# Patient Record
Sex: MALE | Race: WHITE | Employment: OTHER | URBAN - METROPOLITAN AREA
[De-identification: names, ages, dates, MRNs, and addresses within clinical notes are randomized per-mention and may not be internally consistent; named-entity substitution may affect disease eponyms.]

---

## 2018-08-24 LAB
CHOLESTEROL, TOTAL: 223 MG/DL
CHOLESTEROL/HDL RATIO: ABNORMAL
HDLC SERPL-MCNC: 74 MG/DL (ref 35–70)
LDL CHOLESTEROL CALCULATED: 134 MG/DL (ref 0–160)
TRIGL SERPL-MCNC: 76 MG/DL
VLDLC SERPL CALC-MCNC: 15 MG/DL

## 2019-08-01 DIAGNOSIS — Z98.890 S/P DISCECTOMY FOR HERNIATED NUCLEUS PULPOSUS: ICD-10-CM

## 2019-08-01 DIAGNOSIS — Z87.39 S/P DISCECTOMY FOR HERNIATED NUCLEUS PULPOSUS: ICD-10-CM

## 2019-08-01 DIAGNOSIS — Q45.3 PANCREATIC ABNORMALITY: ICD-10-CM

## 2019-08-14 ENCOUNTER — OFFICE VISIT (OUTPATIENT)
Dept: FAMILY MEDICINE CLINIC | Age: 69
End: 2019-08-14
Payer: COMMERCIAL

## 2019-08-14 VITALS
DIASTOLIC BLOOD PRESSURE: 92 MMHG | BODY MASS INDEX: 35.43 KG/M2 | WEIGHT: 233.8 LBS | HEIGHT: 68 IN | OXYGEN SATURATION: 98 % | SYSTOLIC BLOOD PRESSURE: 146 MMHG | HEART RATE: 80 BPM

## 2019-08-14 DIAGNOSIS — N40.0 BENIGN PROSTATIC HYPERPLASIA, UNSPECIFIED WHETHER LOWER URINARY TRACT SYMPTOMS PRESENT: ICD-10-CM

## 2019-08-14 DIAGNOSIS — Z00.00 WELLNESS EXAMINATION: ICD-10-CM

## 2019-08-14 DIAGNOSIS — M79.671 PAIN IN RIGHT FOOT: ICD-10-CM

## 2019-08-14 DIAGNOSIS — K21.9 GASTROESOPHAGEAL REFLUX DISEASE, ESOPHAGITIS PRESENCE NOT SPECIFIED: ICD-10-CM

## 2019-08-14 DIAGNOSIS — Z23 NEED FOR PROPHYLACTIC VACCINATION AND INOCULATION AGAINST VARICELLA: Primary | ICD-10-CM

## 2019-08-14 DIAGNOSIS — J30.9 ALLERGIC RHINITIS, UNSPECIFIED SEASONALITY, UNSPECIFIED TRIGGER: ICD-10-CM

## 2019-08-14 PROCEDURE — 99397 PER PM REEVAL EST PAT 65+ YR: CPT | Performed by: FAMILY MEDICINE

## 2019-08-14 RX ORDER — OMEPRAZOLE 20 MG/1
20 CAPSULE, DELAYED RELEASE ORAL DAILY
Qty: 90 CAPSULE | Refills: 1 | Status: SHIPPED | OUTPATIENT
Start: 2019-08-14 | End: 2019-09-30

## 2019-08-14 RX ORDER — MONTELUKAST SODIUM 10 MG/1
10 TABLET ORAL DAILY PRN
COMMUNITY
End: 2019-08-14 | Stop reason: SDUPTHER

## 2019-08-14 RX ORDER — MONTELUKAST SODIUM 10 MG/1
10 TABLET ORAL NIGHTLY
Qty: 90 TABLET | Refills: 1 | Status: SHIPPED | OUTPATIENT
Start: 2019-08-14 | End: 2020-04-02 | Stop reason: ALTCHOICE

## 2019-08-14 ASSESSMENT — ENCOUNTER SYMPTOMS
SHORTNESS OF BREATH: 0
VOMITING: 0
ABDOMINAL PAIN: 0
DIARRHEA: 0
COUGH: 0
NAUSEA: 0

## 2019-08-14 ASSESSMENT — PATIENT HEALTH QUESTIONNAIRE - PHQ9
2. FEELING DOWN, DEPRESSED OR HOPELESS: 0
SUM OF ALL RESPONSES TO PHQ9 QUESTIONS 1 & 2: 0
1. LITTLE INTEREST OR PLEASURE IN DOING THINGS: 0
SUM OF ALL RESPONSES TO PHQ QUESTIONS 1-9: 0
SUM OF ALL RESPONSES TO PHQ QUESTIONS 1-9: 0

## 2019-08-14 NOTE — PROGRESS NOTES
medications as prescribed and refills will be provided as necessary.  - omeprazole (PRILOSEC) 20 MG delayed release capsule; Take 1 capsule by mouth Daily  Dispense: 90 capsule; Refill: 1    4. Allergic rhinitis, unspecified seasonality, unspecified trigger  Continue taking medications as prescribed and refills will be provided as necessary.  - montelukast (SINGULAIR) 10 MG tablet; Take 1 tablet by mouth nightly  Dispense: 90 tablet; Refill: 1    5. Pain in right foot  Patient will have lab work completed in the next several days and medication dosages may be changed based on the lab results. Patient will be updated on lab results once they are completed and notified of any medication changes if necessary.  - Uric Acid; Future    6. Wellness examination  Patient will have lab work completed in the next several days and medication dosages may be changed based on the lab results. Patient will be updated on lab results once they are completed and notified of any medication changes if necessary.  - Comprehensive Metabolic Panel; Future  - CBC Auto Differential; Future  - Lipid Panel; Future    Patient will return early next week for his fasting lab work. Is otherwise to follow-up in 6 months. Is to call in approximately 7 to 10 days with his blood pressures as his BP was elevated here in the office, but if we can have a decreased blood pressure recorded at home we may not need to start antihypertensive treatment. Patient verbalized understanding of and agreement with current POC for the assessment and plan dictated above. Electronically signed by JERMAINE Callejas on 8/14/2019      Please note that this chart was generated using dragon dictation software. Although every effort was made to ensure the accuracy of this automated transcription, some errors in transcription may have occurred.

## 2019-08-15 ENCOUNTER — TELEPHONE (OUTPATIENT)
Dept: FAMILY MEDICINE CLINIC | Age: 69
End: 2019-08-15

## 2019-08-15 NOTE — TELEPHONE ENCOUNTER
----- Message from [facility] sent at 8/15/2019  8:25 AM EDT -----  Needs an rx for the shingles vaccine he was supposed to get it when he was here for his joshua but he never got it can we have one ready when he comes in on Monday to do his blood work

## 2019-08-19 ENCOUNTER — TELEPHONE (OUTPATIENT)
Dept: FAMILY MEDICINE CLINIC | Age: 69
End: 2019-08-19

## 2019-08-19 DIAGNOSIS — N40.0 BENIGN PROSTATIC HYPERPLASIA, UNSPECIFIED WHETHER LOWER URINARY TRACT SYMPTOMS PRESENT: ICD-10-CM

## 2019-08-19 DIAGNOSIS — M79.671 PAIN IN RIGHT FOOT: ICD-10-CM

## 2019-08-19 DIAGNOSIS — Z00.00 WELLNESS EXAMINATION: ICD-10-CM

## 2019-08-19 LAB
A/G RATIO: 2 (ref 1.1–2.2)
ALBUMIN SERPL-MCNC: 4.5 G/DL (ref 3.4–5)
ALP BLD-CCNC: 67 U/L (ref 40–129)
ALT SERPL-CCNC: 18 U/L (ref 10–40)
ANION GAP SERPL CALCULATED.3IONS-SCNC: 14 MMOL/L (ref 3–16)
AST SERPL-CCNC: 24 U/L (ref 15–37)
BASOPHILS ABSOLUTE: 0 K/UL (ref 0–0.2)
BASOPHILS RELATIVE PERCENT: 0.7 %
BILIRUB SERPL-MCNC: 0.5 MG/DL (ref 0–1)
BUN BLDV-MCNC: 16 MG/DL (ref 7–20)
CALCIUM SERPL-MCNC: 9.4 MG/DL (ref 8.3–10.6)
CHLORIDE BLD-SCNC: 103 MMOL/L (ref 99–110)
CHOLESTEROL, TOTAL: 195 MG/DL (ref 0–199)
CO2: 26 MMOL/L (ref 21–32)
CREAT SERPL-MCNC: 0.8 MG/DL (ref 0.8–1.3)
EOSINOPHILS ABSOLUTE: 0.1 K/UL (ref 0–0.6)
EOSINOPHILS RELATIVE PERCENT: 1.5 %
GFR AFRICAN AMERICAN: >60
GFR NON-AFRICAN AMERICAN: >60
GLOBULIN: 2.2 G/DL
GLUCOSE BLD-MCNC: 103 MG/DL (ref 70–99)
HCT VFR BLD CALC: 43.7 % (ref 40.5–52.5)
HDLC SERPL-MCNC: 70 MG/DL (ref 40–60)
HEMOGLOBIN: 14.8 G/DL (ref 13.5–17.5)
LDL CHOLESTEROL CALCULATED: 109 MG/DL
LYMPHOCYTES ABSOLUTE: 1.7 K/UL (ref 1–5.1)
LYMPHOCYTES RELATIVE PERCENT: 31.2 %
MCH RBC QN AUTO: 30.6 PG (ref 26–34)
MCHC RBC AUTO-ENTMCNC: 34 G/DL (ref 31–36)
MCV RBC AUTO: 90.1 FL (ref 80–100)
MONOCYTES ABSOLUTE: 0.6 K/UL (ref 0–1.3)
MONOCYTES RELATIVE PERCENT: 10.2 %
NEUTROPHILS ABSOLUTE: 3.1 K/UL (ref 1.7–7.7)
NEUTROPHILS RELATIVE PERCENT: 56.4 %
PDW BLD-RTO: 13.9 % (ref 12.4–15.4)
PLATELET # BLD: 205 K/UL (ref 135–450)
PMV BLD AUTO: 9.4 FL (ref 5–10.5)
POTASSIUM SERPL-SCNC: 4.7 MMOL/L (ref 3.5–5.1)
PROSTATE SPECIFIC ANTIGEN: 1.78 NG/ML (ref 0–4)
RBC # BLD: 4.85 M/UL (ref 4.2–5.9)
SODIUM BLD-SCNC: 143 MMOL/L (ref 136–145)
TOTAL PROTEIN: 6.7 G/DL (ref 6.4–8.2)
TRIGL SERPL-MCNC: 82 MG/DL (ref 0–150)
URIC ACID, SERUM: 5.1 MG/DL (ref 3.5–7.2)
VLDLC SERPL CALC-MCNC: 16 MG/DL
WBC # BLD: 5.5 K/UL (ref 4–11)

## 2019-08-19 RX ORDER — LOSARTAN POTASSIUM 50 MG/1
50 TABLET ORAL DAILY
Qty: 30 TABLET | Refills: 0 | Status: SHIPPED | OUTPATIENT
Start: 2019-08-19 | End: 2019-09-16 | Stop reason: SDUPTHER

## 2019-08-19 NOTE — TELEPHONE ENCOUNTER
SPOKE WITH PT AT 1001AM REGARD. MESSAGE BELOW PER CRUZ DEAN PT VOICED UNDERSTANDING. PT STATES HE USES [facility] FOR SHORT SCRIPTS AND INGENIO RX FOR HIS MAILORDER DID YOU WANT TO CALL IN A 90 DAY SUPPLY AS WELL? PT WANTED TO KNOW HOW LONG HE SHOULD TAKE THIS MEDICATION BEFORE F/U AGAIN. I WILL ROUTE THIS MESSAGE TO VICENTA TO REVIEW AND ADVISE. Electronically signed by Cory De Dios LPN on 9/90/1206 at 50:32 AM    ----- Message from Ismael Diop sent at 8/19/2019  9:03 AM EDT -----  Please let him know that we are starting losartan 50 mg, 1 tablet p.o. daily. Is to check his ambulatory blood pressures and call us in a week with those numbers. Please see what pharmacy he wants the sent to as we only have the mail order pharmacy listed. Also please remind him that should he develop any chest pain, shortness of breath, blurred vision, double vision, pain in either arm neck or jaw, or chest heaviness he is to seek care at the emergency department.

## 2019-08-19 NOTE — TELEPHONE ENCOUNTER
We will send a short prescription into [facility] with 1 refill. We will follow-up in approximately 4 weeks to see if we will keep him on that medication.

## 2019-08-20 ENCOUNTER — TELEPHONE (OUTPATIENT)
Dept: FAMILY MEDICINE CLINIC | Age: 69
End: 2019-08-20

## 2019-08-20 NOTE — TELEPHONE ENCOUNTER
SPOKE WITH PT AT 834AM REGARD. MESSAGE BELOW PER CRUZ DEAN PT VOICED UNDERSTANDING. Electronically signed by Shawn Armenta LPN on 7/95/5849 at 2:85 AM    ----- Message from Jerri Miguelma sent at 8/20/2019  8:27 AM EDT -----  Please let him know that his blood counts, and his PSA level were all normal.  His cholesterol looked okay with his bad cholesterol just slightly elevated. Continue to follow a low-fat, low-cholesterol diet. His fasting blood sugar was slightly elevated at 103, and we like to see that below 100. Continue to monitor diet, decreasing sugar and carbohydrate intake.     Thanks, Steph

## 2019-09-13 ENCOUNTER — OFFICE VISIT (OUTPATIENT)
Dept: FAMILY MEDICINE CLINIC | Age: 69
End: 2019-09-13
Payer: COMMERCIAL

## 2019-09-13 VITALS
HEIGHT: 68 IN | OXYGEN SATURATION: 99 % | TEMPERATURE: 98.4 F | SYSTOLIC BLOOD PRESSURE: 138 MMHG | WEIGHT: 237.8 LBS | DIASTOLIC BLOOD PRESSURE: 88 MMHG | HEART RATE: 88 BPM | BODY MASS INDEX: 36.04 KG/M2

## 2019-09-13 DIAGNOSIS — B96.89 ACUTE BACTERIAL SINUSITIS: Primary | ICD-10-CM

## 2019-09-13 DIAGNOSIS — I10 ESSENTIAL HYPERTENSION: Chronic | ICD-10-CM

## 2019-09-13 DIAGNOSIS — J01.90 ACUTE BACTERIAL SINUSITIS: Primary | ICD-10-CM

## 2019-09-13 PROCEDURE — 99213 OFFICE O/P EST LOW 20 MIN: CPT | Performed by: FAMILY MEDICINE

## 2019-09-13 RX ORDER — AMOXICILLIN 875 MG/1
875 TABLET, COATED ORAL 2 TIMES DAILY
Qty: 20 TABLET | Refills: 0 | Status: SHIPPED | OUTPATIENT
Start: 2019-09-13 | End: 2019-09-23

## 2019-09-13 NOTE — PROGRESS NOTES
Subjective:      Patient ID: Michelle Ferris is a 81 y.o. male.     HPI  She has been having a cough nasal congestion with purulent nasal drainage some sinus pressure no fever  Symptoms and present for 5 days  He has been exposed both any known contagious diseases  He does not have any wheezing  He does not smoke  Is on medication for hypertension and that reason been recently well controlled recently  He is on Singulair regularly for allergic rhinitis and Allegra and Allegra as well  Review of Systems    Objective:   Physical Exam  Eye exam shows no evidence of conjunctivitis  TMs are normal  Nose is clear  Throat shows no exudate  No neck masses or nodes  Lungs show no crackles or wheezes  Cardiac exam is normal  Assessment:      Sinusitis  Hypertension adequately controlled      Plan:      Facility 875 mg twice daily for 10 days  Continue same blood pressure medicine  Follow-up in 6 months        Hannah Gary MD

## 2019-09-16 ENCOUNTER — TELEPHONE (OUTPATIENT)
Dept: FAMILY MEDICINE CLINIC | Age: 69
End: 2019-09-16

## 2019-09-16 RX ORDER — LOSARTAN POTASSIUM 50 MG/1
50 TABLET ORAL DAILY
Qty: 30 TABLET | Refills: 5 | Status: SHIPPED | OUTPATIENT
Start: 2019-09-16 | End: 2020-03-13 | Stop reason: SDUPTHER

## 2019-09-19 ENCOUNTER — TELEPHONE (OUTPATIENT)
Dept: FAMILY MEDICINE CLINIC | Age: 69
End: 2019-09-19

## 2019-09-19 RX ORDER — LEVOFLOXACIN 500 MG/1
500 TABLET, FILM COATED ORAL DAILY
Qty: 10 TABLET | Refills: 0 | Status: SHIPPED | OUTPATIENT
Start: 2019-09-19 | End: 2019-09-29

## 2019-09-20 ENCOUNTER — TELEPHONE (OUTPATIENT)
Dept: FAMILY MEDICINE CLINIC | Age: 69
End: 2019-09-20

## 2019-09-20 NOTE — TELEPHONE ENCOUNTER
[address]/ PT ADVISED NOTE UP FRONT  Electronically signed by Ynes Ang MA on 9/20/2019 at 3:42 PM

## 2019-09-30 ENCOUNTER — HOSPITAL ENCOUNTER (OUTPATIENT)
Age: 69
Discharge: HOME | End: 2019-09-30
Payer: COMMERCIAL

## 2019-09-30 ENCOUNTER — HOSPITAL ENCOUNTER (OUTPATIENT)
Dept: GENERAL RADIOLOGY | Age: 69
Discharge: HOME | End: 2019-09-30
Payer: COMMERCIAL

## 2019-09-30 ENCOUNTER — OFFICE VISIT (OUTPATIENT)
Dept: FAMILY MEDICINE CLINIC | Age: 69
End: 2019-09-30
Payer: COMMERCIAL

## 2019-09-30 VITALS
SYSTOLIC BLOOD PRESSURE: 138 MMHG | WEIGHT: 242.8 LBS | BODY MASS INDEX: 36.8 KG/M2 | HEART RATE: 82 BPM | OXYGEN SATURATION: 98 % | HEIGHT: 68 IN | DIASTOLIC BLOOD PRESSURE: 84 MMHG

## 2019-09-30 DIAGNOSIS — J20.9 ACUTE BRONCHITIS TREATED WITH ANTIBIOTICS IN THE PAST 60 DAYS: ICD-10-CM

## 2019-09-30 DIAGNOSIS — R07.81 RIB PAIN ON RIGHT SIDE: ICD-10-CM

## 2019-09-30 DIAGNOSIS — K21.9 GASTROESOPHAGEAL REFLUX DISEASE, ESOPHAGITIS PRESENCE NOT SPECIFIED: ICD-10-CM

## 2019-09-30 DIAGNOSIS — J01.90 ACUTE SINUSITIS TREATED WITH ANTIBIOTICS IN THE PAST 60 DAYS: ICD-10-CM

## 2019-09-30 DIAGNOSIS — J20.9 ACUTE BRONCHITIS TREATED WITH ANTIBIOTICS IN THE PAST 60 DAYS: Primary | ICD-10-CM

## 2019-09-30 PROCEDURE — 99213 OFFICE O/P EST LOW 20 MIN: CPT | Performed by: PHYSICIAN ASSISTANT

## 2019-09-30 PROCEDURE — 71046 X-RAY EXAM CHEST 2 VIEWS: CPT

## 2019-09-30 RX ORDER — DOXYCYCLINE HYCLATE 100 MG
100 TABLET ORAL 2 TIMES DAILY
Qty: 20 TABLET | Refills: 0 | Status: SHIPPED | OUTPATIENT
Start: 2019-09-30 | End: 2019-10-10

## 2019-09-30 RX ORDER — OMEPRAZOLE 40 MG/1
40 CAPSULE, DELAYED RELEASE ORAL DAILY
Qty: 90 CAPSULE | Refills: 1 | Status: SHIPPED | OUTPATIENT
Start: 2019-09-30 | End: 2020-03-13 | Stop reason: SDUPTHER

## 2019-09-30 RX ORDER — PREDNISONE 20 MG/1
TABLET ORAL
Qty: 20 TABLET | Refills: 0 | Status: SHIPPED | OUTPATIENT
Start: 2019-09-30 | End: 2019-10-12

## 2019-09-30 NOTE — PROGRESS NOTES
sinus congestion and drainage  Cardiovascular:  Denies chest pain, palpitations or swelling.  Admits to right sided rib pain  Respiratory:  Admits to cough, occasional dyspnea  GI:  Denies abdominal pain, nausea, vomiting, or diarrhea  Musculoskeletal:  Denies back pain  Skin:  No rashes  Neurologic:  Denies headache, focal weakness, or sensory changes  Lymphatic:  Denies swollen glands      PAST MEDICAL HISTORY  Past Medical History:   Diagnosis Date    Basal cell carcinoma of skin     RIGHT ANTERIOR SHOULDER    Benign prostatic hyperplasia     Chronic sinusitis     GERD (gastroesophageal reflux disease)     H/O seasonal allergies     Low back pain     Pancreatic abnormality     INCREASED PANCREATIC LIPASE    S/P discectomy for herniated nucleus pulposus     C6-C7       FAMILY HISTORY  Family History   Problem Relation Age of Onset    Cancer Other         FEMALE FAMILY MEMBER - UTERINE       SOCIAL HISTORY  Social History     Socioeconomic History    Marital status:      Spouse name: Not on file    Number of children: Not on file    Years of education: Not on file    Highest education level: Not on file   Occupational History    Not on file   Social Needs    Financial resource strain: Not on file    Food insecurity:     Worry: Not on file     Inability: Not on file    Transportation needs:     Medical: Not on file     Non-medical: Not on file   Tobacco Use    Smoking status: Former Smoker     Packs/day: 1.00     Years: 13.00     Pack years: 13.00     Types: Cigarettes     Start date: 1972     Last attempt to quit: 1985     Years since quittin.1    Smokeless tobacco: Never Used   Substance and Sexual Activity    Alcohol use: No     Comment: two mixed drinks 5 days a week    Drug use: No    Sexual activity: Yes     Partners: Female   Lifestyle    Physical activity:     Days per week: Not on file     Minutes per session: Not on file    Stress: Not on file   Relationships  Social connections:     Talks on phone: Not on file     Gets together: Not on file     Attends Adventist service: Not on file     Active member of club or organization: Not on file     Attends meetings of clubs or organizations: Not on file     Relationship status: Not on file    Intimate partner violence:     Fear of current or ex partner: Not on file     Emotionally abused: Not on file     Physically abused: Not on file     Forced sexual activity: Not on file   Other Topics Concern    Not on file   Social History Narrative    Not on file        SURGICAL HISTORY  Past Surgical History:   Procedure Laterality Date    APPENDECTOMY      COLONOSCOPY  4/10/15    diverticulosis, colon polyps, internal hemorrhoids    KNEE SURGERY Bilateral     Scopes of both knees.  TONGUE SURGERY      Exploratory of lump under tongue. Normal tissue.  VASECTOMY  05/11/1995       CURRENT MEDICATIONS  Current Outpatient Medications   Medication Sig Dispense Refill    predniSONE (DELTASONE) 20 MG tablet Take 3 tablets by mouth daily for 3 days, THEN 2 tablets daily for 3 days, THEN 1 tablet daily for 3 days, THEN 0.5 tablets daily for 3 days. 20 tablet 0    doxycycline hyclate (VIBRA-TABS) 100 MG tablet Take 1 tablet by mouth 2 times daily for 10 days 20 tablet 0    losartan (COZAAR) 50 MG tablet Take 1 tablet by mouth daily 30 tablet 5    Sodium Chloride-Xylitol (XLEAR SINUS CARE SPRAY NA) by Nasal route 2 times daily as needed      omeprazole (PRILOSEC) 20 MG delayed release capsule Take 1 capsule by mouth Daily (Patient taking differently: Take 40 mg by mouth Daily ) 90 capsule 1    montelukast (SINGULAIR) 10 MG tablet Take 1 tablet by mouth nightly 90 tablet 1    fexofenadine (ALLEGRA) 180 MG tablet Take 180 mg by mouth as needed. No current facility-administered medications for this visit.         ALLERGIES  Allergies   Allergen Reactions    Erythromycin     Other      Chloraseptic- sores on tongue   

## 2019-10-01 ENCOUNTER — TELEPHONE (OUTPATIENT)
Dept: FAMILY MEDICINE CLINIC | Age: 69
End: 2019-10-01

## 2020-03-16 RX ORDER — OMEPRAZOLE 40 MG/1
40 CAPSULE, DELAYED RELEASE ORAL DAILY
Qty: 90 CAPSULE | Refills: 0 | Status: SHIPPED | OUTPATIENT
Start: 2020-03-16 | End: 2020-04-02 | Stop reason: SDUPTHER

## 2020-03-16 RX ORDER — LOSARTAN POTASSIUM 50 MG/1
50 TABLET ORAL DAILY
Qty: 90 TABLET | Refills: 0 | Status: SHIPPED | OUTPATIENT
Start: 2020-03-16 | End: 2020-04-02 | Stop reason: SDUPTHER

## 2020-04-02 ENCOUNTER — TELEMEDICINE (OUTPATIENT)
Dept: FAMILY MEDICINE CLINIC | Age: 70
End: 2020-04-02
Payer: MEDICARE

## 2020-04-02 PROCEDURE — 4040F PNEUMOC VAC/ADMIN/RCVD: CPT | Performed by: FAMILY MEDICINE

## 2020-04-02 PROCEDURE — 99213 OFFICE O/P EST LOW 20 MIN: CPT | Performed by: FAMILY MEDICINE

## 2020-04-02 PROCEDURE — G8428 CUR MEDS NOT DOCUMENT: HCPCS | Performed by: FAMILY MEDICINE

## 2020-04-02 PROCEDURE — 1123F ACP DISCUSS/DSCN MKR DOCD: CPT | Performed by: FAMILY MEDICINE

## 2020-04-02 PROCEDURE — 3017F COLORECTAL CA SCREEN DOC REV: CPT | Performed by: FAMILY MEDICINE

## 2020-04-02 RX ORDER — LOSARTAN POTASSIUM 50 MG/1
50 TABLET ORAL DAILY
Qty: 90 TABLET | Refills: 0 | Status: SHIPPED | OUTPATIENT
Start: 2020-04-02 | End: 2020-06-12 | Stop reason: SDUPTHER

## 2020-04-02 RX ORDER — OMEPRAZOLE 40 MG/1
40 CAPSULE, DELAYED RELEASE ORAL DAILY
Qty: 90 CAPSULE | Refills: 0 | Status: SHIPPED | OUTPATIENT
Start: 2020-04-02 | End: 2020-06-12 | Stop reason: SDUPTHER

## 2020-04-02 ASSESSMENT — ENCOUNTER SYMPTOMS: RESPIRATORY NEGATIVE: 1

## 2020-04-02 NOTE — PROGRESS NOTES
[facility], [address]2 waiver authority and the [employer] and [employer] Act, this Virtual Visit was conducted with patient's (and/or legal guardian's) consent, to reduce the patient's risk of exposure to COVID-19 and provide necessary medical care. The patient (and/or legal guardian) has also been advised to contact this office for worsening conditions or problems, and seek emergency medical treatment and/or call 911 if deemed necessary. Services were provided through a video synchronous discussion virtually to substitute for in-person clinic visit. Patient and provider were located at their individual homes. --Tristan Gilman MD on 4/2/2020 at 2:39 PM    An electronic signature was used to authenticate this note.

## 2020-06-12 RX ORDER — OMEPRAZOLE 40 MG/1
40 CAPSULE, DELAYED RELEASE ORAL DAILY
Qty: 90 CAPSULE | Refills: 1 | Status: SHIPPED | OUTPATIENT
Start: 2020-06-12 | End: 2020-12-10 | Stop reason: SDUPTHER

## 2020-06-12 RX ORDER — LOSARTAN POTASSIUM 50 MG/1
50 TABLET ORAL DAILY
Qty: 90 TABLET | Refills: 1 | Status: SHIPPED | OUTPATIENT
Start: 2020-06-12 | End: 2020-12-03 | Stop reason: SDUPTHER

## 2020-08-04 ENCOUNTER — TELEPHONE (OUTPATIENT)
Dept: FAMILY MEDICINE CLINIC | Age: 70
End: 2020-08-04

## 2020-09-11 ENCOUNTER — OFFICE VISIT (OUTPATIENT)
Dept: FAMILY MEDICINE CLINIC | Age: 70
End: 2020-09-11
Payer: MEDICARE

## 2020-09-11 VITALS
SYSTOLIC BLOOD PRESSURE: 120 MMHG | HEIGHT: 68 IN | HEART RATE: 90 BPM | DIASTOLIC BLOOD PRESSURE: 86 MMHG | WEIGHT: 239.8 LBS | TEMPERATURE: 97.1 F | BODY MASS INDEX: 36.34 KG/M2 | OXYGEN SATURATION: 98 %

## 2020-09-11 PROBLEM — E66.01 MORBIDLY OBESE (HCC): Status: ACTIVE | Noted: 2020-09-11

## 2020-09-11 PROCEDURE — 4040F PNEUMOC VAC/ADMIN/RCVD: CPT | Performed by: FAMILY MEDICINE

## 2020-09-11 PROCEDURE — 1036F TOBACCO NON-USER: CPT | Performed by: FAMILY MEDICINE

## 2020-09-11 PROCEDURE — 3017F COLORECTAL CA SCREEN DOC REV: CPT | Performed by: FAMILY MEDICINE

## 2020-09-11 PROCEDURE — 99214 OFFICE O/P EST MOD 30 MIN: CPT | Performed by: FAMILY MEDICINE

## 2020-09-11 PROCEDURE — 1123F ACP DISCUSS/DSCN MKR DOCD: CPT | Performed by: FAMILY MEDICINE

## 2020-09-11 PROCEDURE — G8417 CALC BMI ABV UP PARAM F/U: HCPCS | Performed by: FAMILY MEDICINE

## 2020-09-11 PROCEDURE — G0008 ADMIN INFLUENZA VIRUS VAC: HCPCS | Performed by: FAMILY MEDICINE

## 2020-09-11 PROCEDURE — G8427 DOCREV CUR MEDS BY ELIG CLIN: HCPCS | Performed by: FAMILY MEDICINE

## 2020-09-11 PROCEDURE — 90694 VACC AIIV4 NO PRSRV 0.5ML IM: CPT | Performed by: FAMILY MEDICINE

## 2020-09-11 RX ORDER — NAPROXEN 250 MG/1
500 TABLET ORAL 2 TIMES DAILY PRN
COMMUNITY
End: 2021-03-11 | Stop reason: DRUGHIGH

## 2020-09-11 ASSESSMENT — PATIENT HEALTH QUESTIONNAIRE - PHQ9
SUM OF ALL RESPONSES TO PHQ9 QUESTIONS 1 & 2: 0
SUM OF ALL RESPONSES TO PHQ QUESTIONS 1-9: 0
1. LITTLE INTEREST OR PLEASURE IN DOING THINGS: 0
SUM OF ALL RESPONSES TO PHQ QUESTIONS 1-9: 0
2. FEELING DOWN, DEPRESSED OR HOPELESS: 0

## 2020-09-11 ASSESSMENT — ENCOUNTER SYMPTOMS
DIARRHEA: 0
SORE THROAT: 0
SHORTNESS OF BREATH: 0
COUGH: 0
NAUSEA: 0
CONSTIPATION: 0
BACK PAIN: 1
BLOOD IN STOOL: 0
ABDOMINAL PAIN: 1

## 2020-09-11 NOTE — PROGRESS NOTES
little bit of radiation into his mid back   Allergic/Immunologic: Positive for environmental allergies. Psychiatric/Behavioral: Negative.         PAST MEDICAL HISTORY  Past Medical History:   Diagnosis Date    Basal cell carcinoma of skin     RIGHT ANTERIOR SHOULDER    Benign prostatic hyperplasia     Chronic sinusitis     GERD (gastroesophageal reflux disease)     H/O seasonal allergies     Low back pain     Pancreatic abnormality     INCREASED PANCREATIC LIPASE    S/P discectomy for herniated nucleus pulposus     C6-C7       FAMILY HISTORY  Family History   Problem Relation Age of Onset    Cancer Other         FEMALE FAMILY MEMBER - UTERINE       SOCIAL HISTORY  Social History     Socioeconomic History    Marital status:      Spouse name: None    Number of children: None    Years of education: None    Highest education level: None   Occupational History    None   Social Needs    Financial resource strain: None    Food insecurity     Worry: None     Inability: None    Transportation needs     Medical: None     Non-medical: None   Tobacco Use    Smoking status: Former Smoker     Packs/day: 1.00     Years: 13.00     Pack years: 13.00     Types: Cigarettes     Start date: 1972     Last attempt to quit: 1985     Years since quittin.1    Smokeless tobacco: Never Used   Substance and Sexual Activity    Alcohol use: No     Comment: two mixed drinks 5 days a week    Drug use: No    Sexual activity: Yes     Partners: Female   Lifestyle    Physical activity     Days per week: None     Minutes per session: None    Stress: None   Relationships    Social connections     Talks on phone: None     Gets together: None     Attends Mormonism service: None     Active member of club or organization: None     Attends meetings of clubs or organizations: None     Relationship status: None    Intimate partner violence     Fear of current or ex partner: None     Emotionally abused: None Right Ear: External ear normal.      Left Ear: External ear normal.   Eyes:      Conjunctiva/sclera: Conjunctivae normal.   Cardiovascular:      Rate and Rhythm: Normal rate. Pulmonary:      Effort: Pulmonary effort is normal. No respiratory distress. Abdominal:      General: There is no distension. Palpations: There is no mass. Tenderness: There is no abdominal tenderness. There is no right CVA tenderness, guarding or rebound. Hernia: No hernia is present. Skin:     General: Skin is warm and dry. Neurological:      General: No focal deficit present. Mental Status: He is alert. Mental status is at baseline. Psychiatric:         Mood and Affect: Mood normal.         Thought Content: Thought content normal.       Immunizations reviewed  ASSESSMENT and Eden Mendoza was seen today for rib pain. Diagnoses and all orders for this visit:    Essential hypertension  -     Comprehensive Metabolic Panel; Future  -     Lipid Panel; Future    Morbidly obese (HCC)    Right upper quadrant abdominal pain  -     US ABDOMINAL LIMITED; Future    Gastroesophageal reflux disease, esophagitis presence not specified    Other orders  -     Influenza, Quadv, Adjunanted,, 65 yrs+ , IM, PF, Prefill syr, 0.5mL (FLUAD)    Will work-up the right upper quadrant pain without ultrasound and above reference labs  Continue same antihypertensive  Influenza vaccine administered  Follow-up by phone regarding his test results      Return in about 6 months (around 3/11/2021). Electronically signed by Shelly Price MD on 9/11/2020    Please note that this chart was generated using dragon dictation software. Although every effort was made to ensure the accuracy of this automated transcription, some errors in transcription may have occurred.

## 2020-09-17 ENCOUNTER — HOSPITAL ENCOUNTER (OUTPATIENT)
Age: 70
Discharge: HOME | End: 2020-09-17
Payer: MEDICARE

## 2020-09-17 ENCOUNTER — HOSPITAL ENCOUNTER (OUTPATIENT)
Dept: ULTRASOUND IMAGING | Age: 70
Discharge: HOME | End: 2020-09-17
Payer: MEDICARE

## 2020-09-17 LAB
ALBUMIN SERPL-MCNC: 4.4 GM/DL (ref 3.4–5)
ALP BLD-CCNC: 70 IU/L (ref 40–128)
ALT SERPL-CCNC: 21 U/L (ref 10–40)
ANION GAP SERPL CALCULATED.3IONS-SCNC: 7 MMOL/L (ref 4–16)
AST SERPL-CCNC: 18 IU/L (ref 15–37)
BILIRUB SERPL-MCNC: 0.3 MG/DL (ref 0–1)
BUN BLDV-MCNC: 19 MG/DL (ref 6–23)
CALCIUM SERPL-MCNC: 8.9 MG/DL (ref 8.3–10.6)
CHLORIDE BLD-SCNC: 102 MMOL/L (ref 99–110)
CHOLESTEROL: 200 MG/DL
CO2: 29 MMOL/L (ref 21–32)
CREAT SERPL-MCNC: 1 MG/DL (ref 0.9–1.3)
GFR AFRICAN AMERICAN: >60 ML/MIN/1.73M2
GFR NON-AFRICAN AMERICAN: >60 ML/MIN/1.73M2
GLUCOSE BLD-MCNC: 112 MG/DL (ref 70–99)
HDLC SERPL-MCNC: 73 MG/DL
LDL CHOLESTEROL DIRECT: 115 MG/DL
POTASSIUM SERPL-SCNC: 5 MMOL/L (ref 3.5–5.1)
SODIUM BLD-SCNC: 138 MMOL/L (ref 135–145)
TOTAL PROTEIN: 6.3 GM/DL (ref 6.4–8.2)
TRIGL SERPL-MCNC: 60 MG/DL

## 2020-09-17 PROCEDURE — 76705 ECHO EXAM OF ABDOMEN: CPT

## 2020-09-17 PROCEDURE — 83721 ASSAY OF BLOOD LIPOPROTEIN: CPT

## 2020-09-17 PROCEDURE — 80061 LIPID PANEL: CPT

## 2020-09-17 PROCEDURE — 36415 COLL VENOUS BLD VENIPUNCTURE: CPT

## 2020-09-17 PROCEDURE — 80053 COMPREHEN METABOLIC PANEL: CPT

## 2020-09-18 NOTE — PROGRESS NOTES
Vaccine Information Sheet, \"Influenza - Inactivated\"  given to Chuyita Huitron, or parent/legal guardian of  Chuyita Huitron and verbalized understanding. Patient responses:    Have you ever had a reaction to a flu vaccine? No  Do you have any current illness? No  Have you ever had Guillian [place] Syndrome? No  Do you have a serious allergy to any of the follow: Neomycin, Polymyxin, Thimerosal, eggs or egg products? No    Flu vaccine given per order. Please see immunization tab. Risks and benefits explained. Current VIS given.       Immunizations Administered     Name Date Dose Route    Influenza, Quadv, adjuvanted, 65 yrs +, IM, PF (Fluad) 9/11/2020 0.5 mL Intramuscular    Site: Deltoid- Left    Lot: [id number]    NDC: 03613-809-36

## 2020-09-21 ENCOUNTER — TELEPHONE (OUTPATIENT)
Dept: FAMILY MEDICINE CLINIC | Age: 70
End: 2020-09-21

## 2020-12-03 RX ORDER — LOSARTAN POTASSIUM 50 MG/1
50 TABLET ORAL DAILY
Qty: 90 TABLET | Refills: 1 | Status: SHIPPED | OUTPATIENT
Start: 2020-12-03 | End: 2021-05-24 | Stop reason: SDUPTHER

## 2020-12-10 ENCOUNTER — TELEMEDICINE (OUTPATIENT)
Dept: FAMILY MEDICINE CLINIC | Age: 70
End: 2020-12-10
Payer: MEDICARE

## 2020-12-10 PROCEDURE — 4040F PNEUMOC VAC/ADMIN/RCVD: CPT | Performed by: PHYSICIAN ASSISTANT

## 2020-12-10 PROCEDURE — 1123F ACP DISCUSS/DSCN MKR DOCD: CPT | Performed by: PHYSICIAN ASSISTANT

## 2020-12-10 PROCEDURE — G8484 FLU IMMUNIZE NO ADMIN: HCPCS | Performed by: PHYSICIAN ASSISTANT

## 2020-12-10 PROCEDURE — G8427 DOCREV CUR MEDS BY ELIG CLIN: HCPCS | Performed by: PHYSICIAN ASSISTANT

## 2020-12-10 PROCEDURE — 99214 OFFICE O/P EST MOD 30 MIN: CPT | Performed by: PHYSICIAN ASSISTANT

## 2020-12-10 PROCEDURE — G8417 CALC BMI ABV UP PARAM F/U: HCPCS | Performed by: PHYSICIAN ASSISTANT

## 2020-12-10 PROCEDURE — 1036F TOBACCO NON-USER: CPT | Performed by: PHYSICIAN ASSISTANT

## 2020-12-10 PROCEDURE — 3017F COLORECTAL CA SCREEN DOC REV: CPT | Performed by: PHYSICIAN ASSISTANT

## 2020-12-10 RX ORDER — OMEPRAZOLE 40 MG/1
40 CAPSULE, DELAYED RELEASE ORAL DAILY
Qty: 90 CAPSULE | Refills: 1 | Status: SHIPPED | OUTPATIENT
Start: 2020-12-10 | End: 2021-05-24 | Stop reason: SDUPTHER

## 2020-12-10 RX ORDER — MULTIVIT WITH MINERALS/LUTEIN
250 TABLET ORAL DAILY
COMMUNITY

## 2020-12-10 ASSESSMENT — ENCOUNTER SYMPTOMS
SHORTNESS OF BREATH: 0
BLOOD IN STOOL: 0
CONSTIPATION: 0
DIARRHEA: 0
COUGH: 0

## 2020-12-10 NOTE — PROGRESS NOTES
12/10/2020    TELEHEALTH EVALUATION -- Audio/Visual ([facility])    HPI:    Jorge Granados (:  1950) has requested an audio/video evaluation for the following concern(s):    HTN - Compliant with Losartan 50 mg daily. Home bp averages around 120/65. GERD - Well controlled on Omeprazole 40 mg daily. Allergies - Well controlled on Allegra daily. RUQ Pain - Describes this as an \"ache,\" which is \"always there,\" but worse after heavy lifting. Denies bulges. Health maintenance - Shingles vaccine, P23     The 10-year ASCVD risk score (Niurka Gonzalez, et al., 2013) is: 13.7%    Values used to calculate the score:      Age: 78 years      Sex: Male      Is Non- : No      Diabetic: No      Tobacco smoker: No      Systolic Blood Pressure: 910 mmHg      Is BP treated: Yes      HDL Cholesterol: 73 MG/DL      Total Cholesterol: 200 MG/DL    Review of Systems   Respiratory: Negative for cough and shortness of breath. Cardiovascular: Negative for chest pain and palpitations. Gastrointestinal: Negative for blood in stool, constipation and diarrhea. Prior to Visit Medications    Medication Sig Taking? Authorizing Provider   Ascorbic Acid (VITAMIN C) 250 MG tablet Take 250 mg by mouth daily Yes Historical Provider, MD   omeprazole (PRILOSEC) 40 MG delayed release capsule Take 1 capsule by mouth daily Yes Riki Garcias PA-C   losartan (COZAAR) 50 MG tablet Take 1 tablet by mouth daily Yes Waqas Steiner MD   Sodium Chloride-Xylitol (XLEAR SINUS CARE SPRAY NA) by Nasal route 2 times daily as needed Yes Historical Provider, MD   fexofenadine (ALLEGRA) 180 MG tablet Take 180 mg by mouth as needed.  Yes Historical Provider, MD   Calcium Carb-Cholecalciferol (CALCIUM-VITAMIN D) 500-200 MG-UNIT per tablet Take 1 tablet by mouth 2 times daily (with meals)  Historical Provider, MD   Multiple Vitamins-Minerals (HAIR SKIN AND NAILS FORMULA PO) Take 1 tablet by mouth daily  Historical Provider, MD   naproxen (NAPROSYN) 250 MG tablet Take 500 mg by mouth 2 times daily as needed for Pain  Historical Provider, MD   GLUCOSAMINE-CHONDROITIN DS PO Take 1 tablet by mouth daily  Historical Provider, MD       Social History     Tobacco Use    Smoking status: Former Smoker     Packs/day: 1.00     Years: 13.00     Pack years: 13.00     Types: Cigarettes     Start date: 1972     Last attempt to quit: 1985     Years since quittin.3    Smokeless tobacco: Never Used   Substance Use Topics    Alcohol use: No     Comment: two mixed drinks 5 days a week    Drug use: No        PHYSICAL EXAMINATION:    Constitutional: Appears well-developed and well-nourished. No apparent distress    Mental status: Alert and awake, Oriented to person/place/time, Able to follow commands    Eyes: EOM normal, sclera normal, no visible discharge. HENT: Normocephalic, atraumatic. Mouth/Throat: Mucous membranes are moist. External Ears Normal   Neck: No visualized mass   Pulmonary/Chest: Respiratory effort normal. No visualized signs of difficulty breathing or respiratory distress   Musculoskeletal: Normal gait with no signs of ataxia. Normal range of motion of neck  Neurological:No Facial Asymmetry (Cranial nerve 7 motor function) (limited exam to video visit). No gaze palsy. Skin: No significant exanthematous lesions or discoloration noted on facial skin  Psychiatric: Normal Affect. No Hallucinations. ASSESSMENT/PLAN:  1. Gastroesophageal reflux disease  Well-controlled. Refilled medication. - omeprazole (PRILOSEC) 40 MG delayed release capsule; Take 1 capsule by mouth daily  Dispense: 90 capsule; Refill: 1    2. Essential hypertension  Well-controlled. - CBC Auto Differential; Future  - Comprehensive Metabolic Panel; Future    3. Right upper quadrant abdominal pain  RUQ US normal. Discomfort is likely muscular. Recommended Naproxen 500 mg BID X 10-14 days then prn.  Also recommended trying heating pad. Offered to prescribe muscle relaxer for the patient to try prn, but patient politely declined this. 4. IFG (impaired fasting glucose)  Last fasting glucose was elevated. Will check A1c and advise based on findings. - Hemoglobin A1C; Future    5. 10 year risk of MI or stroke 7.5% or greater  I explained to the patient that his ASCVD risk score is greater than 7.5%, so we will recheck his lipid panel, and I will rerun the calculation. If it is still greater than 7.5%, we will start a statin medication. Patient notes that he has been on a statin medication before, and he had muscle and joint aches on this. He cannot remember the name of this  - Lipid Panel; Future    6. Mixed hyperlipidemia  See above (#5). - Lipid Panel; Future    7. Need for hepatitis C screening test  Health maintenance requirement.  - Hepatitis C Antibody; Future      Return in about 6 months (around 6/10/2021) for Routine 6 month follow up. Kami Hart is a 78 y.o. male being evaluated by a Virtual Visit (video visit) encounter to address concerns as mentioned above. A caregiver was present when appropriate. Due to this being a TeleHealth encounter (During [facility]-54 public health emergency), evaluation of the following organ systems was limited: Vitals/Constitutional/EENT/Resp/CV/GI//MS/Neuro/Skin/Heme-Lymph-Imm. Pursuant to the emergency declaration under the [facility], [address] [place] [address] and the [employer] and [employer] Act, this Virtual Visit was conducted with patient's (and/or legal guardian's) consent, to reduce the patient's risk of exposure to COVID-19 and provide necessary medical care. The patient (and/or legal guardian) has also been advised to contact this office for worsening conditions or problems, and seek emergency medical treatment and/or call 911 if deemed necessary.      Patient identification was verified at the start of the visit: Yes    Total time spent on this encounter: 10 minutes    Services were provided through a video synchronous discussion virtually to substitute for in-person clinic visit. Patient and provider were located at their individual homes. --Janneth Solano PA-C on 12/10/2020 at 2:50 PM    An electronic signature was used to authenticate this note.

## 2020-12-11 DIAGNOSIS — E78.2 MIXED HYPERLIPIDEMIA: ICD-10-CM

## 2020-12-11 DIAGNOSIS — Z11.59 NEED FOR HEPATITIS C SCREENING TEST: ICD-10-CM

## 2020-12-11 DIAGNOSIS — I10 ESSENTIAL HYPERTENSION: ICD-10-CM

## 2020-12-11 DIAGNOSIS — R73.01 IFG (IMPAIRED FASTING GLUCOSE): ICD-10-CM

## 2020-12-11 DIAGNOSIS — Z91.89 10 YEAR RISK OF MI OR STROKE 7.5% OR GREATER: ICD-10-CM

## 2020-12-11 LAB
A/G RATIO: 2 (ref 1.1–2.2)
ALBUMIN SERPL-MCNC: 4.4 G/DL (ref 3.4–5)
ALP BLD-CCNC: 75 U/L (ref 40–129)
ALT SERPL-CCNC: 18 U/L (ref 10–40)
ANION GAP SERPL CALCULATED.3IONS-SCNC: 13 MMOL/L (ref 3–16)
AST SERPL-CCNC: 21 U/L (ref 15–37)
BASOPHILS ABSOLUTE: 0 K/UL (ref 0–0.2)
BASOPHILS RELATIVE PERCENT: 0.6 %
BILIRUB SERPL-MCNC: 0.3 MG/DL (ref 0–1)
BUN BLDV-MCNC: 19 MG/DL (ref 7–20)
CALCIUM SERPL-MCNC: 8.9 MG/DL (ref 8.3–10.6)
CHLORIDE BLD-SCNC: 104 MMOL/L (ref 99–110)
CHOLESTEROL, TOTAL: 216 MG/DL (ref 0–199)
CO2: 25 MMOL/L (ref 21–32)
CREAT SERPL-MCNC: 0.8 MG/DL (ref 0.8–1.3)
EOSINOPHILS ABSOLUTE: 0.1 K/UL (ref 0–0.6)
EOSINOPHILS RELATIVE PERCENT: 1.8 %
GFR AFRICAN AMERICAN: >60
GFR NON-AFRICAN AMERICAN: >60
GLOBULIN: 2.2 G/DL
GLUCOSE BLD-MCNC: 99 MG/DL (ref 70–99)
HCT VFR BLD CALC: 45.3 % (ref 40.5–52.5)
HDLC SERPL-MCNC: 72 MG/DL (ref 40–60)
HEMOGLOBIN: 15 G/DL (ref 13.5–17.5)
HEPATITIS C ANTIBODY INTERPRETATION: NORMAL
LDL CHOLESTEROL CALCULATED: 130 MG/DL
LYMPHOCYTES ABSOLUTE: 2 K/UL (ref 1–5.1)
LYMPHOCYTES RELATIVE PERCENT: 39.1 %
MCH RBC QN AUTO: 29.6 PG (ref 26–34)
MCHC RBC AUTO-ENTMCNC: 33.2 G/DL (ref 31–36)
MCV RBC AUTO: 89.2 FL (ref 80–100)
MONOCYTES ABSOLUTE: 0.6 K/UL (ref 0–1.3)
MONOCYTES RELATIVE PERCENT: 11.2 %
NEUTROPHILS ABSOLUTE: 2.5 K/UL (ref 1.7–7.7)
NEUTROPHILS RELATIVE PERCENT: 47.3 %
PDW BLD-RTO: 13.6 % (ref 12.4–15.4)
PLATELET # BLD: 228 K/UL (ref 135–450)
PMV BLD AUTO: 8.5 FL (ref 5–10.5)
POTASSIUM SERPL-SCNC: 4.7 MMOL/L (ref 3.5–5.1)
RBC # BLD: 5.08 M/UL (ref 4.2–5.9)
SODIUM BLD-SCNC: 142 MMOL/L (ref 136–145)
TOTAL PROTEIN: 6.6 G/DL (ref 6.4–8.2)
TRIGL SERPL-MCNC: 72 MG/DL (ref 0–150)
VLDLC SERPL CALC-MCNC: 14 MG/DL
WBC # BLD: 5.2 K/UL (ref 4–11)

## 2020-12-12 LAB
ESTIMATED AVERAGE GLUCOSE: 114 MG/DL
HBA1C MFR BLD: 5.6 %

## 2020-12-14 RX ORDER — PRAVASTATIN SODIUM 40 MG
40 TABLET ORAL DAILY
Qty: 30 TABLET | Refills: 0 | Status: SHIPPED | OUTPATIENT
Start: 2020-12-14 | End: 2020-12-16 | Stop reason: SDUPTHER

## 2020-12-16 RX ORDER — PRAVASTATIN SODIUM 40 MG
40 TABLET ORAL DAILY
Qty: 30 TABLET | Refills: 0 | Status: SHIPPED | OUTPATIENT
Start: 2020-12-16 | End: 2021-01-12

## 2020-12-16 NOTE — TELEPHONE ENCOUNTER
Please send the Pravastatin to [facility] on [address]. ----he does not use [facility] anymore.

## 2021-01-12 DIAGNOSIS — E78.2 MIXED HYPERLIPIDEMIA: ICD-10-CM

## 2021-01-12 DIAGNOSIS — Z91.89 10 YEAR RISK OF MI OR STROKE 7.5% OR GREATER: ICD-10-CM

## 2021-01-12 RX ORDER — PRAVASTATIN SODIUM 40 MG
40 TABLET ORAL DAILY
Qty: 30 TABLET | Refills: 2 | Status: SHIPPED | OUTPATIENT
Start: 2021-01-12 | End: 2021-09-27 | Stop reason: SINTOL

## 2021-03-11 ENCOUNTER — TELEMEDICINE (OUTPATIENT)
Dept: FAMILY MEDICINE CLINIC | Age: 71
End: 2021-03-11
Payer: MEDICARE

## 2021-03-11 DIAGNOSIS — I10 ESSENTIAL HYPERTENSION: Chronic | ICD-10-CM

## 2021-03-11 DIAGNOSIS — N52.8 OTHER MALE ERECTILE DYSFUNCTION: Chronic | ICD-10-CM

## 2021-03-11 DIAGNOSIS — M17.0 PRIMARY OSTEOARTHRITIS OF BOTH KNEES: Chronic | ICD-10-CM

## 2021-03-11 DIAGNOSIS — E78.49 OTHER HYPERLIPIDEMIA: Chronic | ICD-10-CM

## 2021-03-11 DIAGNOSIS — N40.0 BENIGN PROSTATIC HYPERPLASIA, UNSPECIFIED WHETHER LOWER URINARY TRACT SYMPTOMS PRESENT: Primary | ICD-10-CM

## 2021-03-11 DIAGNOSIS — J30.1 NON-SEASONAL ALLERGIC RHINITIS DUE TO POLLEN: Chronic | ICD-10-CM

## 2021-03-11 PROCEDURE — G8427 DOCREV CUR MEDS BY ELIG CLIN: HCPCS | Performed by: FAMILY MEDICINE

## 2021-03-11 PROCEDURE — 4040F PNEUMOC VAC/ADMIN/RCVD: CPT | Performed by: FAMILY MEDICINE

## 2021-03-11 PROCEDURE — 99213 OFFICE O/P EST LOW 20 MIN: CPT | Performed by: FAMILY MEDICINE

## 2021-03-11 PROCEDURE — 3017F COLORECTAL CA SCREEN DOC REV: CPT | Performed by: FAMILY MEDICINE

## 2021-03-11 PROCEDURE — 1123F ACP DISCUSS/DSCN MKR DOCD: CPT | Performed by: FAMILY MEDICINE

## 2021-03-11 RX ORDER — NAPROXEN 500 MG/1
500 TABLET ORAL 2 TIMES DAILY PRN
Qty: 180 TABLET | Refills: 1 | Status: SHIPPED | OUTPATIENT
Start: 2021-03-11 | End: 2021-09-27 | Stop reason: SDUPTHER

## 2021-03-11 RX ORDER — SILDENAFIL 100 MG/1
100 TABLET, FILM COATED ORAL DAILY PRN
Qty: 30 TABLET | Refills: 1 | Status: SHIPPED | OUTPATIENT
Start: 2021-03-11 | End: 2021-10-28 | Stop reason: SDUPTHER

## 2021-03-11 RX ORDER — NAPROXEN 500 MG/1
500 TABLET ORAL 2 TIMES DAILY PRN
Qty: 180 TABLET | Refills: 1 | Status: SHIPPED | COMMUNITY
Start: 2021-03-11 | End: 2021-03-11 | Stop reason: SDUPTHER

## 2021-03-11 RX ORDER — SILDENAFIL 100 MG/1
100 TABLET, FILM COATED ORAL DAILY PRN
Qty: 30 TABLET | Refills: 1 | Status: SHIPPED | OUTPATIENT
Start: 2021-03-11 | End: 2021-03-11

## 2021-03-11 ASSESSMENT — PATIENT HEALTH QUESTIONNAIRE - PHQ9
2. FEELING DOWN, DEPRESSED OR HOPELESS: 0
SUM OF ALL RESPONSES TO PHQ QUESTIONS 1-9: 0
SUM OF ALL RESPONSES TO PHQ9 QUESTIONS 1 & 2: 0
SUM OF ALL RESPONSES TO PHQ QUESTIONS 1-9: 0

## 2021-03-11 NOTE — PROGRESS NOTES
Sukh Carrillo is a 78 y.o. male evaluated via telephone on 3/11/2021. Consent:  He and/or health care decision maker is aware that that he may receive a bill for this telephone service, depending on his insurance coverage, and has provided verbal consent to proceed: Yes      Documentation:  I communicated with the patient and/or health care decision maker about    Details of this discussion including any medical advice provided:   Routine 6-month follow-up  Virtual visual visit failed was converted to a telephone visit  He is doing okay  He is wintering in [place]  He tolerating his medicine without any significant side effects  He does have any chest pains or shortness of breath  Is getting the Covid immunization this month  Blood pressures at home have been good  Complains about partial erectile dysfunction was asking if anything can be done about that    Most recent labs reviewed    Medications reconciled    A- stable  p-trial of Viagra as needed  I affirm this is a Patient Initiated Episode with a Patient who has not had a related appointment within my department in the past 7 days or scheduled within the next 24 hours. Patient identification was verified at the start of the visit: Yes    Total Time: minutes: 5-10 minutes    The visit was conducted pursuant to the emergency declaration under the [facility], [facility] authority and the [employer] Act. Patient identification was verified, and a caregiver was present when appropriate. The patient was located in a state where the provider was credentialed to provide care.     Note: not billable if this call serves to triage the patient into an appointment for the relevant concern      Luis Antonio Mueller

## 2021-05-24 ENCOUNTER — OFFICE VISIT (OUTPATIENT)
Dept: FAMILY MEDICINE CLINIC | Age: 71
End: 2021-05-24
Payer: MEDICARE

## 2021-05-24 VITALS
WEIGHT: 246.4 LBS | HEIGHT: 68 IN | BODY MASS INDEX: 37.35 KG/M2 | SYSTOLIC BLOOD PRESSURE: 122 MMHG | OXYGEN SATURATION: 97 % | HEART RATE: 84 BPM | DIASTOLIC BLOOD PRESSURE: 82 MMHG

## 2021-05-24 DIAGNOSIS — K21.9 GASTROESOPHAGEAL REFLUX DISEASE, UNSPECIFIED WHETHER ESOPHAGITIS PRESENT: ICD-10-CM

## 2021-05-24 DIAGNOSIS — E78.49 OTHER HYPERLIPIDEMIA: ICD-10-CM

## 2021-05-24 DIAGNOSIS — K13.79 MOUTH SORES: Primary | ICD-10-CM

## 2021-05-24 DIAGNOSIS — I10 ESSENTIAL HYPERTENSION: ICD-10-CM

## 2021-05-24 PROCEDURE — 99213 OFFICE O/P EST LOW 20 MIN: CPT | Performed by: STUDENT IN AN ORGANIZED HEALTH CARE EDUCATION/TRAINING PROGRAM

## 2021-05-24 RX ORDER — LOSARTAN POTASSIUM 50 MG/1
50 TABLET ORAL DAILY
Qty: 90 TABLET | Refills: 1 | Status: SHIPPED | OUTPATIENT
Start: 2021-05-24 | End: 2021-12-13

## 2021-05-24 RX ORDER — OMEPRAZOLE 40 MG/1
40 CAPSULE, DELAYED RELEASE ORAL DAILY
Qty: 90 CAPSULE | Refills: 1 | Status: SHIPPED | OUTPATIENT
Start: 2021-05-24 | End: 2021-09-27 | Stop reason: SDUPTHER

## 2021-05-24 RX ORDER — EZETIMIBE 10 MG/1
10 TABLET ORAL DAILY
Qty: 30 TABLET | Refills: 3 | Status: SHIPPED | OUTPATIENT
Start: 2021-05-24 | End: 2021-09-27 | Stop reason: SDUPTHER

## 2021-05-24 ASSESSMENT — ENCOUNTER SYMPTOMS
SHORTNESS OF BREATH: 0
WHEEZING: 0
SORE THROAT: 0
NAUSEA: 0
ABDOMINAL PAIN: 0

## 2021-05-24 NOTE — PROGRESS NOTES
5/31/2021    Chaka Coley    Chief Complaint   Patient presents with    Mouth Lesions     pt states that he has a sore on his lower lip and has had it for 3 weeks . He states that it did  start closing  1 week ago . pt states that he bites his lip when he eats and whn he sleeps . It is doing better now        HPI  History was obtained from patient. Mehreen Shahid is a 78 y.o. male with a PMHx as listed below who presents today for mouth sores, not painful  Ongoing last week has not happened in the past.        Unable to tolerate statins, musle aches  Labs reviewed 12/11/2020 elevated cholesterol    1. Mouth sores    2. Essential hypertension    3. Other hyperlipidemia    4. Gastroesophageal reflux disease, unspecified whether esophagitis present             REVIEW OF SYMPTOMS    Review of Systems   Constitutional: Negative for chills and fatigue. HENT: Negative for congestion and sore throat. Respiratory: Negative for shortness of breath and wheezing. Cardiovascular: Negative for chest pain and palpitations. Gastrointestinal: Negative for abdominal pain and nausea. Genitourinary: Negative for frequency and urgency. Neurological: Negative for light-headedness.        PAST MEDICAL HISTORY  Past Medical History:   Diagnosis Date    Basal cell carcinoma of skin     RIGHT ANTERIOR SHOULDER    Benign prostatic hyperplasia     Chronic sinusitis     GERD (gastroesophageal reflux disease)     H/O seasonal allergies     Low back pain     Pancreatic abnormality     INCREASED PANCREATIC LIPASE    S/P discectomy for herniated nucleus pulposus     C6-C7       FAMILY HISTORY  Family History   Problem Relation Age of Onset    Cancer Other         FEMALE FAMILY MEMBER - UTERINE       SOCIAL HISTORY  Social History     Socioeconomic History    Marital status:      Spouse name: None    Number of children: None    Years of education: None    Highest education level: None   Occupational History    None capsule 1    ezetimibe (ZETIA) 10 MG tablet Take 1 tablet by mouth daily 30 tablet 3    sildenafil (VIAGRA) 100 MG tablet Take 1 tablet by mouth daily as needed for Erectile Dysfunction 30 tablet 1    naproxen (NAPROSYN) 500 MG tablet Take 1 tablet by mouth 2 times daily as needed (joint pain) 180 tablet 1    pravastatin (PRAVACHOL) 40 MG tablet TAKE 1 TABLET BY MOUTH DAILY 30 tablet 2    Ascorbic Acid (VITAMIN C) 250 MG tablet Take 250 mg by mouth daily      Calcium Carb-Cholecalciferol (CALCIUM-VITAMIN D) 500-200 MG-UNIT per tablet Take 1 tablet by mouth 2 times daily (with meals)      Multiple Vitamins-Minerals (HAIR SKIN AND NAILS FORMULA PO) Take 1 tablet by mouth daily      GLUCOSAMINE-CHONDROITIN DS PO Take 1 tablet by mouth daily      Sodium Chloride-Xylitol (XLEAR SINUS CARE SPRAY NA) by Nasal route 2 times daily as needed      fexofenadine (ALLEGRA) 180 MG tablet Take 180 mg by mouth as needed. No current facility-administered medications for this visit. ALLERGIES  Allergies   Allergen Reactions    Erythromycin     Other      Chloraseptic- sores on tongue    Prevacid [Lansoprazole]     Zocor [Simvastatin]     Codeine Nausea And Vomiting    Pcn [Penicillins] Rash       PHYSICAL EXAM    /82   Pulse 84   Ht 5' 8\" (1.727 m)   Wt 246 lb 6.4 oz (111.8 kg)   SpO2 97%   BMI 37.46 kg/m²     Physical Exam  Constitutional:       Appearance: Normal appearance. HENT:      Head: Normocephalic and atraumatic. Mouth/Throat:      Comments: Viral mouth sore bottom left lip  Eyes:      Extraocular Movements: Extraocular movements intact. Pupils: Pupils are equal, round, and reactive to light. Cardiovascular:      Rate and Rhythm: Normal rate and regular rhythm. Pulses: Normal pulses. Heart sounds: No murmur heard. No friction rub. No gallop. Skin:     General: Skin is warm and dry. Neurological:      General: No focal deficit present.       Mental Status:

## 2021-08-27 ENCOUNTER — VIRTUAL VISIT (OUTPATIENT)
Dept: FAMILY MEDICINE CLINIC | Age: 71
End: 2021-08-27
Payer: MEDICARE

## 2021-08-27 DIAGNOSIS — K13.0 LIP LESION: Primary | ICD-10-CM

## 2021-08-27 PROCEDURE — 99212 OFFICE O/P EST SF 10 MIN: CPT | Performed by: PHYSICIAN ASSISTANT

## 2021-08-27 NOTE — PROGRESS NOTES
8/27/2021    Jaylin De Santiago    Chief Complaint   Patient presents with    Other     growth on lip       HPI  History was obtained from pt. Jessi Small is a 73 y.o. male with a PMHx as listed below who presents today for a growth on the inside of his lower lip. This is somewhat of a Follow-up to an issue he has been having with his mouth. In May he was seen for a sore in the same spot on his lip, but that resolved. Then in it's place there grew up a growth about 1/16th of an inch, occasionally stings, reddish in color but covered with yellow that sometimes comes off when he brushes his teeth and bleed a little bit. Sometimes he will bite it at night and it will bleed a little bit but it is usually not very painful, occasionally alcoholic drinks will make it sting. Patient denies any other sores in his mouth. He is a former smoker but quit over 20 years ago. He does drink 3-4 alcoholic drinks per night. Patient has an appointment September 8 to see Dr. Genesis Brock his plastic surgeon who has taken squamous cell carcinoma off of his body in the past.    At this point the patient is most concerned that it is a major inconvenience and he wants to know whether or not it is cancerous    1.  Lip lesion           REVIEW OF SYMPTOMS    Review of Systems    PAST MEDICAL HISTORY  Past Medical History:   Diagnosis Date    Basal cell carcinoma of skin     RIGHT ANTERIOR SHOULDER    Benign prostatic hyperplasia     Chronic sinusitis     GERD (gastroesophageal reflux disease)     H/O seasonal allergies     Low back pain     Pancreatic abnormality     INCREASED PANCREATIC LIPASE    S/P discectomy for herniated nucleus pulposus     C6-C7       FAMILY HISTORY  Family History   Problem Relation Age of Onset    Cancer Other         FEMALE FAMILY MEMBER - UTERINE       SOCIAL HISTORY  Social History     Socioeconomic History    Marital status:      Spouse name: Not on file    Number of children: Not on file    Years of education: Not on file    Highest education level: Not on file   Occupational History    Not on file   Tobacco Use    Smoking status: Former Smoker     Packs/day: 1.00     Years: 13.00     Pack years: 13.00     Types: Cigarettes     Start date: 1972     Quit date: 1985     Years since quittin.0    Smokeless tobacco: Never Used   Substance and Sexual Activity    Alcohol use: No     Comment: two mixed drinks 5 days a week    Drug use: No    Sexual activity: Yes     Partners: Female   Other Topics Concern    Not on file   Social History Narrative    Not on file     Social Determinants of Health     Financial Resource Strain:     Difficulty of Paying Living Expenses:    Food Insecurity:     Worried About Running Out of Food in the Last Year:     [address] in the Last Year:    Transportation Needs:     Lack of Transportation (Medical):  Lack of Transportation (Non-Medical):    Physical Activity:     Days of Exercise per Week:     Minutes of Exercise per Session:    Stress:     Feeling of Stress :    Social Connections:     Frequency of Communication with Friends and Family:     Frequency of Social Gatherings with Friends and Family:     Attends [employer] Services:     Active Member of Clubs or Organizations:     Attends Club or Organization Meetings:     Marital Status:    Intimate Partner Violence:     Fear of Current or Ex-Partner:     Emotionally Abused:     Physically Abused:     Sexually Abused:         SURGICAL HISTORY  Past Surgical History:   Procedure Laterality Date    APPENDECTOMY      COLONOSCOPY  4/10/15    diverticulosis, colon polyps, internal hemorrhoids    KNEE SURGERY Bilateral     Scopes of both knees.  TONGUE SURGERY      Exploratory of lump under tongue. Normal tissue.     VASECTOMY  1995                 CURRENT MEDICATIONS  Current Outpatient Medications   Medication Sig Dispense Refill    losartan (COZAAR) 50 MG tablet Take 1 tablet by mouth daily 90 tablet 1    omeprazole (PRILOSEC) 40 MG delayed release capsule Take 1 capsule by mouth daily 90 capsule 1    ezetimibe (ZETIA) 10 MG tablet Take 1 tablet by mouth daily 30 tablet 3    sildenafil (VIAGRA) 100 MG tablet Take 1 tablet by mouth daily as needed for Erectile Dysfunction 30 tablet 1    naproxen (NAPROSYN) 500 MG tablet Take 1 tablet by mouth 2 times daily as needed (joint pain) 180 tablet 1    pravastatin (PRAVACHOL) 40 MG tablet TAKE 1 TABLET BY MOUTH DAILY 30 tablet 2    Ascorbic Acid (VITAMIN C) 250 MG tablet Take 250 mg by mouth daily      Calcium Carb-Cholecalciferol (CALCIUM-VITAMIN D) 500-200 MG-UNIT per tablet Take 1 tablet by mouth 2 times daily (with meals)      Multiple Vitamins-Minerals (HAIR SKIN AND NAILS FORMULA PO) Take 1 tablet by mouth daily      GLUCOSAMINE-CHONDROITIN DS PO Take 1 tablet by mouth daily      Sodium Chloride-Xylitol (XLEAR SINUS CARE SPRAY NA) by Nasal route 2 times daily as needed      fexofenadine (ALLEGRA) 180 MG tablet Take 180 mg by mouth as needed. No current facility-administered medications for this visit. ALLERGIES  Allergies   Allergen Reactions    Erythromycin     Other      Chloraseptic- sores on tongue    Prevacid [Lansoprazole]     Zocor [Simvastatin]     Codeine Nausea And Vomiting    Pcn [Penicillins] Rash       PHYSICAL EXAM    There were no vitals taken for this visit. Physical Exam  HENT:      Mouth/Throat:           ASSESSMENT & PLAN    1. Lip lesion  Patient to request biopsy at appointment with plastic surgeon next week    Discussed viscous lidocaine application when it is irritable and to reduce trauma to the lesion      Return if symptoms worsen or fail to improve. Electronically signed by JERMAINE Liriano on 8/27/2021    Neno Briseno, was evaluated through a synchronous (real-time) audio-video encounter.  The patient (or guardian if applicable) is aware that this is a billable service. Verbal consent to proceed has been obtained within the past 12 months. The visit was conducted pursuant to the emergency declaration under the [address] [place] [address] and the [employer] and [employer] General Act. Patient identification was verified, and a caregiver was present when appropriate. The patient was located in a state where the provider was credentialed to provide care. Total time spent for this encounter: 16min    --JERMAINE Santos on 8/27/2021 at 8:58 AM    An electronic signature was used to authenticate this note. Comment: Please note this report has been produced using speech recognition software and may contain errors related to that system including errors in grammar, punctuation, and spelling, as well as words and phrases that may be inappropriate. If there are any questions or concerns please feel free to contact the dictating provider for clarification.

## 2021-09-27 ENCOUNTER — OFFICE VISIT (OUTPATIENT)
Dept: FAMILY MEDICINE CLINIC | Age: 71
End: 2021-09-27
Payer: MEDICARE

## 2021-09-27 VITALS
BODY MASS INDEX: 37.44 KG/M2 | HEART RATE: 76 BPM | HEIGHT: 68 IN | WEIGHT: 247 LBS | OXYGEN SATURATION: 97 % | DIASTOLIC BLOOD PRESSURE: 86 MMHG | SYSTOLIC BLOOD PRESSURE: 138 MMHG

## 2021-09-27 DIAGNOSIS — I10 ESSENTIAL HYPERTENSION: ICD-10-CM

## 2021-09-27 DIAGNOSIS — K21.9 GASTROESOPHAGEAL REFLUX DISEASE, UNSPECIFIED WHETHER ESOPHAGITIS PRESENT: ICD-10-CM

## 2021-09-27 DIAGNOSIS — E78.49 OTHER HYPERLIPIDEMIA: ICD-10-CM

## 2021-09-27 DIAGNOSIS — J30.1 NON-SEASONAL ALLERGIC RHINITIS DUE TO POLLEN: Chronic | ICD-10-CM

## 2021-09-27 DIAGNOSIS — Z23 FLU VACCINE NEED: Primary | ICD-10-CM

## 2021-09-27 PROCEDURE — G8427 DOCREV CUR MEDS BY ELIG CLIN: HCPCS | Performed by: FAMILY MEDICINE

## 2021-09-27 PROCEDURE — G0008 ADMIN INFLUENZA VIRUS VAC: HCPCS | Performed by: FAMILY MEDICINE

## 2021-09-27 PROCEDURE — 1036F TOBACCO NON-USER: CPT | Performed by: FAMILY MEDICINE

## 2021-09-27 PROCEDURE — 99213 OFFICE O/P EST LOW 20 MIN: CPT | Performed by: FAMILY MEDICINE

## 2021-09-27 PROCEDURE — G0009 ADMIN PNEUMOCOCCAL VACCINE: HCPCS | Performed by: FAMILY MEDICINE

## 2021-09-27 PROCEDURE — 4040F PNEUMOC VAC/ADMIN/RCVD: CPT | Performed by: FAMILY MEDICINE

## 2021-09-27 PROCEDURE — G8417 CALC BMI ABV UP PARAM F/U: HCPCS | Performed by: FAMILY MEDICINE

## 2021-09-27 PROCEDURE — 3017F COLORECTAL CA SCREEN DOC REV: CPT | Performed by: FAMILY MEDICINE

## 2021-09-27 PROCEDURE — 1123F ACP DISCUSS/DSCN MKR DOCD: CPT | Performed by: FAMILY MEDICINE

## 2021-09-27 PROCEDURE — 90694 VACC AIIV4 NO PRSRV 0.5ML IM: CPT | Performed by: FAMILY MEDICINE

## 2021-09-27 PROCEDURE — 90732 PPSV23 VACC 2 YRS+ SUBQ/IM: CPT | Performed by: FAMILY MEDICINE

## 2021-09-27 RX ORDER — NAPROXEN 500 MG/1
500 TABLET ORAL 2 TIMES DAILY PRN
Qty: 180 TABLET | Refills: 1 | Status: SHIPPED | OUTPATIENT
Start: 2021-09-27

## 2021-09-27 RX ORDER — EZETIMIBE 10 MG/1
10 TABLET ORAL DAILY
Qty: 30 TABLET | Refills: 3 | Status: SHIPPED | OUTPATIENT
Start: 2021-09-27

## 2021-09-27 RX ORDER — OMEPRAZOLE 40 MG/1
40 CAPSULE, DELAYED RELEASE ORAL DAILY
Qty: 90 CAPSULE | Refills: 1 | Status: SHIPPED | OUTPATIENT
Start: 2021-09-27 | End: 2022-06-14 | Stop reason: CLARIF

## 2021-09-27 NOTE — PROGRESS NOTES
2021     [facility]      Chief Complaint   Patient presents with    Check-Up     4 month follow up    Other     pt reports no concerns       HPI      Adán Stanford is a 73 y.o. male who presents today with the followin. Flu vaccine need    2. Other hyperlipidemia    3. Gastroesophageal reflux disease, unspecified whether esophagitis present    4. Essential hypertension    5.  Non-seasonal allergic rhinitis due to pollen    He is here for recheck  He has a history of hypertension  He has hyperlipidemia but he is statin intolerant    REVIEW OF SYMPTOMS    Review of Systems   Musculoskeletal:        Osteoarthritis predominantly of his knees  He is on naproxen       PAST MEDICAL HISTORY  Past Medical History:   Diagnosis Date    Basal cell carcinoma of skin     RIGHT ANTERIOR SHOULDER    Benign prostatic hyperplasia     Chronic sinusitis     GERD (gastroesophageal reflux disease)     H/O seasonal allergies     Low back pain     Pancreatic abnormality     INCREASED PANCREATIC LIPASE    S/P discectomy for herniated nucleus pulposus     C6-C7       FAMILY HISTORY  Family History   Problem Relation Age of Onset    Cancer Other         FEMALE FAMILY MEMBER - UTERINE       SOCIAL HISTORY  Social History     Socioeconomic History    Marital status:      Spouse name: None    Number of children: None    Years of education: None    Highest education level: None   Occupational History    None   Tobacco Use    Smoking status: Former Smoker     Packs/day: 1.00     Years: 13.00     Pack years: 13.00     Types: Cigarettes     Start date: 1972     Quit date: 1985     Years since quittin.1    Smokeless tobacco: Never Used   Substance and Sexual Activity    Alcohol use: No     Comment: two mixed drinks 5 days a week    Drug use: No    Sexual activity: Yes     Partners: Female   Other Topics Concern    None   Social History Narrative    None     Social Determinants of Health Financial Resource Strain:     Difficulty of Paying Living Expenses:    Food Insecurity:     Worried About Running Out of Food in the Last Year:     [address] in the Last Year:    Transportation Needs:     Lack of Transportation (Medical):  Lack of Transportation (Non-Medical):    Physical Activity:     Days of Exercise per Week:     Minutes of Exercise per Session:    Stress:     Feeling of Stress :    Social Connections:     Frequency of Communication with Friends and Family:     Frequency of Social Gatherings with Friends and Family:     Attends [employer] Services:     Active Member of Clubs or Organizations:     Attends Club or Organization Meetings:     Marital Status:    Intimate Partner Violence:     Fear of Current or Ex-Partner:     Emotionally Abused:     Physically Abused:     Sexually Abused:         SURGICAL HISTORY  Past Surgical History:   Procedure Laterality Date    APPENDECTOMY      COLONOSCOPY  4/10/15    diverticulosis, colon polyps, internal hemorrhoids    KNEE SURGERY Bilateral     Scopes of both knees.  TONGUE SURGERY      Exploratory of lump under tongue. Normal tissue.     VASECTOMY  05/11/1995       CURRENT MEDICATIONS  Current Outpatient Medications   Medication Sig Dispense Refill    ezetimibe (ZETIA) 10 MG tablet Take 1 tablet by mouth daily 30 tablet 3    naproxen (NAPROSYN) 500 MG tablet Take 1 tablet by mouth 2 times daily as needed (joint pain) 180 tablet 1    omeprazole (PRILOSEC) 40 MG delayed release capsule Take 1 capsule by mouth daily 90 capsule 1    losartan (COZAAR) 50 MG tablet Take 1 tablet by mouth daily 90 tablet 1    sildenafil (VIAGRA) 100 MG tablet Take 1 tablet by mouth daily as needed for Erectile Dysfunction 30 tablet 1    Ascorbic Acid (VITAMIN C) 250 MG tablet Take 250 mg by mouth daily      Calcium Carb-Cholecalciferol (CALCIUM-VITAMIN D) 500-200 MG-UNIT per tablet Take 1 tablet by mouth 2 times daily (with meals)      Multiple Vitamins-Minerals (HAIR SKIN AND NAILS FORMULA PO) Take 1 tablet by mouth daily      fexofenadine (ALLEGRA) 180 MG tablet Take 180 mg by mouth as needed.  pravastatin (PRAVACHOL) 40 MG tablet TAKE 1 TABLET BY MOUTH DAILY (Patient not taking: Reported on 9/27/2021) 30 tablet 2    GLUCOSAMINE-CHONDROITIN DS PO Take 1 tablet by mouth daily (Patient not taking: Reported on 9/27/2021)       No current facility-administered medications for this visit. ALLERGIES  Allergies   Allergen Reactions    Erythromycin     Other      Chloraseptic- sores on tongue    Prevacid [Lansoprazole]     Zocor [Simvastatin]     Codeine Nausea And Vomiting    Pcn [Penicillins] Rash       PHYSICAL EXAM    /86   Pulse 76   Ht 5' 8\" (1.727 m)   Wt 247 lb (112 kg)   SpO2 97%   BMI 37.56 kg/m²     Physical Exam  Vitals and nursing note reviewed. Constitutional:       Appearance: Normal appearance. Cardiovascular:      Rate and Rhythm: Normal rate. Pulmonary:      Effort: Pulmonary effort is normal.     Reviewed immunization  Reviewed most recent labs      ASSESSMENT and PLAN    Influenza and Pneumovax 23 administered today    No follow-ups on file. Electronically signed by Radhika Fisher MD on 9/27/2021    Please note that this chart was generated using dragon dictation software. Although every effort was made to ensure the accuracy of this automated transcription, some errors in transcription may have occurred.

## 2021-10-05 ENCOUNTER — OFFICE VISIT (OUTPATIENT)
Dept: FAMILY MEDICINE CLINIC | Age: 71
End: 2021-10-05
Payer: MEDICARE

## 2021-10-05 VITALS
BODY MASS INDEX: 37.56 KG/M2 | OXYGEN SATURATION: 97 % | DIASTOLIC BLOOD PRESSURE: 78 MMHG | HEIGHT: 68 IN | SYSTOLIC BLOOD PRESSURE: 146 MMHG | HEART RATE: 78 BPM

## 2021-10-05 DIAGNOSIS — S40.812A ABRASION OF LEFT UPPER EXTREMITY, INITIAL ENCOUNTER: Primary | ICD-10-CM

## 2021-10-05 PROCEDURE — 1123F ACP DISCUSS/DSCN MKR DOCD: CPT | Performed by: FAMILY MEDICINE

## 2021-10-05 PROCEDURE — 99213 OFFICE O/P EST LOW 20 MIN: CPT | Performed by: FAMILY MEDICINE

## 2021-10-05 PROCEDURE — G8484 FLU IMMUNIZE NO ADMIN: HCPCS | Performed by: FAMILY MEDICINE

## 2021-10-05 PROCEDURE — G8427 DOCREV CUR MEDS BY ELIG CLIN: HCPCS | Performed by: FAMILY MEDICINE

## 2021-10-05 PROCEDURE — 4040F PNEUMOC VAC/ADMIN/RCVD: CPT | Performed by: FAMILY MEDICINE

## 2021-10-05 PROCEDURE — G8417 CALC BMI ABV UP PARAM F/U: HCPCS | Performed by: FAMILY MEDICINE

## 2021-10-05 PROCEDURE — 90471 IMMUNIZATION ADMIN: CPT | Performed by: FAMILY MEDICINE

## 2021-10-05 PROCEDURE — 1036F TOBACCO NON-USER: CPT | Performed by: FAMILY MEDICINE

## 2021-10-05 PROCEDURE — 90715 TDAP VACCINE 7 YRS/> IM: CPT | Performed by: FAMILY MEDICINE

## 2021-10-05 PROCEDURE — 3017F COLORECTAL CA SCREEN DOC REV: CPT | Performed by: FAMILY MEDICINE

## 2021-10-05 ASSESSMENT — ENCOUNTER SYMPTOMS
BLOOD IN STOOL: 0
DIARRHEA: 0
VOMITING: 0
SHORTNESS OF BREATH: 0
TROUBLE SWALLOWING: 0
EYE PAIN: 0
CHEST TIGHTNESS: 0
WHEEZING: 0
ABDOMINAL PAIN: 0
NAUSEA: 0

## 2021-10-05 NOTE — PROGRESS NOTES
10/5/2021    Sandra Matthews    Chief Complaint   Patient presents with    Other     scraped by antonina nail on left forearm - tetnus shot??       HPI  History was obtained from patient with history of left forearm scratch on a antonina nail few days ago seems to be healing well has been 10 years since his last Tdap. Recent lab work looks good denies other acute anterior problems GERD symptoms are fine meds are tolerated. REVIEW OF SYMPTOMS    Review of Systems   Constitutional: Negative for activity change and fatigue. HENT: Negative for congestion, hearing loss, mouth sores and trouble swallowing. Eyes: Negative for pain and visual disturbance. Respiratory: Negative for chest tightness, shortness of breath and wheezing. Cardiovascular: Negative for chest pain and palpitations. Gastrointestinal: Negative for abdominal pain, blood in stool, diarrhea, nausea and vomiting. Genitourinary: Negative for dysuria, frequency and urgency. Musculoskeletal: Negative for arthralgias, gait problem and neck stiffness. Skin: Negative for rash. Patient with irritated rash left forearm several days old- cut on antonina nail   Allergic/Immunologic: Negative for environmental allergies. Neurological: Negative for dizziness, seizures, speech difficulty and weakness. Hematological: Does not bruise/bleed easily. Psychiatric/Behavioral: Negative for agitation, confusion and hallucinations.        PAST MEDICAL HISTORY  Past Medical History:   Diagnosis Date    Basal cell carcinoma of skin     RIGHT ANTERIOR SHOULDER    Benign prostatic hyperplasia     Chronic sinusitis     GERD (gastroesophageal reflux disease)     H/O seasonal allergies     Low back pain     Pancreatic abnormality     INCREASED PANCREATIC LIPASE    S/P discectomy for herniated nucleus pulposus     C6-C7       FAMILY HISTORY  Family History   Problem Relation Age of Onset    Cancer Other         FEMALE FAMILY MEMBER - UTERINE SOCIAL HISTORY  Social History     Socioeconomic History    Marital status:      Spouse name: Not on file    Number of children: Not on file    Years of education: Not on file    Highest education level: Not on file   Occupational History    Not on file   Tobacco Use    Smoking status: Former Smoker     Packs/day: 1.00     Years: 13.00     Pack years: 13.00     Types: Cigarettes     Start date: 1972     Quit date: 1985     Years since quittin.1    Smokeless tobacco: Never Used   Substance and Sexual Activity    Alcohol use: No     Comment: two mixed drinks 5 days a week    Drug use: No    Sexual activity: Yes     Partners: Female   Other Topics Concern    Not on file   Social History Narrative    Not on file     Social Determinants of Health     Financial Resource Strain:     Difficulty of Paying Living Expenses:    Food Insecurity:     Worried About Running Out of Food in the Last Year:     [address] in the Last Year:    Transportation Needs:     Lack of Transportation (Medical):  Lack of Transportation (Non-Medical):    Physical Activity:     Days of Exercise per Week:     Minutes of Exercise per Session:    Stress:     Feeling of Stress :    Social Connections:     Frequency of Communication with Friends and Family:     Frequency of Social Gatherings with Friends and Family:     Attends Jehovah's witness Services:     Active Member of Clubs or Organizations:     Attends Club or Organization Meetings:     Marital Status:    Intimate Partner Violence:     Fear of Current or Ex-Partner:     Emotionally Abused:     Physically Abused:     Sexually Abused:         SURGICAL HISTORY  Past Surgical History:   Procedure Laterality Date    APPENDECTOMY      COLONOSCOPY  4/10/15    diverticulosis, colon polyps, internal hemorrhoids    KNEE SURGERY Bilateral     Scopes of both knees.  TONGUE SURGERY      Exploratory of lump under tongue. Normal tissue.     VASECTOMY  05/11/1995                 CURRENT MEDICATIONS  Current Outpatient Medications   Medication Sig Dispense Refill    ezetimibe (ZETIA) 10 MG tablet Take 1 tablet by mouth daily 30 tablet 3    naproxen (NAPROSYN) 500 MG tablet Take 1 tablet by mouth 2 times daily as needed (joint pain) 180 tablet 1    omeprazole (PRILOSEC) 40 MG delayed release capsule Take 1 capsule by mouth daily 90 capsule 1    losartan (COZAAR) 50 MG tablet Take 1 tablet by mouth daily 90 tablet 1    sildenafil (VIAGRA) 100 MG tablet Take 1 tablet by mouth daily as needed for Erectile Dysfunction 30 tablet 1    Ascorbic Acid (VITAMIN C) 250 MG tablet Take 250 mg by mouth daily      Calcium Carb-Cholecalciferol (CALCIUM-VITAMIN D) 500-200 MG-UNIT per tablet Take 1 tablet by mouth 2 times daily (with meals)      Multiple Vitamins-Minerals (HAIR SKIN AND NAILS FORMULA PO) Take 1 tablet by mouth daily      fexofenadine (ALLEGRA) 180 MG tablet Take 180 mg by mouth as needed.  GLUCOSAMINE-CHONDROITIN DS PO Take 1 tablet by mouth daily (Patient not taking: Reported on 9/27/2021)       No current facility-administered medications for this visit. ALLERGIES  Allergies   Allergen Reactions    Erythromycin     Other      Chloraseptic- sores on tongue    Prevacid [Lansoprazole]     Zocor [Simvastatin]     Codeine Nausea And Vomiting    Pcn [Penicillins] Rash       PHYSICAL EXAM    BP (!) 146/78 (Site: Left Upper Arm, Position: Sitting, Cuff Size: Medium Adult)   Pulse 78   Ht 5' 8\" (1.727 m)   SpO2 97%   BMI 37.56 kg/m²     Physical Exam  Vitals and nursing note reviewed. Constitutional:       Appearance: He is well-developed. HENT:      Head: Normocephalic and atraumatic. Eyes:      Pupils: Pupils are equal, round, and reactive to light. Cardiovascular:      Rate and Rhythm: Normal rate and regular rhythm. Heart sounds: Normal heart sounds.    Pulmonary:      Effort: Pulmonary effort is normal.      Breath sounds: Normal breath sounds. Abdominal:      Palpations: Abdomen is soft. Musculoskeletal:         General: Normal range of motion. Cervical back: Normal range of motion and neck supple. Skin:     General: Skin is warm and dry. Comments: Patient with 1/2 inch superficial scratch left forearm volar aspect red no drainage noted. No other injury noted. Seems to be starting to heal well   Neurological:      General: No focal deficit present. Mental Status: He is alert and oriented to person, place, and time. Mental status is at baseline. Cranial Nerves: No cranial nerve deficit. Motor: No weakness. Gait: Gait normal.   Psychiatric:         Mood and Affect: Mood normal.         Behavior: Behavior normal.         Thought Content: Thought content normal.         ASSESSMENT & PLAN     Diagnosis Orders   1. Abrasion of left upper extremity, initial encounter     At this point will give patient a Tdap. He is to continue on usual regimen ,continue with healthy lifestyle, and call with questions or problems. Return if symptoms worsen or fail to improve.          Electronically signed by Madhuri Melchor MD on 10/5/2021

## 2021-10-07 ENCOUNTER — VIRTUAL VISIT (OUTPATIENT)
Dept: FAMILY MEDICINE CLINIC | Age: 71
End: 2021-10-07
Payer: MEDICARE

## 2021-10-07 DIAGNOSIS — Z00.00 ROUTINE GENERAL MEDICAL EXAMINATION AT A HEALTH CARE FACILITY: Primary | ICD-10-CM

## 2021-10-07 PROCEDURE — G0438 PPPS, INITIAL VISIT: HCPCS | Performed by: STUDENT IN AN ORGANIZED HEALTH CARE EDUCATION/TRAINING PROGRAM

## 2021-10-07 SDOH — ECONOMIC STABILITY: FOOD INSECURITY: WITHIN THE PAST 12 MONTHS, YOU WORRIED THAT YOUR FOOD WOULD RUN OUT BEFORE YOU GOT MONEY TO BUY MORE.: NEVER TRUE

## 2021-10-07 SDOH — ECONOMIC STABILITY: FOOD INSECURITY: WITHIN THE PAST 12 MONTHS, THE FOOD YOU BOUGHT JUST DIDN'T LAST AND YOU DIDN'T HAVE MONEY TO GET MORE.: NEVER TRUE

## 2021-10-07 ASSESSMENT — LIFESTYLE VARIABLES
HOW MANY STANDARD DRINKS CONTAINING ALCOHOL DO YOU HAVE ON A TYPICAL DAY: 0
AUDIT-C TOTAL SCORE: 4
HOW OFTEN DURING THE LAST YEAR HAVE YOU FAILED TO DO WHAT WAS NORMALLY EXPECTED FROM YOU BECAUSE OF DRINKING: 0
HAS A RELATIVE, FRIEND, DOCTOR, OR ANOTHER HEALTH PROFESSIONAL EXPRESSED CONCERN ABOUT YOUR DRINKING OR SUGGESTED YOU CUT DOWN: 0
HOW OFTEN DURING THE LAST YEAR HAVE YOU NEEDED AN ALCOHOLIC DRINK FIRST THING IN THE MORNING TO GET YOURSELF GOING AFTER A NIGHT OF HEAVY DRINKING: 0
HOW OFTEN DURING THE LAST YEAR HAVE YOU BEEN UNABLE TO REMEMBER WHAT HAPPENED THE NIGHT BEFORE BECAUSE YOU HAD BEEN DRINKING: 0
HOW OFTEN DURING THE LAST YEAR HAVE YOU HAD A FEELING OF GUILT OR REMORSE AFTER DRINKING: 0
HOW OFTEN DO YOU HAVE A DRINK CONTAINING ALCOHOL: 4
HAVE YOU OR SOMEONE ELSE BEEN INJURED AS A RESULT OF YOUR DRINKING: 0
HOW OFTEN DO YOU HAVE SIX OR MORE DRINKS ON ONE OCCASION: 0
HOW OFTEN DURING THE LAST YEAR HAVE YOU FOUND THAT YOU WERE NOT ABLE TO STOP DRINKING ONCE YOU HAD STARTED: 0
AUDIT TOTAL SCORE: 4

## 2021-10-07 ASSESSMENT — SOCIAL DETERMINANTS OF HEALTH (SDOH): HOW HARD IS IT FOR YOU TO PAY FOR THE VERY BASICS LIKE FOOD, HOUSING, MEDICAL CARE, AND HEATING?: NOT HARD AT ALL

## 2021-10-07 ASSESSMENT — PATIENT HEALTH QUESTIONNAIRE - PHQ9
2. FEELING DOWN, DEPRESSED OR HOPELESS: 0
SUM OF ALL RESPONSES TO PHQ QUESTIONS 1-9: 0
1. LITTLE INTEREST OR PLEASURE IN DOING THINGS: 0
SUM OF ALL RESPONSES TO PHQ9 QUESTIONS 1 & 2: 0
SUM OF ALL RESPONSES TO PHQ QUESTIONS 1-9: 0
SUM OF ALL RESPONSES TO PHQ QUESTIONS 1-9: 0

## 2021-10-07 NOTE — PROGRESS NOTES
Medicare Annual Wellness Visit  Name: Amy Remy Date: 10/7/2021   MRN: [id number] Sex: Male   Age: 73 y.o. Ethnicity: Non- / Non    : 1950 Race: White (non-)      Lew Ballesteros is here for Medicare AWV    Screenings for behavioral, psychosocial and functional/safety risks, and cognitive dysfunction are all negative except as indicated below. These results, as well as other patient data from the [address] form, are documented in Flowsheets linked to this Encounter. Allergies   Allergen Reactions    Erythromycin     Other      Chloraseptic- sores on tongue    Prevacid [Lansoprazole]     Zocor [Simvastatin]     Codeine Nausea And Vomiting    Pcn [Penicillins] Rash       Prior to Visit Medications    Medication Sig Taking? Authorizing Provider   ezetimibe (ZETIA) 10 MG tablet Take 1 tablet by mouth daily Yes Enrico Tapia MD   naproxen (NAPROSYN) 500 MG tablet Take 1 tablet by mouth 2 times daily as needed (joint pain) Yes Enrico Tapia MD   omeprazole (PRILOSEC) 40 MG delayed release capsule Take 1 capsule by mouth daily Yes Enrico Tapia MD   losartan (COZAAR) 50 MG tablet Take 1 tablet by mouth daily Yes Gilda Javier DO   sildenafil (VIAGRA) 100 MG tablet Take 1 tablet by mouth daily as needed for Erectile Dysfunction Yes Enrico Tapia MD   Ascorbic Acid (VITAMIN C) 250 MG tablet Take 250 mg by mouth daily Yes Historical Provider, MD   Calcium Carb-Cholecalciferol (CALCIUM-VITAMIN D) 500-200 MG-UNIT per tablet Take 1 tablet by mouth 2 times daily (with meals) Yes Historical Provider, MD   Multiple Vitamins-Minerals (HAIR SKIN AND NAILS FORMULA PO) Take 1 tablet by mouth daily Yes Historical Provider, MD   fexofenadine (ALLEGRA) 180 MG tablet Take 180 mg by mouth as needed.  Yes Historical Provider, MD   GLUCOSAMINE-CHONDROITIN DS PO Take 1 tablet by mouth daily  Patient not taking: Reported on 2021  Historical Fluzone, Flulaval, Fluarix, and 3 yrs and older Afluria) 11/05/2013, 12/03/2014    Influenza, Quadv, adjuvanted, 65 yrs +, IM, PF (Fluad) 09/11/2020, 09/27/2021    Pneumococcal Polysaccharide ([id number]) 09/27/2021    Tdap (Boostrix, Adacel) 10/27/2011, 10/05/2021        Health Maintenance   Topic Date Due    Shingles Vaccine (1 of 2) Never done   [employer] Visit (AWV)  Never done    Potassium monitoring  10/01/2022    Creatinine monitoring  10/01/2022    Colon cancer screen colonoscopy  10/28/2025    Lipid screen  10/01/2026    DTaP/Tdap/Td vaccine (3 - Td or Tdap) 10/05/2031    Flu vaccine  Completed    Pneumococcal 65+ years Vaccine  Completed    COVID-19 Vaccine  Completed    AAA screen  Completed    Hepatitis C screen  Completed    Hepatitis A vaccine  Aged Out    Hepatitis B vaccine  Aged Out    Hib vaccine  Aged Out    Meningococcal (ACWY) vaccine  Aged Out     Recommendations for [employer] Due: see orders and patient instructions/AVS. Patient states he will get the COVID booster first when [address] comes out with theirs, then get shingles vaccinations. Unable to obtain 3 vital signs due to patient not having equipment to take blood pressure/temperature. Recommended screening schedule for the next 5-10 years is provided to the patient in written form: see Patient Instructions/AVS.    Giovani OLEARY LPN, 30/0/6373, performed the documented evaluation under the direct supervision of the attending physician. Vanessa Bryant, was evaluated through a synchronous (real-time) audio encounter. The patient (or guardian if applicable) is aware that this is a billable service. Verbal consent to proceed has been obtained within the past 12 months. The visit was conducted pursuant to the emergency declaration under the [facility], [address] [place] [address] authority and the Language [employer] and [employer] General Act. Patient identification was verified, and a caregiver was present when appropriate. The patient was located in the state of [place], where the provider was credentialed to provide care. Total time spent for this encounter: Not billed by time    --Leticia Green LPN on 92/6/9159 at 1:38 AM    An electronic signature was used to authenticate this note. This encounter was performed under Nereyda silverman DOs, direct supervision, 10/7/2021.

## 2021-10-07 NOTE — PATIENT INSTRUCTIONS
Personalized Preventive Plan for Lala Graves - 10/7/2021  Medicare offers a range of preventive health benefits. Some of the tests and screenings are paid in full while other may be subject to a deductible, co-insurance, and/or copay. Some of these benefits include a comprehensive review of your medical history including lifestyle, illnesses that may run in your family, and various assessments and screenings as appropriate. After reviewing your medical record and screening and assessments performed today your provider may have ordered immunizations, labs, imaging, and/or referrals for you. A list of these orders (if applicable) as well as your Preventive Care list are included within your After Visit Summary for your review. Other Preventive Recommendations:    · A preventive eye exam performed by an eye specialist is recommended every 1-2 years to screen for glaucoma; cataracts, macular degeneration, and other eye disorders. · A preventive dental visit is recommended every 6 months. · Try to get at least 150 minutes of exercise per week or 10,000 steps per day on a pedometer . · Order or download the FREE \"Exercise & Physical Activity: Your Everyday Guide\" from [employer]. Call 7-257.306.2181 or search [employer] online. · You need 6612-3562 mg of calcium and 9687-9241 IU of vitamin D per day. It is possible to meet your calcium requirement with diet alone, but a vitamin D supplement is usually necessary to meet this goal.  · When exposed to the sun, use a sunscreen that protects against both UVA and UVB radiation with an SPF of 30 or greater. Reapply every 2 to 3 hours or after sweating, drying off with a towel, or swimming. · Always wear a seat belt when traveling in a car. Always wear a helmet when riding a bicycle or motorcycle.

## 2021-10-28 RX ORDER — SILDENAFIL 100 MG/1
100 TABLET, FILM COATED ORAL DAILY PRN
Qty: 30 TABLET | Refills: 1 | Status: SHIPPED | OUTPATIENT
Start: 2021-10-28

## 2021-11-26 ENCOUNTER — APPOINTMENT (OUTPATIENT)
Dept: GENERAL RADIOLOGY | Age: 71
End: 2021-11-26
Payer: MEDICARE

## 2021-11-26 ENCOUNTER — HOSPITAL ENCOUNTER (EMERGENCY)
Age: 71
Discharge: HOME | End: 2021-11-26
Attending: EMERGENCY MEDICINE
Payer: MEDICARE

## 2021-11-26 VITALS
SYSTOLIC BLOOD PRESSURE: 135 MMHG | HEIGHT: 69 IN | WEIGHT: 245 LBS | TEMPERATURE: 98.1 F | OXYGEN SATURATION: 95 % | BODY MASS INDEX: 36.29 KG/M2 | HEART RATE: 79 BPM | RESPIRATION RATE: 14 BRPM | DIASTOLIC BLOOD PRESSURE: 84 MMHG

## 2021-11-26 DIAGNOSIS — R07.9 CHEST PAIN, UNSPECIFIED TYPE: ICD-10-CM

## 2021-11-26 DIAGNOSIS — E78.5 HYPERLIPIDEMIA, UNSPECIFIED HYPERLIPIDEMIA TYPE: ICD-10-CM

## 2021-11-26 DIAGNOSIS — I48.92 ATRIAL FLUTTER WITH RAPID VENTRICULAR RESPONSE (HCC): ICD-10-CM

## 2021-11-26 DIAGNOSIS — I10 ESSENTIAL HYPERTENSION: ICD-10-CM

## 2021-11-26 DIAGNOSIS — I48.92 NEW ONSET ATRIAL FLUTTER (HCC): Primary | ICD-10-CM

## 2021-11-26 LAB
ALBUMIN SERPL-MCNC: 4.5 GM/DL (ref 3.4–5)
ALP BLD-CCNC: 79 IU/L (ref 40–129)
ALT SERPL-CCNC: 22 U/L (ref 10–40)
ANION GAP SERPL CALCULATED.3IONS-SCNC: 15 MMOL/L (ref 4–16)
APTT: 29.9 SECONDS (ref 25.1–37.1)
AST SERPL-CCNC: 19 IU/L (ref 15–37)
BASOPHILS ABSOLUTE: 0.1 K/CU MM
BASOPHILS RELATIVE PERCENT: 0.8 % (ref 0–1)
BILIRUB SERPL-MCNC: 0.2 MG/DL (ref 0–1)
BUN BLDV-MCNC: 18 MG/DL (ref 6–23)
CALCIUM SERPL-MCNC: 8.8 MG/DL (ref 8.3–10.6)
CHLORIDE BLD-SCNC: 107 MMOL/L (ref 99–110)
CO2: 21 MMOL/L (ref 21–32)
CREAT SERPL-MCNC: 0.8 MG/DL (ref 0.9–1.3)
D DIMER: <200 NG/ML(DDU)
DIFFERENTIAL TYPE: ABNORMAL
EKG ATRIAL RATE: 100 BPM
EKG ATRIAL RATE: 290 BPM
EKG DIAGNOSIS: NORMAL
EKG DIAGNOSIS: NORMAL
EKG P AXIS: 25 DEGREES
EKG P AXIS: 250 DEGREES
EKG P-R INTERVAL: 164 MS
EKG Q-T INTERVAL: 296 MS
EKG Q-T INTERVAL: 350 MS
EKG QRS DURATION: 82 MS
EKG QRS DURATION: 82 MS
EKG QTC CALCULATION (BAZETT): 451 MS
EKG QTC CALCULATION (BAZETT): 459 MS
EKG R AXIS: -30 DEGREES
EKG R AXIS: -42 DEGREES
EKG T AXIS: -29 DEGREES
EKG T AXIS: 0 DEGREES
EKG VENTRICULAR RATE: 100 BPM
EKG VENTRICULAR RATE: 145 BPM
EOSINOPHILS ABSOLUTE: 0.2 K/CU MM
EOSINOPHILS RELATIVE PERCENT: 3 % (ref 0–3)
GFR AFRICAN AMERICAN: >60 ML/MIN/1.73M2
GFR NON-AFRICAN AMERICAN: >60 ML/MIN/1.73M2
GLUCOSE BLD-MCNC: 148 MG/DL (ref 70–99)
HCT VFR BLD CALC: 49.4 % (ref 42–52)
HEMOGLOBIN: 16.5 GM/DL (ref 13.5–18)
IMMATURE NEUTROPHIL %: 0.1 % (ref 0–0.43)
INR BLD: 0.86 INDEX
LYMPHOCYTES ABSOLUTE: 3.2 K/CU MM
LYMPHOCYTES RELATIVE PERCENT: 44.7 % (ref 24–44)
MAGNESIUM: 2.3 MG/DL (ref 1.8–2.4)
MCH RBC QN AUTO: 30.3 PG (ref 27–31)
MCHC RBC AUTO-ENTMCNC: 33.4 % (ref 32–36)
MCV RBC AUTO: 90.6 FL (ref 78–100)
MONOCYTES ABSOLUTE: 0.8 K/CU MM
MONOCYTES RELATIVE PERCENT: 11 % (ref 0–4)
PDW BLD-RTO: 12.8 % (ref 11.7–14.9)
PLATELET # BLD: 217 K/CU MM (ref 140–440)
PMV BLD AUTO: 9.7 FL (ref 7.5–11.1)
POTASSIUM SERPL-SCNC: 3.7 MMOL/L (ref 3.5–5.1)
PRO-BNP: 19.86 PG/ML
PROTHROMBIN TIME: 10.7 SECONDS (ref 11.7–14.5)
RBC # BLD: 5.45 M/CU MM (ref 4.6–6.2)
SARS-COV-2, NAAT: NOT DETECTED
SEGMENTED NEUTROPHILS ABSOLUTE COUNT: 2.9 K/CU MM
SEGMENTED NEUTROPHILS RELATIVE PERCENT: 40.4 % (ref 36–66)
SODIUM BLD-SCNC: 143 MMOL/L (ref 135–145)
SOURCE: NORMAL
T4 FREE: 1.3 NG/DL (ref 0.9–1.8)
TOTAL IMMATURE NEUTOROPHIL: 0.01 K/CU MM
TOTAL PROTEIN: 7 GM/DL (ref 6.4–8.2)
TROPONIN T: <0.01 NG/ML
TROPONIN T: <0.01 NG/ML
TSH HIGH SENSITIVITY: 2.18 UIU/ML (ref 0.27–4.2)
WBC # BLD: 7.1 K/CU MM (ref 4–10.5)

## 2021-11-26 PROCEDURE — 2500000003 HC RX 250 WO HCPCS: Performed by: EMERGENCY MEDICINE

## 2021-11-26 PROCEDURE — 96375 TX/PRO/DX INJ NEW DRUG ADDON: CPT

## 2021-11-26 PROCEDURE — 6370000000 HC RX 637 (ALT 250 FOR IP): Performed by: EMERGENCY MEDICINE

## 2021-11-26 PROCEDURE — 93005 ELECTROCARDIOGRAM TRACING: CPT | Performed by: EMERGENCY MEDICINE

## 2021-11-26 PROCEDURE — 83880 ASSAY OF NATRIURETIC PEPTIDE: CPT

## 2021-11-26 PROCEDURE — 6360000002 HC RX W HCPCS

## 2021-11-26 PROCEDURE — 96365 THER/PROPH/DIAG IV INF INIT: CPT

## 2021-11-26 PROCEDURE — 85025 COMPLETE CBC W/AUTO DIFF WBC: CPT

## 2021-11-26 PROCEDURE — 84439 ASSAY OF FREE THYROXINE: CPT

## 2021-11-26 PROCEDURE — 85730 THROMBOPLASTIN TIME PARTIAL: CPT

## 2021-11-26 PROCEDURE — 99285 EMERGENCY DEPT VISIT HI MDM: CPT

## 2021-11-26 PROCEDURE — 93010 ELECTROCARDIOGRAM REPORT: CPT | Performed by: INTERNAL MEDICINE

## 2021-11-26 PROCEDURE — 85379 FIBRIN DEGRADATION QUANT: CPT

## 2021-11-26 PROCEDURE — 71045 X-RAY EXAM CHEST 1 VIEW: CPT

## 2021-11-26 PROCEDURE — 85610 PROTHROMBIN TIME: CPT

## 2021-11-26 PROCEDURE — 84484 ASSAY OF TROPONIN QUANT: CPT

## 2021-11-26 PROCEDURE — 87635 SARS-COV-2 COVID-19 AMP PRB: CPT

## 2021-11-26 PROCEDURE — 84443 ASSAY THYROID STIM HORMONE: CPT

## 2021-11-26 PROCEDURE — 96366 THER/PROPH/DIAG IV INF ADDON: CPT

## 2021-11-26 PROCEDURE — 80053 COMPREHEN METABOLIC PANEL: CPT

## 2021-11-26 PROCEDURE — 83735 ASSAY OF MAGNESIUM: CPT

## 2021-11-26 RX ORDER — ADENOSINE 3 MG/ML
INJECTION INTRAVENOUS
Status: DISCONTINUED
Start: 2021-11-26 | End: 2021-11-26 | Stop reason: HOSPADM

## 2021-11-26 RX ORDER — DILTIAZEM HYDROCHLORIDE 120 MG/1
120 CAPSULE, COATED, EXTENDED RELEASE ORAL DAILY
Qty: 30 CAPSULE | Refills: 0 | Status: SHIPPED | OUTPATIENT
Start: 2021-11-26 | End: 2021-11-26 | Stop reason: SDUPTHER

## 2021-11-26 RX ORDER — DILTIAZEM HYDROCHLORIDE 120 MG/1
120 CAPSULE, COATED, EXTENDED RELEASE ORAL DAILY
Qty: 30 CAPSULE | Refills: 0 | Status: SHIPPED | OUTPATIENT
Start: 2021-11-26 | End: 2022-05-03 | Stop reason: CLARIF

## 2021-11-26 RX ORDER — DILTIAZEM HYDROCHLORIDE 5 MG/ML
15 INJECTION INTRAVENOUS ONCE
Status: COMPLETED | OUTPATIENT
Start: 2021-11-26 | End: 2021-11-26

## 2021-11-26 RX ORDER — ADENOSINE 3 MG/ML
6 INJECTION, SOLUTION INTRAVENOUS ONCE
Status: COMPLETED | OUTPATIENT
Start: 2021-11-26 | End: 2021-11-26

## 2021-11-26 RX ORDER — ADENOSINE 3 MG/ML
INJECTION, SOLUTION INTRAVENOUS
Status: COMPLETED
Start: 2021-11-26 | End: 2021-11-26

## 2021-11-26 RX ORDER — ASPIRIN 81 MG/1
324 TABLET, CHEWABLE ORAL ONCE
Status: COMPLETED | OUTPATIENT
Start: 2021-11-26 | End: 2021-11-26

## 2021-11-26 RX ORDER — DILTIAZEM HYDROCHLORIDE 5 MG/ML
10 INJECTION INTRAVENOUS ONCE
Status: DISCONTINUED | OUTPATIENT
Start: 2021-11-26 | End: 2021-11-26 | Stop reason: HOSPADM

## 2021-11-26 RX ORDER — DILTIAZEM HYDROCHLORIDE 120 MG/1
120 CAPSULE, COATED, EXTENDED RELEASE ORAL DAILY
Status: DISCONTINUED | OUTPATIENT
Start: 2021-11-26 | End: 2021-11-26 | Stop reason: HOSPADM

## 2021-11-26 RX ADMIN — DILTIAZEM HYDROCHLORIDE 15 MG: 5 INJECTION, SOLUTION INTRAVENOUS at 01:00

## 2021-11-26 RX ADMIN — DILTIAZEM HYDROCHLORIDE 120 MG: 120 CAPSULE, COATED, EXTENDED RELEASE ORAL at 10:02

## 2021-11-26 RX ADMIN — ASPIRIN 81 MG CHEWABLE TABLET 324 MG: 81 TABLET CHEWABLE at 00:50

## 2021-11-26 RX ADMIN — APIXABAN 5 MG: 5 TABLET, FILM COATED ORAL at 12:15

## 2021-11-26 RX ADMIN — ADENOSINE 6 MG: 3 INJECTION INTRAVENOUS at 00:46

## 2021-11-26 RX ADMIN — DEXTROSE MONOHYDRATE 5 MG/HR: 50 INJECTION, SOLUTION INTRAVENOUS at 01:03

## 2021-11-26 RX ADMIN — ADENOSINE 6 MG: 3 INJECTION, SOLUTION INTRAVENOUS at 00:46

## 2021-11-26 ASSESSMENT — HEART SCORE: ECG: 0

## 2021-11-26 ASSESSMENT — PAIN SCALES - GENERAL: PAINLEVEL_OUTOF10: 5

## 2021-11-26 ASSESSMENT — PAIN DESCRIPTION - DESCRIPTORS: DESCRIPTORS: DISCOMFORT

## 2021-11-26 ASSESSMENT — PAIN DESCRIPTION - FREQUENCY: FREQUENCY: CONTINUOUS

## 2021-11-26 ASSESSMENT — PAIN DESCRIPTION - LOCATION: LOCATION: CHEST;THROAT

## 2021-11-26 ASSESSMENT — PAIN DESCRIPTION - ORIENTATION: ORIENTATION: MID

## 2021-11-26 ASSESSMENT — PAIN DESCRIPTION - PAIN TYPE: TYPE: ACUTE PAIN

## 2021-11-26 ASSESSMENT — PAIN DESCRIPTION - PROGRESSION: CLINICAL_PROGRESSION: GRADUALLY IMPROVING

## 2021-11-26 ASSESSMENT — PAIN DESCRIPTION - ONSET: ONSET: SUDDEN

## 2021-11-26 NOTE — ED PROVIDER NOTES
Emergency Department Encounter    Patient: Vanessa Bryant  MRN: [id number]  : 1950  Date of Evaluation: 2021  ED Provider:  Watson Simons MD    Critical Care: There is a high probability of clinically significant and life or limb altering change in the patient's condition that requires my immediate attention and intervention. Total critical care time not including separately reportable procedures is at least 30 minutes. Triage Chief Complaint:   Chest Pain      [place]:  Vanessa Bryatn is a 73 y.o. male that presents to the emergency department with chest pain and irregular heart rate. Patient reports that he woke at about 11 PM to go to the bathroom. He noticed midsternal chest discomfort and \"flutter\" in his chest at about the same time. Discomfort does not radiate or migrate. He denies any shortness of breath, sweats, or nausea. Symptoms have been essentially constant in timing since onset. He denies any specific provocative or alleviating factors. Patient denies known history of dysrhythmia, coronary disease, congestive heart failure, or venous thromboembolic disease. He is treated for mild hypertension and hypercholesterolemia. He denies diabetes or smoking. Patient denies frequent consumption of caffeine or pseudoephedrine. He does not acknowledge drinking \"a couple shots\" earlier this evening for the Thanksgiving holiday. He denies alcoholism. Patient has felt well otherwise. Patient reports no other particular provocative or alleviating factors. ROS - see HPI, below listed is current ROS at time of my eval:  CONSTITUTIONAL: No fevers, chills, or sweats. EYES: No vision change, redness, drainage, or discharge. HENT: No sore throat, runny nose, or earache. No dental pain. No painful swallowing. RESPIRATORY: No shortness of breath, cough, or sputum production. CARDIOVASCULAR: As above. GASTROINTESTINAL: No nausea, vomiting, or abdominal pain.   GENITOURINARY: No frequency, urgency, or dysuria. No hematuria. MUSCULOSKELETAL: No recent injury. No neck, back, or extremity pain. NEUROLOGICAL: No focal weakness, numbness, or tingling. SKIN: No rashes or other lesions reported. No yellowing of the skin. Medical history:  Past Medical History:   Diagnosis Date    Basal cell carcinoma of skin     RIGHT ANTERIOR SHOULDER    Benign prostatic hyperplasia     Chronic sinusitis     GERD (gastroesophageal reflux disease)     H/O seasonal allergies     Low back pain     Pancreatic abnormality     INCREASED PANCREATIC LIPASE    S/P discectomy for herniated nucleus pulposus     C6-C7     Past Surgical History:   Procedure Laterality Date    APPENDECTOMY      COLONOSCOPY  4/10/15    diverticulosis, colon polyps, internal hemorrhoids    KNEE SURGERY Bilateral     Scopes of both knees.  TONGUE SURGERY      Exploratory of lump under tongue. Normal tissue.     VASECTOMY  1995     Family History   Problem Relation Age of Onset    Emphysema Mother     Breast Cancer Mother     Other Father         agent orange issues    Pacemaker Father     Cancer Other         FEMALE FAMILY MEMBER - UTERINE    No Known Problems Sister     No Known Problems Brother     No Known Problems Brother      Social History     Socioeconomic History    Marital status:      Spouse name: Not on file    Number of children: Not on file    Years of education: Not on file    Highest education level: Not on file   Occupational History    Not on file   Tobacco Use    Smoking status: Former Smoker     Packs/day: 1.00     Years: 13.00     Pack years: 13.00     Types: Cigarettes     Start date: 1972     Quit date: 1985     Years since quittin.3    Smokeless tobacco: Never Used   Substance and Sexual Activity    Alcohol use: Yes     Comment: two mixed drinks 5 days a week    Drug use: No    Sexual activity: Yes     Partners: Female   Other Topics Concern    Not on file   Social History Narrative    Not on file     Social Determinants of Health     Financial Resource Strain: Low Risk     Difficulty of Paying Living Expenses: Not hard at all   Food Insecurity: No Food Insecurity    Worried About Running Out of Food in the Last Year: Never true    [address] in the Last Year: Never true   Transportation Needs:     Lack of Transportation (Medical): Not on file    Lack of Transportation (Non-Medical):  Not on file   Physical Activity:     Days of Exercise per Week: Not on file    Minutes of Exercise per Session: Not on file   Stress:     Feeling of Stress : Not on file   Social Connections:     Frequency of Communication with Friends and Family: Not on file    Frequency of Social Gatherings with Friends and Family: Not on file    Attends Jewish Services: Not on file    Active Member of [address] [place] [address] or Organizations: Not on file    Attends Club or Organization Meetings: Not on file    Marital Status: Not on file   Intimate Partner Violence:     Fear of Current or Ex-Partner: Not on file    Emotionally Abused: Not on file    Physically Abused: Not on file    Sexually Abused: Not on file   Housing Stability:     Unable to Pay for Housing in the Last Year: Not on file    Number of [place] in the Last Year: Not on file    Unstable Housing in the Last Year: Not on file     Current Facility-Administered Medications   Medication Dose Route Frequency Provider Last Rate Last Admin    adenosine (ADENOCARD) 12 MG/4ML injection             dilTIAZem 100 mg in dextrose 5 % 100 mL infusion (ADD-Mcallen)  5-15 mg/hr IntraVENous Continuous Lucia Wilson MD 15 mL/hr at 11/26/21 0131 15 mg/hr at 11/26/21 0131    dilTIAZem injection 10 mg  10 mg IntraVENous Once Lucia Wilson MD         Current Outpatient Medications   Medication Sig Dispense Refill    sildenafil (VIAGRA) 100 MG tablet Take 1 tablet by mouth daily as needed for Erectile Dysfunction 30 tablet 1    ezetimibe (ZETIA) 10 MG tablet Take 1 tablet by mouth daily 30 tablet 3    naproxen (NAPROSYN) 500 MG tablet Take 1 tablet by mouth 2 times daily as needed (joint pain) 180 tablet 1    omeprazole (PRILOSEC) 40 MG delayed release capsule Take 1 capsule by mouth daily 90 capsule 1    losartan (COZAAR) 50 MG tablet Take 1 tablet by mouth daily 90 tablet 1    Ascorbic Acid (VITAMIN C) 250 MG tablet Take 250 mg by mouth daily      Multiple Vitamins-Minerals (HAIR SKIN AND NAILS FORMULA PO) Take 1 tablet by mouth daily      fexofenadine (ALLEGRA) 180 MG tablet Take 180 mg by mouth as needed. Allergies   Allergen Reactions    Other      Chloraseptic- sores on tongue    Codeine Nausea And Vomiting    Pcn [Penicillins] Rash       Nursing Notes Reviewed    Physical Exam:  Triage VS:    ED Triage Vitals   Enc Vitals Group      BP       Pulse       Resp       Temp       Temp src       SpO2       Weight       Height       Head Circumference       Peak Flow       Pain Score       Pain Loc       Pain Edu? Excl. in [address]? My pulse ox interpretation is 95% on room air    GENERAL: Patient is awake, alert, and oriented appropriately. Patient is resting comfortably in a still position on the exam table. Patient speaking in full and complete sentences. Well-nourished and well-developed. HEENT: Normocephalic and atraumatic. Pupils equal, round, and reactive to light. No redness or matting. Bilateral external ears are unremarkable. Nasal mucosa is pink without purulence. Oral mucosa is moist and pink. NECK: Supple with normal range of motion. No Kernig's or Brudzinski sign. No JVD. RESPIRATORY: Symmetric aeration. No respiratory distress. No wheeze, stridor, rales, rhonchi. Chest wall stable and nontender. CARDIOVASCULAR: Tachycardia with slight irregularity by telemetry. No murmurs, rubs, or gallops. No central or peripheral cyanosis. GASTROINTESTINAL: Soft, nontender, and nondistended.   No changes. With the chest pain and heart score of 5, placing him in the medium risk cohort, we have discussed transfer to [facility] for further cardiac monitoring and evaluation. Patient and family are agreeable. Hospitalist Dr. Betsy Blanco is paged at [address] to discuss admission. Initial verbal orders are discussed at 0230. Decision time to admit/transfer: 0210. Patient seen during [facility], I did don appropriate PPE during my encounters with the patient, including n95 (when appropriate) mask and eye protection as appropriate.     I have reviewed and interpreted all of the currently available lab results from this visit (if applicable):  Results for orders placed or performed during the hospital encounter of 11/26/21   CBC Auto Differential   Result Value Ref Range    WBC 7.1 4.0 - 10.5 K/CU MM    RBC 5.45 4.6 - 6.2 M/CU MM    Hemoglobin 16.5 13.5 - 18.0 GM/DL    Hematocrit 49.4 42 - 52 %    MCV 90.6 78 - 100 FL    MCH 30.3 27 - 31 PG    MCHC 33.4 32.0 - 36.0 %    RDW 12.8 11.7 - 14.9 %    Platelets 068 843 - 412 K/CU MM    MPV 9.7 7.5 - 11.1 FL    Differential Type AUTOMATED DIFFERENTIAL     Segs Relative 40.4 36 - 66 %    Lymphocytes % 44.7 (H) 24 - 44 %    Monocytes % 11.0 (H) 0 - 4 %    Eosinophils % 3.0 0 - 3 %    Basophils % 0.8 0 - 1 %    Segs Absolute 2.9 K/CU MM    Lymphocytes Absolute 3.2 K/CU MM    Monocytes Absolute 0.8 K/CU MM    Eosinophils Absolute 0.2 K/CU MM    Basophils Absolute 0.1 K/CU MM    Immature Neutrophil % 0.1 0 - 0.43 %    Total Immature Neutrophil 0.01 K/CU MM   Comprehensive Metabolic Panel w/ Reflex to MG   Result Value Ref Range    Sodium 143 135 - 145 MMOL/L    Potassium 3.7 3.5 - 5.1 MMOL/L    Chloride 107 99 - 110 mMol/L    CO2 21 21 - 32 MMOL/L    BUN 18 6 - 23 MG/DL    CREATININE 0.8 (L) 0.9 - 1.3 MG/DL    Glucose 148 (H) 70 - 99 MG/DL    Calcium 8.8 8.3 - 10.6 MG/DL    Albumin 4.5 3.4 - 5.0 GM/DL    Total Protein 7.0 6.4 - 8.2 GM/DL    Total Bilirubin 0.2 0.0 - 1.0 MG/DL    ALT 22 10 - 40 U/L    AST 19 15 - 37 IU/L    Alkaline Phosphatase 79 40 - 129 IU/L    GFR Non-African American >60 >60 mL/min/1.73m2    GFR African American >60 >60 mL/min/1.73m2    Anion Gap 15 4 - 16   Troponin   Result Value Ref Range    Troponin T <0.010 <0.01 NG/ML   Brain Natriuretic Peptide   Result Value Ref Range    Pro-BNP 19.86 <300 PG/ML   Protime-INR   Result Value Ref Range    Protime 10.7 (L) 11.7 - 14.5 SECONDS    INR 0.86 INDEX   APTT   Result Value Ref Range    aPTT 29.9 25.1 - 37.1 SECONDS   Magnesium   Result Value Ref Range    Magnesium 2.3 1.8 - 2.4 mg/dl   TSH without Reflex   Result Value Ref Range    TSH, High Sensitivity 2.180 0.270 - 4.20 uIu/ml   D-Dimer, Quantitative   Result Value Ref Range    D-Dimer, Quant <200 <230 NG/mL(DDU)   EKG 12 Lead   Result Value Ref Range    Ventricular Rate 145 BPM    Atrial Rate 290 BPM    QRS Duration 82 ms    Q-T Interval 296 ms    QTc Calculation (Bazett) 459 ms    P Axis 250 degrees    R Axis -30 degrees    T Axis -29 degrees    Diagnosis       Atrial flutter with 2:1 AV conduction  Left axis deviation  Inferior infarct , age undetermined  Abnormal ECG  No previous ECGs available     EKG 12 Lead   Result Value Ref Range    Ventricular Rate 100 BPM    Atrial Rate 100 BPM    P-R Interval 164 ms    QRS Duration 82 ms    Q-T Interval 350 ms    QTc Calculation (Bazett) 451 ms    P Axis 25 degrees    R Axis -42 degrees    T Axis 0 degrees    Diagnosis       Normal sinus rhythm  Left axis deviation  Abnormal ECG  When compared with ECG of 26-NOV-2021 00:25,  Sinus rhythm has replaced Atrial flutter  Criteria for Inferior infarct are no longer present  ST no longer depressed in Anterolateral leads          Radiographs (if obtained):  Radiologist's Report Reviewed:  XR CHEST PORTABLE    Result Date: 11/26/2021  EXAMINATION: ONE XRAY VIEW OF THE CHEST 11/26/2021 12:59 am COMPARISON: Chest radiograph September 30, 2019 HISTORY: ORDERING SYSTEM PROVIDED HISTORY: Chest pain, tachycardia TECHNOLOGIST PROVIDED HISTORY: Reason for exam:->Chest pain, tachycardia Acuity: Acute Type of Exam: Initial Additional signs and symptoms: Chest pain, tachycardia, hx GERD, no sx FINDINGS: The cardiomediastinal silhouette is stable. Pulmonary vascular congestion is noted. There is no focal consolidation, effusion, or pneumothorax. Pulmonary vascular congestion. No focal consolidation. EKG:  Twelve-lead EKG #1 obtained at [facility] on 26 November 2021 and interpreted by me in the absence of a cardiologist.  There is no criteria ST elevation or reciprocal change. There are no hyperacute T wave changes. There is no sign of acute ischemia or infarction. This tracing shows atrial flutter with 2: 1 AV conduction. Rate and intervals are 143 beats per minute, MT interval indeterminant milliseconds, QRS duration 84 milliseconds, QTc interval 456 milliseconds, and R axis left shifted at -30 degrees. There are no old EKGs available for comparison. Twelve-lead EKG #2 obtained at 021 on 26 November 2021 and interpreted by me in the absence of a cardiologist.  There is no criteria ST elevation or reciprocal change. There are no hyperacute T wave changes. There is no sign of acute ischemia or infarction. This tracing shows a normal sinus rhythm. Rate and intervals are 100 beats per minute, MT interval 164 milliseconds, QRS duration 82 milliseconds, QTc interval 451 milliseconds, and R axis left shifted at -42 degrees. Telemetry:  10-second telemetry strip after adenosine shows irregularly irregular rhythm with flutter waves. No inappropriate ST elevation. Additional data:  Holter monitor obtained within this hospital network December 2011 indicates:  \"1. Predominant sinus rhythm. 2.  No significant bradyarrhythmia or tachyarrhythmia noted. \"     Medical decision making:  Patient presents to the emergency department with midsternal chest pain associated with new onset atrial flutter with rapid ventricular response. Heart rate gradually improved with diltiazem and ultimately converted to a sinus rhythm, and he remains in sinus as of this dictation at [phone]. First troponin and serial EKG show no detectable ischemia or infarction. Chest x-ray is suggestive of some vascular congestion, but lung sounds and BNP do not strongly suggest fulminant pulmonary edema and symptomatic congestive heart failure. Patient appears sufficiently low risk for venous thromboembolic disease currently. Abdomen is nonsurgical.  With this new onset of atrial flutter and heart score of 5, patient will be transferred for additional cardiology evaluation and treatment. Procedures: None. Consultations: Hospitalist service. Clinical Impression:  1. New onset atrial flutter ([address]    2. Atrial flutter with rapid ventricular response (HCC)    3. Chest pain, unspecified type    4. Essential hypertension    5. Hyperlipidemia, unspecified hyperlipidemia type      Disposition referral (if applicable):  No follow-up provider specified. Disposition medications (if applicable):  New Prescriptions    No medications on file     ED Provider Disposition Time  DISPOSITION Decision To Transfer 11/26/2021 02:09:39 AM      Comment: Please note this report has been produced using speech recognition software and may contain errors related to that system including errors in grammar, punctuation, and spelling, as well as words and phrases that may be inappropriate. Efforts were made to edit the dictations.         Calixto Alfaro MD  11/26/21 9143

## 2021-11-26 NOTE — ED PROVIDER NOTES
Gabi Hernandez was checked out to me by Dr. Rubina Barker. Please see his/her initial documentation for details of the patient's ED presentation, physical exam and completed studies.     In brief, Gabi Hernandez is a 73 y.o. male that presents with chest pain and flutter sensation since approximately 11 PM.    Labs  Results for orders placed or performed during the hospital encounter of 11/26/21   COVID-19, Rapid    Specimen: Nasopharyngeal   Result Value Ref Range    Source THROAT     SARS-CoV-2, NAAT NOT DETECTED NOT DETECTED   CBC Auto Differential   Result Value Ref Range    WBC 7.1 4.0 - 10.5 K/CU MM    RBC 5.45 4.6 - 6.2 M/CU MM    Hemoglobin 16.5 13.5 - 18.0 GM/DL    Hematocrit 49.4 42 - 52 %    MCV 90.6 78 - 100 FL    MCH 30.3 27 - 31 PG    MCHC 33.4 32.0 - 36.0 %    RDW 12.8 11.7 - 14.9 %    Platelets 372 838 - 374 K/CU MM    MPV 9.7 7.5 - 11.1 FL    Differential Type AUTOMATED DIFFERENTIAL     Segs Relative 40.4 36 - 66 %    Lymphocytes % 44.7 (H) 24 - 44 %    Monocytes % 11.0 (H) 0 - 4 %    Eosinophils % 3.0 0 - 3 %    Basophils % 0.8 0 - 1 %    Segs Absolute 2.9 K/CU MM    Lymphocytes Absolute 3.2 K/CU MM    Monocytes Absolute 0.8 K/CU MM    Eosinophils Absolute 0.2 K/CU MM    Basophils Absolute 0.1 K/CU MM    Immature Neutrophil % 0.1 0 - 0.43 %    Total Immature Neutrophil 0.01 K/CU MM   Comprehensive Metabolic Panel w/ Reflex to MG   Result Value Ref Range    Sodium 143 135 - 145 MMOL/L    Potassium 3.7 3.5 - 5.1 MMOL/L    Chloride 107 99 - 110 mMol/L    CO2 21 21 - 32 MMOL/L    BUN 18 6 - 23 MG/DL    CREATININE 0.8 (L) 0.9 - 1.3 MG/DL    Glucose 148 (H) 70 - 99 MG/DL    Calcium 8.8 8.3 - 10.6 MG/DL    Albumin 4.5 3.4 - 5.0 GM/DL    Total Protein 7.0 6.4 - 8.2 GM/DL    Total Bilirubin 0.2 0.0 - 1.0 MG/DL    ALT 22 10 - 40 U/L    AST 19 15 - 37 IU/L    Alkaline Phosphatase 79 40 - 129 IU/L    GFR Non-African American >60 >60 mL/min/1.73m2    GFR African American >60 >60 mL/min/1.73m2    Anion Gap 15 4 - 16   Troponin   Result Value Ref Range    Troponin T <0.010 <0.01 NG/ML   Brain Natriuretic Peptide   Result Value Ref Range    Pro-BNP 19.86 <300 PG/ML   Protime-INR   Result Value Ref Range    Protime 10.7 (L) 11.7 - 14.5 SECONDS    INR 0.86 INDEX   APTT   Result Value Ref Range    aPTT 29.9 25.1 - 37.1 SECONDS   Magnesium   Result Value Ref Range    Magnesium 2.3 1.8 - 2.4 mg/dl   TSH without Reflex   Result Value Ref Range    TSH, High Sensitivity 2.180 0.270 - 4.20 uIu/ml   T4, free   Result Value Ref Range    T4 Free 1.30 0.9 - 1.8 NG/DL   D-Dimer, Quantitative   Result Value Ref Range    D-Dimer, Quant <200 <230 NG/mL(DDU)   EKG 12 Lead   Result Value Ref Range    Ventricular Rate 145 BPM    Atrial Rate 290 BPM    QRS Duration 82 ms    Q-T Interval 296 ms    QTc Calculation (Bazett) 459 ms    P Axis 250 degrees    R Axis -30 degrees    T Axis -29 degrees    Diagnosis       Atrial flutter with 2:1 AV conduction  Left axis deviation  Inferior infarct , age undetermined  Abnormal ECG  No previous ECGs available     EKG 12 Lead   Result Value Ref Range    Ventricular Rate 100 BPM    Atrial Rate 100 BPM    P-R Interval 164 ms    QRS Duration 82 ms    Q-T Interval 350 ms    QTc Calculation (Bazett) 451 ms    P Axis 25 degrees    R Axis -42 degrees    T Axis 0 degrees    Diagnosis       Normal sinus rhythm  Left axis deviation  Abnormal ECG  When compared with ECG of 26-NOV-2021 00:25,  Sinus rhythm has replaced Atrial flutter  Criteria for Inferior infarct are no longer present  ST no longer depressed in Anterolateral leads         MDM:  Patient endorsed to me pending likely transfer to [facility]. There is no bed available at this time. Patient has been converted from his atrial flutter with RVR to a normal sinus rhythm confirmed on repeat EKG.   Patient's Cardizem drip has now titrated to 2-1/2 mg an hour and I am loading him with Cardizem ER orally with plan to eventually transition to oral tablets. Patient and wife who is a former ER nurse are requesting potential outpatient evaluation for the above which is not unreasonable. Consult to cardiology is placed to discuss the above. Repeat troponin is also ordered at this time. Repeat troponin is negative. I did discuss the case with our on-call cardiology team.  I spoke with Dr. Yeh More and given the fact that patient has converted and he is asymptomatic with reassuring repeat testing he is amenable to patient having following up as an outpatient in the short-term. He is able to see the patient on Monday. He does recommend starting patient on Eliquis and agrees with starting patient on Cardizem extended release capsule 120 mg daily. I did encourage patient to monitor symptoms closely today and over the weekend and return to the hospital at any time should things return or worsen and they are amenable to this plan. They are planning on calling cardiology office today to schedule the appointment for upcoming Monday. Patient discharged. Final Impression:  1. New onset atrial flutter ([address]    2. Atrial flutter with rapid ventricular response (HCC)    3. Chest pain, unspecified type    4. Essential hypertension    5. Hyperlipidemia, unspecified hyperlipidemia type          Please note that portions of this note may have been complete with a voice recognition program.  Efforts were made to edit the dictations, but occasional words are mis-transcribed.          Anay Tsai MD  11/26/21 4819

## 2021-11-26 NOTE — ED NOTES
Awoke about 11pm and went to the BR started feeling fluttering to upper chest and throat with discomfort to chest. Checked B/P and HR and they were both high.      Eleuterio Man RN  11/26/21 3422

## 2021-11-29 ENCOUNTER — OFFICE VISIT (OUTPATIENT)
Dept: CARDIOLOGY CLINIC | Age: 71
End: 2021-11-29
Payer: MEDICARE

## 2021-11-29 ENCOUNTER — PROCEDURE VISIT (OUTPATIENT)
Dept: CARDIOLOGY CLINIC | Age: 71
End: 2021-11-29
Payer: MEDICARE

## 2021-11-29 VITALS
HEIGHT: 69 IN | BODY MASS INDEX: 36.35 KG/M2 | SYSTOLIC BLOOD PRESSURE: 148 MMHG | OXYGEN SATURATION: 98 % | DIASTOLIC BLOOD PRESSURE: 90 MMHG | HEART RATE: 81 BPM | WEIGHT: 245.4 LBS

## 2021-11-29 DIAGNOSIS — R94.31 ABNORMAL EKG: ICD-10-CM

## 2021-11-29 DIAGNOSIS — R07.2 PRECORDIAL CHEST PAIN: ICD-10-CM

## 2021-11-29 DIAGNOSIS — I48.92 ATRIAL FLUTTER BY ELECTROCARDIOGRAM (HCC): ICD-10-CM

## 2021-11-29 DIAGNOSIS — I10 ESSENTIAL HYPERTENSION: Primary | ICD-10-CM

## 2021-11-29 DIAGNOSIS — I10 ESSENTIAL HYPERTENSION: ICD-10-CM

## 2021-11-29 LAB
LV EF: 58 %
LVEF MODALITY: NORMAL

## 2021-11-29 PROCEDURE — 93306 TTE W/DOPPLER COMPLETE: CPT | Performed by: INTERNAL MEDICINE

## 2021-11-29 PROCEDURE — G8417 CALC BMI ABV UP PARAM F/U: HCPCS | Performed by: INTERNAL MEDICINE

## 2021-11-29 PROCEDURE — 1111F DSCHRG MED/CURRENT MED MERGE: CPT | Performed by: INTERNAL MEDICINE

## 2021-11-29 PROCEDURE — 1123F ACP DISCUSS/DSCN MKR DOCD: CPT | Performed by: INTERNAL MEDICINE

## 2021-11-29 PROCEDURE — 4040F PNEUMOC VAC/ADMIN/RCVD: CPT | Performed by: INTERNAL MEDICINE

## 2021-11-29 PROCEDURE — G8427 DOCREV CUR MEDS BY ELIG CLIN: HCPCS | Performed by: INTERNAL MEDICINE

## 2021-11-29 PROCEDURE — G8484 FLU IMMUNIZE NO ADMIN: HCPCS | Performed by: INTERNAL MEDICINE

## 2021-11-29 PROCEDURE — 1036F TOBACCO NON-USER: CPT | Performed by: INTERNAL MEDICINE

## 2021-11-29 PROCEDURE — 3017F COLORECTAL CA SCREEN DOC REV: CPT | Performed by: INTERNAL MEDICINE

## 2021-11-29 PROCEDURE — 99204 OFFICE O/P NEW MOD 45 MIN: CPT | Performed by: INTERNAL MEDICINE

## 2021-11-29 NOTE — PROGRESS NOTES
CARDIOLOGY NOTE      11/29/2021    RE: Mariela Thao  (1950)                               TO:  Rae Gupta is a 73 y.o. male who was seen today for management of  A flutter                                    HPI:                   Pt has h/o a flutter, HTN, hyperlipidimea seen today for  Fu from ED was in with a flutter.  Pt also had  Precordial cp as well , in ED , trops were normal at present painfree    Mariela Thao has the following history recorded in care path:  Patient Active Problem List    Diagnosis Date Noted    Atrial flutter by electrocardiogram ([facility] [address] 11/26/2021    Other hyperlipidemia 03/11/2021    Non-seasonal allergic rhinitis due to pollen 03/11/2021    Primary osteoarthritis of both knees 03/11/2021    Other male erectile dysfunction 03/11/2021    Morbidly obese ([facility] [address] 09/11/2020    Essential hypertension 09/13/2019    GERD (gastroesophageal reflux disease)     Low back pain     Basal cell carcinoma of skin     Chronic sinusitis     S/P discectomy for herniated nucleus pulposus     Benign prostatic hyperplasia      Current Outpatient Medications   Medication Sig Dispense Refill    apixaban (ELIQUIS) 5 MG TABS tablet Take 1 tablet by mouth 2 times daily 60 tablet 0    dilTIAZem (CARDIZEM CD) 120 MG extended release capsule Take 1 capsule by mouth daily 30 capsule 0    sildenafil (VIAGRA) 100 MG tablet Take 1 tablet by mouth daily as needed for Erectile Dysfunction 30 tablet 1    ezetimibe (ZETIA) 10 MG tablet Take 1 tablet by mouth daily 30 tablet 3    naproxen (NAPROSYN) 500 MG tablet Take 1 tablet by mouth 2 times daily as needed (joint pain) 180 tablet 1    omeprazole (PRILOSEC) 40 MG delayed release capsule Take 1 capsule by mouth daily 90 capsule 1    losartan (COZAAR) 50 MG tablet Take 1 tablet by mouth daily 90 tablet 1    Ascorbic Acid (VITAMIN C) 250 MG tablet Take 250 mg by mouth daily  Multiple Vitamins-Minerals (HAIR SKIN AND NAILS FORMULA PO) Take 1 tablet by mouth daily      fexofenadine (ALLEGRA) 180 MG tablet Take 180 mg by mouth as needed. No current facility-administered medications for this visit. Allergies: Other, Codeine, and Pcn [penicillins]  Past Medical History:   Diagnosis Date    Basal cell carcinoma of skin     RIGHT ANTERIOR SHOULDER    Benign prostatic hyperplasia     Chronic sinusitis     GERD (gastroesophageal reflux disease)     H/O seasonal allergies     Low back pain     Pancreatic abnormality     INCREASED PANCREATIC LIPASE    S/P discectomy for herniated nucleus pulposus     C6-C7     Past Surgical History:   Procedure Laterality Date    APPENDECTOMY      COLONOSCOPY  4/10/15    diverticulosis, colon polyps, internal hemorrhoids    KNEE SURGERY Bilateral     Scopes of both knees.  TONGUE SURGERY      Exploratory of lump under tongue. Normal tissue.  VASECTOMY  1995      As reviewed   Family History   Problem Relation Age of Onset    Emphysema Mother     Breast Cancer Mother     Other Father         agent orange issues    Pacemaker Father     Cancer Other         FEMALE FAMILY MEMBER - UTERINE    No Known Problems Sister     No Known Problems Brother     No Known Problems Brother      Social History     Tobacco Use    Smoking status: Former Smoker     Packs/day: 1.00     Years: 13.00     Pack years: 13.00     Types: Cigarettes     Start date: 1972     Quit date: 1985     Years since quittin.3    Smokeless tobacco: Never Used   Substance Use Topics    Alcohol use: Yes     Comment: two mixed drinks 5 days a week      Review of Systems:    Constitutional: Negative for diaphoresis and fatigue  Psychological:Negative for anxiety or depression  HENT: Negative for headaches, nasal congestion, sinus pain or vertigo  Eyes: Negative for visual disturbance.    Endocrine: Negative for polydipsia/polyuria  Respiratory: Negative for shortness of breath  Cardiovascular: Negative for chest pain, dyspnea on exertion, claudication, edema, irregular heartbeat, murmur, palpitations or shortness of breath  Gastrointestinal: Negative for abdominal pain or heartburn  Genito-Urinary: Negative for urinary frequency/urgency  Musculoskeletal: Negative for muscle pain, muscular weakness, negative for pain in arm and leg or swelling in foot and leg  Neurological: Negative for dizziness, headaches, memory loss, numbness/tingling, visual changes, syncope  Dermatological: Negative for rash    Objective:    Vitals:    11/29/21 0938   BP: (!) 148/90   Pulse: 81   SpO2: 98%   Weight: 245 lb 6.4 oz (111.3 kg)   Height: 5' 9\" (1.753 m)     BP (!) 148/90   Pulse 81   Ht 5' 9\" (1.753 m)   Wt 245 lb 6.4 oz (111.3 kg)   SpO2 98%   BMI 36.24 kg/m²     Patient-Reported Vitals 3/11/2021   Patient-Reported Weight 239lb   Patient-Reported Height 70\"   Patient-Reported Systolic 635   Patient-Reported Diastolic 82        Wt Readings from Last 3 Encounters:   11/29/21 245 lb 6.4 oz (111.3 kg)   11/26/21 245 lb (111.1 kg)   09/27/21 247 lb (112 kg)     Body mass index is 36.24 kg/m². GENERAL - Alert, oriented, pleasant, in no apparent distress. EYES: No jaundice, no conjunctival pallor. SKIN: It is warm & dry. No rashes. No Echhymosis    HEENT  No clinically significant abnormalities seen. Neck - Supple. No jugular venous distention noted. No carotid bruits. Cardiovascular  Normal S1 and S2 without obvious murmur or gallop. Extremities - No cyanosis, clubbing, or significant edema. Pulmonary  No respiratory distress. No wheezes or rales. Abdomen  No masses, tenderness, or organomegaly. Musculoskeletal  No significant edema. No joint deformities. No muscle wasting. Neurologic  Cranial nerves II through XII are grossly intact. There were no gross focal neurologic abnormalities.     Lab Review   Lab Results   Component Value Date TROPONINT <0.010 11/26/2021     BNP:  No results found for: BNP  PT/INR:    Lab Results   Component Value Date    INR 0.86 11/26/2021     Lab Results   Component Value Date    LABA1C 5.6 12/11/2020     Lab Results   Component Value Date    WBC 7.1 11/26/2021    HCT 49.4 11/26/2021    MCV 90.6 11/26/2021     11/26/2021     Lab Results   Component Value Date    CHOL 164 10/01/2021    TRIG 53 10/01/2021    HDL 73 (H) 10/01/2021    LDLCALC 80 10/01/2021    LDLDIRECT 115 (H) 09/17/2020     Lab Results   Component Value Date    ALT 22 11/26/2021    AST 19 11/26/2021     BMP:    Lab Results   Component Value Date     11/26/2021    K 3.7 11/26/2021     11/26/2021    CO2 21 11/26/2021    BUN 18 11/26/2021    CREATININE 0.8 11/26/2021     CMP:   Lab Results   Component Value Date     11/26/2021    K 3.7 11/26/2021     11/26/2021    CO2 21 11/26/2021    BUN 18 11/26/2021    PROT 7.0 11/26/2021     TSH:    Lab Results   Component Value Date    TSHHS 2.180 11/26/2021           Assessment & Plan:    - pt had CP as well was in  [facility] ED    - a futter vs SVT:  In SR now   ? Typical  Type 1 a flutter  Was in ED with a flutter  Stress cardiolite and echo  ? ablation    - risk factor modification    -  Hypertension: Patients blood pressure is normal. Patient is advised about low sodium diet. Present medical regimen will not be changed. On losratan      -  LIPID MANAGEMENT:  Importance of lipid levels discussed with patient   and patient was given dietary advice. NCEP- ATP III guidelines reviewed with patient. -   Changes  in medicines made:  No                       On abraham Roberson MD    [facility]

## 2021-11-29 NOTE — PATIENT INSTRUCTIONS
**It is YOUR responsibilty to bring medication bottles and/or updated medication list to [address] [place] [address]. This will allow us to better serve you and all your healthcare needs**    Please be informed that if you contact our office outside of normal business hours the physician on call cannot help with any scheduling or rescheduling issues, procedure instruction questions or any type of medication issue. We advise you for any urgent/emergency that you go to the nearest emergency room!     PLEASE CALL OUR OFFICE DURING NORMAL BUSINESS HOURS    Monday - Friday   8 am to 5 pm    [place]: [address]: [phone]    [place]:  [phone]

## 2021-12-03 ENCOUNTER — TELEPHONE (OUTPATIENT)
Dept: CARDIOLOGY CLINIC | Age: 71
End: 2021-12-03

## 2021-12-03 ENCOUNTER — PROCEDURE VISIT (OUTPATIENT)
Dept: CARDIOLOGY CLINIC | Age: 71
End: 2021-12-03
Payer: MEDICARE

## 2021-12-03 ENCOUNTER — INITIAL CONSULT (OUTPATIENT)
Dept: CARDIOLOGY CLINIC | Age: 71
End: 2021-12-03
Payer: MEDICARE

## 2021-12-03 VITALS
HEART RATE: 84 BPM | SYSTOLIC BLOOD PRESSURE: 134 MMHG | BODY MASS INDEX: 36.05 KG/M2 | DIASTOLIC BLOOD PRESSURE: 80 MMHG | HEIGHT: 69 IN | WEIGHT: 243.4 LBS

## 2021-12-03 DIAGNOSIS — I48.92 ATRIAL FLUTTER BY ELECTROCARDIOGRAM (HCC): ICD-10-CM

## 2021-12-03 DIAGNOSIS — I48.92 ATRIAL FLUTTER, UNSPECIFIED TYPE (HCC): Primary | ICD-10-CM

## 2021-12-03 DIAGNOSIS — R07.2 PRECORDIAL CHEST PAIN: ICD-10-CM

## 2021-12-03 DIAGNOSIS — I10 ESSENTIAL HYPERTENSION: ICD-10-CM

## 2021-12-03 DIAGNOSIS — R94.31 ABNORMAL EKG: ICD-10-CM

## 2021-12-03 LAB
LV EF: 60 %
LVEF MODALITY: NORMAL

## 2021-12-03 PROCEDURE — 1036F TOBACCO NON-USER: CPT | Performed by: INTERNAL MEDICINE

## 2021-12-03 PROCEDURE — 93017 CV STRESS TEST TRACING ONLY: CPT | Performed by: INTERNAL MEDICINE

## 2021-12-03 PROCEDURE — 4040F PNEUMOC VAC/ADMIN/RCVD: CPT | Performed by: INTERNAL MEDICINE

## 2021-12-03 PROCEDURE — 99204 OFFICE O/P NEW MOD 45 MIN: CPT | Performed by: INTERNAL MEDICINE

## 2021-12-03 PROCEDURE — G8417 CALC BMI ABV UP PARAM F/U: HCPCS | Performed by: INTERNAL MEDICINE

## 2021-12-03 PROCEDURE — A9500 TC99M SESTAMIBI: HCPCS | Performed by: INTERNAL MEDICINE

## 2021-12-03 PROCEDURE — 93016 CV STRESS TEST SUPVJ ONLY: CPT | Performed by: INTERNAL MEDICINE

## 2021-12-03 PROCEDURE — G8427 DOCREV CUR MEDS BY ELIG CLIN: HCPCS | Performed by: INTERNAL MEDICINE

## 2021-12-03 PROCEDURE — 3017F COLORECTAL CA SCREEN DOC REV: CPT | Performed by: INTERNAL MEDICINE

## 2021-12-03 PROCEDURE — 93018 CV STRESS TEST I&R ONLY: CPT | Performed by: INTERNAL MEDICINE

## 2021-12-03 PROCEDURE — 1123F ACP DISCUSS/DSCN MKR DOCD: CPT | Performed by: INTERNAL MEDICINE

## 2021-12-03 PROCEDURE — G8484 FLU IMMUNIZE NO ADMIN: HCPCS | Performed by: INTERNAL MEDICINE

## 2021-12-03 PROCEDURE — 78452 HT MUSCLE IMAGE SPECT MULT: CPT | Performed by: INTERNAL MEDICINE

## 2021-12-03 PROCEDURE — 93000 ELECTROCARDIOGRAM COMPLETE: CPT | Performed by: INTERNAL MEDICINE

## 2021-12-03 NOTE — PATIENT INSTRUCTIONS
COVID test, pre-testing, and sign consent form on 12/15/21 at the [facility]. Procedure scheduled with Aleah Cline on 12/21/21 at [facility].      Please be informed that if you contact our office outside of normal business hours the physician on call cannot help with any scheduling or rescheduling issues, procedure instruction questions or any type of medication issue. We advise you for any urgent/emergency that you go to the nearest emergency room! PLEASE CALL OUR OFFICE DURING NORMAL BUSINESS HOURS    Monday - Friday   8 am to 5 pm    [facility] [address]: [phone]    [place]:  [phone]    **It is YOUR responsibilty to bring medication bottles and/or updated medication list to [address] [place] [address].  This will allow us to better serve you and all your healthcare needs**

## 2021-12-03 NOTE — PROGRESS NOTES
Electrophysiology Consult Note      Reason for consultation:  Atrial flutter    Chief complaint :  Palpitations    Referring physician: Dr. Lia Coombs      Primary care physician: VIN Lemon CNP      History of Present Illness:     Patient is 70-year-old male with a history of hypertension, hyperlipidemia referred by Dr. Lia Coombs for atrial flutter. Patient reports that recently he went to the emergency room with chest pain and palpitations and was noted to be in atrial flutter. Patient was referred to me for further management. Patient reports he does not feel any symptoms now but when he had the arrhythmia he was very symptomatic. Patient denies drinking alcohol or smoking. Patient reports drinking coffee every morning. Patient does exercise      Pastmedical history:   Past Medical History:   Diagnosis Date    Basal cell carcinoma of skin     RIGHT ANTERIOR SHOULDER    Benign prostatic hyperplasia     Chronic sinusitis     GERD (gastroesophageal reflux disease)     H/O seasonal allergies     Low back pain     Pancreatic abnormality     INCREASED PANCREATIC LIPASE    S/P discectomy for herniated nucleus pulposus     C6-C7       Surgical history :   Past Surgical History:   Procedure Laterality Date    APPENDECTOMY      COLONOSCOPY  4/10/15    diverticulosis, colon polyps, internal hemorrhoids    KNEE SURGERY Bilateral     Scopes of both knees.  TONGUE SURGERY      Exploratory of lump under tongue. Normal tissue.  VASECTOMY  05/11/1995       Family history:   Family History   Problem Relation Age of Onset    Emphysema Mother     Breast Cancer Mother     Other Father         agent orange issues    Pacemaker Father     Cancer Other         FEMALE FAMILY MEMBER - UTERINE    No Known Problems Sister     No Known Problems Brother     No Known Problems Brother        Social history :  reports that he quit smoking about 36 years ago.  His smoking use included cigarettes. He started smoking about 49 years ago. He has a 13.00 pack-year smoking history. He has never used smokeless tobacco. He reports current alcohol use. He reports that he does not use drugs. Allergies   Allergen Reactions    Other      Chloraseptic- sores on tongue    Codeine Nausea And Vomiting    Pcn [Penicillins] Rash       Current Outpatient Medications on File Prior to Visit   Medication Sig Dispense Refill    apixaban (ELIQUIS) 5 MG TABS tablet Take 1 tablet by mouth 2 times daily 28 tablet 0    dilTIAZem (CARDIZEM CD) 120 MG extended release capsule Take 1 capsule by mouth daily 30 capsule 0    sildenafil (VIAGRA) 100 MG tablet Take 1 tablet by mouth daily as needed for Erectile Dysfunction 30 tablet 1    ezetimibe (ZETIA) 10 MG tablet Take 1 tablet by mouth daily 30 tablet 3    naproxen (NAPROSYN) 500 MG tablet Take 1 tablet by mouth 2 times daily as needed (joint pain) 180 tablet 1    omeprazole (PRILOSEC) 40 MG delayed release capsule Take 1 capsule by mouth daily 90 capsule 1    losartan (COZAAR) 50 MG tablet Take 1 tablet by mouth daily 90 tablet 1    Ascorbic Acid (VITAMIN C) 250 MG tablet Take 250 mg by mouth daily      Multiple Vitamins-Minerals (HAIR SKIN AND NAILS FORMULA PO) Take 1 tablet by mouth daily      fexofenadine (ALLEGRA) 180 MG tablet Take 180 mg by mouth as needed. No current facility-administered medications on file prior to visit. Review of Systems:   Review of Systems   Constitutional: Positive for fatigue. Negative for activity change, chills and fever. HENT: Negative for congestion, ear pain and tinnitus. Eyes: Negative for photophobia, pain and visual disturbance. Respiratory: Negative for cough, chest tightness, shortness of breath and wheezing. Cardiovascular: Positive for chest pain and palpitations. Negative for leg swelling.    Gastrointestinal: Negative for abdominal pain, blood in stool, constipation, diarrhea, nausea and vomiting. Endocrine: Negative for cold intolerance and heat intolerance. Genitourinary: Negative for dysuria, flank pain and hematuria. Musculoskeletal: Positive for arthralgias. Negative for back pain, myalgias and neck stiffness. Skin: Negative for color change and rash. Allergic/Immunologic: Negative for food allergies. Neurological: Negative for dizziness, light-headedness, numbness and headaches. Hematological: Does not bruise/bleed easily. Psychiatric/Behavioral: Negative for agitation, behavioral problems and confusion. Examination:      Vitals:    12/03/21 1344   BP: 134/80   Pulse: 84   Weight: 243 lb 6.4 oz (110.4 kg)   Height: 5' 9\" (1.753 m)        Body mass index is 35.94 kg/m². Physical Exam  Constitutional:       Appearance: Normal appearance. He is not ill-appearing. HENT:      Head: Normocephalic and atraumatic. Mouth/Throat:      Mouth: Mucous membranes are moist.   Eyes:      Conjunctiva/sclera: Conjunctivae normal.   Cardiovascular:      Rate and Rhythm: Normal rate and regular rhythm. Heart sounds: No murmur heard. Pulmonary:      Effort: Pulmonary effort is normal.      Breath sounds: No rales. Abdominal:      General: Abdomen is flat. Palpations: Abdomen is soft. Musculoskeletal:         General: No tenderness. Normal range of motion. Cervical back: Normal range of motion. Right lower leg: No edema. Left lower leg: No edema. Skin:     General: Skin is warm and dry. Neurological:      General: No focal deficit present. Mental Status: He is alert and oriented to person, place, and time.            CBC:   Lab Results   Component Value Date    WBC 7.1 11/26/2021    HGB 16.5 11/26/2021    HCT 49.4 11/26/2021     11/26/2021     Lipids:  Lab Results   Component Value Date    CHOL 164 10/01/2021    TRIG 53 10/01/2021    HDL 73 (H) 10/01/2021    LDLCALC 80 10/01/2021    LDLDIRECT 115 (H) 09/17/2020 PT/INR:   Lab Results   Component Value Date    INR 0.86 11/26/2021        BMP:    Lab Results   Component Value Date     11/26/2021    K 3.7 11/26/2021     11/26/2021    CO2 21 11/26/2021    BUN 18 11/26/2021     CMP:   Lab Results   Component Value Date    AST 19 11/26/2021    PROT 7.0 11/26/2021    BILITOT 0.2 11/26/2021    ALKPHOS 79 11/26/2021     TSH:  No results found for: TSH    EKGINTERPRETATION - EKG Interpretation:  Sinus rhythm       IMPRESSION / RECOMMENDATIONS:     Atrial flutter paroxysmal  HTN  HLD  Obesity BMI 36    Patient had appears to have atrial flutter with RVR. Patient had an episode. Patient is getting stress test.      Discussed with the patient about atrial flutter pathophysiology. If it is truly atrial flutter and atrial flutter ablation has high success rate and may be can come off anticoagulation if he has no arrhythmia like A. fib in future. Discussed with the patient that patients with atrial flutter can have atrial fibrillation higher risk    The risks including, but not limited to, vascular injury, bleeding, infection, radiation exposure, injury to cardiac and surrounding structures (including esophageal and pulmonary vein injury), injury to the normal conduction system of the heart (possibly requiring a pacemaker), stroke, myocardial infarction and death were all discussed. The patient considered the risks, benefits and alternatives; decided to proceed with the procedure. Patient wants to proceed with ablation. Patient does have a stress test scheduled and if the stress test is normal then we will schedule for ablation    Scheduled for atrial flutter ablation so that may be can come off anticoagulation if it is truly atypical atrial flutter and has no arrhythmia in future    On EP study if he can induce atrial flutter and then will study it and can ablate at the same time. If it had any other arrhythmias induced then will ablate the other arrhythmia.   If not inducible which can happen at times under anesthesia we will perform the CTI ablation for typical flutter    Discussed with the patient about weight loss. Discussed with the patient avoid caffeine and alcohol (which patient does not drink)      Thanks again for allowing me to participate in care of this patient. Please call me if you have any questions. With best regards. Heather Guy MD, 12/3/2021 1:54 PM     Please note this report has been partially produced using speech recognition software and may contain errors related to that system including errors in grammar, punctuation, and spelling, as well as words and phrases that may be inappropriate. If there are any questions or concerns please feel free to contact the dictating provider for clarification.

## 2021-12-03 NOTE — TELEPHONE ENCOUNTER
No PA needed. Spoke with patient and spouse. Patient will have new insurance after 12/31/21. Wife will call after the first of the year. Patient will get samples to get him through until the insurance rolls over.

## 2021-12-06 ENCOUNTER — TELEPHONE (OUTPATIENT)
Dept: CARDIOLOGY CLINIC | Age: 71
End: 2021-12-06

## 2021-12-06 NOTE — TELEPHONE ENCOUNTER
Results given to the patient. Left ventricular function and size is normal, EF is estimated at 55-60%. Mild left ventricular hypertrophy. Grade I diastolic dysfunction. No regional wall motion abnormalities were detected. Mildly dilated left atrium. No significant valvular disease noted. RVSP is 14 mmHg. No evidence of pericardial effusion.

## 2021-12-09 ENCOUNTER — TELEPHONE (OUTPATIENT)
Dept: CARDIOLOGY CLINIC | Age: 71
End: 2021-12-09

## 2021-12-09 NOTE — TELEPHONE ENCOUNTER
Patient's wife called requesting to schedule Mindikeenan Dockeryas follow up, please call her back at ph# [phone].

## 2021-12-10 DIAGNOSIS — I48.92 ATRIAL FLUTTER BY ELECTROCARDIOGRAM (HCC): Primary | ICD-10-CM

## 2021-12-13 ENCOUNTER — OFFICE VISIT (OUTPATIENT)
Dept: CARDIOLOGY CLINIC | Age: 71
End: 2021-12-13
Payer: MEDICARE

## 2021-12-13 VITALS
BODY MASS INDEX: 36.29 KG/M2 | WEIGHT: 245 LBS | HEIGHT: 69 IN | HEART RATE: 74 BPM | DIASTOLIC BLOOD PRESSURE: 80 MMHG | SYSTOLIC BLOOD PRESSURE: 150 MMHG

## 2021-12-13 DIAGNOSIS — E78.49 OTHER HYPERLIPIDEMIA: Chronic | ICD-10-CM

## 2021-12-13 DIAGNOSIS — I10 ESSENTIAL HYPERTENSION: Chronic | ICD-10-CM

## 2021-12-13 DIAGNOSIS — I48.92 ATRIAL FLUTTER BY ELECTROCARDIOGRAM (HCC): Primary | ICD-10-CM

## 2021-12-13 DIAGNOSIS — E66.01 SEVERE OBESITY (BMI 35.0-35.9 WITH COMORBIDITY) (HCC): ICD-10-CM

## 2021-12-13 PROBLEM — E66.09 CLASS 2 OBESITY DUE TO EXCESS CALORIES WITH BODY MASS INDEX (BMI) OF 36.0 TO 36.9 IN ADULT: Status: ACTIVE | Noted: 2020-09-11

## 2021-12-13 PROBLEM — E66.812 CLASS 2 OBESITY DUE TO EXCESS CALORIES WITH BODY MASS INDEX (BMI) OF 36.0 TO 36.9 IN ADULT: Status: ACTIVE | Noted: 2020-09-11

## 2021-12-13 PROCEDURE — G8417 CALC BMI ABV UP PARAM F/U: HCPCS | Performed by: NURSE PRACTITIONER

## 2021-12-13 PROCEDURE — G8484 FLU IMMUNIZE NO ADMIN: HCPCS | Performed by: NURSE PRACTITIONER

## 2021-12-13 PROCEDURE — G8427 DOCREV CUR MEDS BY ELIG CLIN: HCPCS | Performed by: NURSE PRACTITIONER

## 2021-12-13 PROCEDURE — 1036F TOBACCO NON-USER: CPT | Performed by: NURSE PRACTITIONER

## 2021-12-13 PROCEDURE — 99214 OFFICE O/P EST MOD 30 MIN: CPT | Performed by: NURSE PRACTITIONER

## 2021-12-13 PROCEDURE — 3017F COLORECTAL CA SCREEN DOC REV: CPT | Performed by: NURSE PRACTITIONER

## 2021-12-13 PROCEDURE — 1123F ACP DISCUSS/DSCN MKR DOCD: CPT | Performed by: NURSE PRACTITIONER

## 2021-12-13 PROCEDURE — 4040F PNEUMOC VAC/ADMIN/RCVD: CPT | Performed by: NURSE PRACTITIONER

## 2021-12-13 RX ORDER — LOSARTAN POTASSIUM 100 MG/1
100 TABLET ORAL DAILY
Qty: 90 TABLET | Refills: 3 | Status: SHIPPED | OUTPATIENT
Start: 2021-12-13

## 2021-12-13 ASSESSMENT — ENCOUNTER SYMPTOMS
SHORTNESS OF BREATH: 0
ORTHOPNEA: 0

## 2021-12-13 NOTE — PROGRESS NOTES
12/13/2021  Primary cardiologist: Dr. Car Cleaning  is an established 73 y.o.  male here for follow-up on testing- stress and echo  H/o HTN- atrial flutter       SUBJECTIVE/OBJECTIVE:    HPI : Marcial Giron is a pleasant 82-year-old gentleman with a history of hypertension and GERD. He was seen as a consult November 2021 for atrial flutter and chest pain. In the emergency department he was noted to be in atrial flutter with RVR. He was given 6 mg of adenosine which briefly slowed rate down to show atrial flutter waves. He was started on Cardizem drip and transitioned over to p.o. He converted to sinus rhythm. He underwent noninvasive testing with echocardiogram and stress Cardiolite. He has been evaluated by EP and is scheduled for atrial flutter ablation . He is decreased caffeine intake and has stopped ETOH use. Marcial Giron reports he is feeling better. There has been no further chest pain or palpitations. Review of Systems   Constitutional: Negative for diaphoresis and malaise/fatigue. Cardiovascular: Negative for chest pain, claudication, dyspnea on exertion, irregular heartbeat, leg swelling, near-syncope, orthopnea, palpitations and paroxysmal nocturnal dyspnea. Respiratory: Negative for shortness of breath. Neurological: Negative for dizziness and light-headedness. Vitals:    12/13/21 0838   BP: (!) 150/80   Site: Left Upper Arm   Position: Sitting   Cuff Size: Large Adult   Pulse: 74   Weight: 245 lb (111.1 kg)   Height: 5' 9\" (1.753 m)     Patient-Reported Vitals 3/11/2021   Patient-Reported Weight 239lb   Patient-Reported Height 70\"   Patient-Reported Systolic 402   Patient-Reported Diastolic 82     Wt Readings from Last 3 Encounters:   12/13/21 245 lb (111.1 kg)   12/03/21 243 lb 6.4 oz (110.4 kg)   11/29/21 245 lb 6.4 oz (111.3 kg)     Body mass index is 36.18 kg/m². Physical Exam  Constitutional:       Appearance: Normal appearance. He is obese.    Cardiovascular:      Rate and Rhythm: Normal rate and regular rhythm. Heart sounds: Normal heart sounds. Pulmonary:      Effort: Pulmonary effort is normal.      Breath sounds: Normal breath sounds. Musculoskeletal:      Right lower leg: No edema. Left lower leg: No edema. Skin:     General: Skin is warm and dry. Neurological:      General: No focal deficit present. Mental Status: He is alert and oriented to person, place, and time. Current Outpatient Medications   Medication Sig Dispense Refill    apixaban (ELIQUIS) 5 MG TABS tablet Take 1 tablet by mouth 2 times daily 28 tablet 0    dilTIAZem (CARDIZEM CD) 120 MG extended release capsule Take 1 capsule by mouth daily 30 capsule 0    sildenafil (VIAGRA) 100 MG tablet Take 1 tablet by mouth daily as needed for Erectile Dysfunction 30 tablet 1    ezetimibe (ZETIA) 10 MG tablet Take 1 tablet by mouth daily 30 tablet 3    naproxen (NAPROSYN) 500 MG tablet Take 1 tablet by mouth 2 times daily as needed (joint pain) 180 tablet 1    omeprazole (PRILOSEC) 40 MG delayed release capsule Take 1 capsule by mouth daily 90 capsule 1    losartan (COZAAR) 50 MG tablet Take 1 tablet by mouth daily 90 tablet 1    Ascorbic Acid (VITAMIN C) 250 MG tablet Take 250 mg by mouth daily      Multiple Vitamins-Minerals (HAIR SKIN AND NAILS FORMULA PO) Take 1 tablet by mouth daily      fexofenadine (ALLEGRA) 180 MG tablet Take 180 mg by mouth as needed. No current facility-administered medications for this visit.           All pertinent data reviewed and discussed with patient       ASSESSMENT/PLAN:    Hypertension   Blood pressure is Uncontrolled  Recommend to increase  losartan to 100 mg daily   Low-sodium diet    Atrial flutter  In RRR  Continue with Cardizem and anticoagulation with eliquis   [id number] Score for Atrial Fibrillation Stroke Risk   Risk   Factors  Component Value   C CHF No 0   H HTN Yes 1   A2 Age >= 75 No,  (77 y.o.) 0   D DM No 0   S2 Prior Stroke/TIA No 0   V Vascular Disease No 0   A Age 74-69 Yes,  (77 y.o.) 1   Sc Sex male 0    [id number]  Score  2     Continue to avoid EtOH. Also encouraged to discontinue caffeine use even decaffeinated and half-and-half coffee can have anywhere from 7 to 11% caffeine use. He voices concern in regards to use of anticoagulation secondary to cost.  3-month supply of anticoagulation is greater than $500 which may be a barrier to care. He is using good Rx to find most affordable means. Has been given 30-day sample card as well. We will try to supply with samples of Eliquis as available to assist with cost.    To follow with EP for ablation     hyperlipidemia    Results for Becky Braun (MRN [id number])   Ref. Range 10/1/2021 08:38   Cholesterol, Total Latest Ref Range: 0 - 199 mg/dL 164   HDL Cholesterol Latest Ref Range: 40 - 60 mg/dL 73 (H)   LDL Calculated Latest Ref Range: <100 mg/dL 80   Triglycerides Latest Ref Range: 0 - 150 mg/dL 53   VLDL Cholesterol Calculated Latest Ref Range: Not Established mg/dL 11     Goals for lipids reviewed  encouraged healthy eating habits including low fat -low cholesterol diet  Continue with zetia         Medications reviewed and confirmed with patient     Tests ordered:  none      Follow-up  6 months      Signed:  VIN Malin CNP, 12/13/2021, 8:56 AM    An electronic signature was used to authenticate this note. Please note this report has been partially produced using speech recognition software and may contain errors related to that system including errors in grammar, punctuation, and spelling, as well as words and phrases that may be inappropriate. If there are any questions or concerns please feel free to contact the dictating provider for clarification.

## 2021-12-13 NOTE — PATIENT INSTRUCTIONS
Please be informed that if you contact our office outside of normal business hours the physician on call cannot help with any scheduling or rescheduling issues, procedure instruction questions or any type of medication issue. We advise you for any urgent/emergency that you go to the nearest emergency room! PLEASE CALL OUR OFFICE DURING NORMAL BUSINESS HOURS    Monday - Friday   8 am to 5 pm    [place]: [place] [address]: [phone]    [place]:  [phone]    **It is YOUR responsibilty to bring medication bottles and/or updated medication list to [address] [place] [address].  This will allow us to better serve you and all your healthcare needs**

## 2021-12-13 NOTE — LETTER
Zack Schlatter Dr. Talbot Buerger  1950  [id number]    Have you had any Chest Pain that is not new? - No  If Yes DO EKG - How does it feel -    How long does the pain last -      How long have you been having the pain -    Did you take a    And did it relieve the pain -      DO EKG IF: Patient has a Heart Rate above 100 or below 40     CAD (Coronary Artery Disease) patient should have one on file every 6 months        Have you had any Shortness of Breath - No  If Yes - When     Have you had any dizziness - No  If Yes DO ORTHOSTATIC BP - when do you feel dizzy    How long does it last .        Sitting wait 5 minutes do supine (laying down) wait 5 minutes then do standing - log each in \"vitals\" area in Epic   Be sure to ask what symptoms they are having if they get dizzy while completing ortho stats such as: room spinning, nausea, etc.    Have you had any palpitations that are not new? - No  If Yes DO EKG - Do you feel your heart   How long does it last - . Is the patient on any of the following medications -   If Yes DO EKG - Needs done every 6 months    Do you have any edema - swelling in No    If Yes - CHECK TO SEE IF THE EDEMA IS PITTING  How long have they been having edema -   If Yes - Have they worn compression stockings   If they have worn compression stockings      Vein \"LEG PROBLEM Questionnaire\"  1. Do you have prominent leg veins? 2. Do you have any skin discoloration? 3. Do you have any healed or active sores? 4. Do you have swelling of the legs? 5. Do you have a family history of varicose veins? 6. Does your profession involve pro-longed        standing or heavy lifting? 7. Have you been fighting overweight problems? 8. Do you have restless legs? 9. Do you have any night time cramps?     10. Do you have any of the following in your legs:             When did you have your last labs drawn 11/26/21  Where did you have them done What doctor ordered     If we do not have these labs you are retrieve these labs for these providers!     Do you have a surgery or procedure scheduled in the near future - Yes  If Yes- DO EKG  If Yes - Who is the surgery with? 12/21/21  Phone number of physician   Fax number of physician   Type of surgery   GIVE THIS INFORMATION TO SINDI CROCKER     Ask patient if they want to sign up for MyChart if they are not already signed up     Check to see if we have an E-MAIL on file for the patient     Check medication list thoroughly!!! AND RECONCILE OUTSIDE MEDICATIONS  If dose has changed change the entire order not just the [place] At check out add to every patient's \"wrap up\" the following dot phrase AFTERHOURSEDUCATION and ensure we explain this to our patients

## 2021-12-15 ENCOUNTER — NURSE ONLY (OUTPATIENT)
Dept: CARDIOLOGY CLINIC | Age: 71
End: 2021-12-15

## 2021-12-15 ENCOUNTER — HOSPITAL ENCOUNTER (OUTPATIENT)
Age: 71
Setting detail: SPECIMEN
Discharge: HOME | End: 2021-12-15
Payer: MEDICARE

## 2021-12-15 VITALS — DIASTOLIC BLOOD PRESSURE: 76 MMHG | HEART RATE: 80 BPM | SYSTOLIC BLOOD PRESSURE: 124 MMHG | TEMPERATURE: 96.8 F

## 2021-12-15 DIAGNOSIS — I48.91 ATRIAL FIBRILLATION, UNSPECIFIED TYPE (HCC): Primary | ICD-10-CM

## 2021-12-15 PROCEDURE — U0005 INFEC AGEN DETEC AMPLI PROBE: HCPCS

## 2021-12-15 PROCEDURE — U0003 INFECTIOUS AGENT DETECTION BY NUCLEIC ACID (DNA OR RNA); SEVERE ACUTE RESPIRATORY SYNDROME CORONAVIRUS 2 (SARS-COV-2) (CORONAVIRUS DISEASE [COVID-19]), AMPLIFIED PROBE TECHNIQUE, MAKING USE OF HIGH THROUGHPUT TECHNOLOGIES AS DESCRIBED BY CMS-2020-01-R: HCPCS

## 2021-12-15 NOTE — PROGRESS NOTES
Patient here in office and educated on A flutter ablation, schedule for 12/21/21 @ 1130, with arrival @ 0930, @ [facility]; risk explained; and consents signed. Also copy of orders given for labs and CXR due 12/15/21 at [facility]. Instruction given to patient to :  NPO after midnight the night before procedure; call hospital at [phone] to pre-register. May take rest of morning meds of procedure. Hold ALL blood pressure medications morning of procedure. Hold Eliquis the evening dose night before procedure and Morning dose of the procedure. Patient voiced understanding. Copies of consent & info scanned in chart. june 2011

## 2021-12-16 ENCOUNTER — HOSPITAL ENCOUNTER (OUTPATIENT)
Dept: GENERAL RADIOLOGY | Age: 71
Discharge: HOME | End: 2021-12-16
Payer: MEDICARE

## 2021-12-16 ENCOUNTER — HOSPITAL ENCOUNTER (OUTPATIENT)
Age: 71
Discharge: HOME | End: 2021-12-16
Payer: MEDICARE

## 2021-12-16 DIAGNOSIS — Z79.01 LONG TERM (CURRENT) USE OF ANTICOAGULANTS: ICD-10-CM

## 2021-12-16 DIAGNOSIS — Z01.810 PRE-OPERATIVE CARDIOVASCULAR EXAMINATION: ICD-10-CM

## 2021-12-16 DIAGNOSIS — I48.91 ATRIAL FIBRILLATION, UNSPECIFIED TYPE (HCC): ICD-10-CM

## 2021-12-16 LAB
ANION GAP SERPL CALCULATED.3IONS-SCNC: 8 MMOL/L (ref 4–16)
APTT: 35.9 SECONDS (ref 25.1–37.1)
BASOPHILS ABSOLUTE: 0 K/CU MM
BASOPHILS RELATIVE PERCENT: 0.3 % (ref 0–1)
BUN BLDV-MCNC: 17 MG/DL (ref 6–23)
CALCIUM SERPL-MCNC: 9.1 MG/DL (ref 8.3–10.6)
CHLORIDE BLD-SCNC: 104 MMOL/L (ref 99–110)
CO2: 27 MMOL/L (ref 21–32)
CREAT SERPL-MCNC: 0.8 MG/DL (ref 0.9–1.3)
DIFFERENTIAL TYPE: ABNORMAL
EOSINOPHILS ABSOLUTE: 0 K/CU MM
EOSINOPHILS RELATIVE PERCENT: 0.4 % (ref 0–3)
GFR AFRICAN AMERICAN: >60 ML/MIN/1.73M2
GFR NON-AFRICAN AMERICAN: >60 ML/MIN/1.73M2
GLUCOSE BLD-MCNC: 125 MG/DL (ref 70–99)
HCT VFR BLD CALC: 45 % (ref 42–52)
HEMOGLOBIN: 14.8 GM/DL (ref 13.5–18)
IMMATURE NEUTROPHIL %: 0.3 % (ref 0–0.43)
INR BLD: 1.18 INDEX
LYMPHOCYTES ABSOLUTE: 1.9 K/CU MM
LYMPHOCYTES RELATIVE PERCENT: 18.3 % (ref 24–44)
MAGNESIUM: 2.2 MG/DL (ref 1.8–2.4)
MCH RBC QN AUTO: 30.2 PG (ref 27–31)
MCHC RBC AUTO-ENTMCNC: 32.9 % (ref 32–36)
MCV RBC AUTO: 91.8 FL (ref 78–100)
MONOCYTES ABSOLUTE: 0.8 K/CU MM
MONOCYTES RELATIVE PERCENT: 7.7 % (ref 0–4)
NUCLEATED RBC %: 0 %
PDW BLD-RTO: 12.2 % (ref 11.7–14.9)
PHOSPHORUS: 3.3 MG/DL (ref 2.5–4.9)
PLATELET # BLD: 226 K/CU MM (ref 140–440)
PMV BLD AUTO: 10.2 FL (ref 7.5–11.1)
POTASSIUM SERPL-SCNC: 4.6 MMOL/L (ref 3.5–5.1)
PROTHROMBIN TIME: 15.2 SECONDS (ref 11.7–14.5)
RBC # BLD: 4.9 M/CU MM (ref 4.6–6.2)
SARS-COV-2: NOT DETECTED
SEGMENTED NEUTROPHILS ABSOLUTE COUNT: 7.7 K/CU MM
SEGMENTED NEUTROPHILS RELATIVE PERCENT: 73 % (ref 36–66)
SODIUM BLD-SCNC: 139 MMOL/L (ref 135–145)
SOURCE: NORMAL
TOTAL IMMATURE NEUTOROPHIL: 0.03 K/CU MM
TOTAL NUCLEATED RBC: 0 K/CU MM
WBC # BLD: 10.5 K/CU MM (ref 4–10.5)

## 2021-12-16 PROCEDURE — 85025 COMPLETE CBC W/AUTO DIFF WBC: CPT

## 2021-12-16 PROCEDURE — 85730 THROMBOPLASTIN TIME PARTIAL: CPT

## 2021-12-16 PROCEDURE — 36415 COLL VENOUS BLD VENIPUNCTURE: CPT

## 2021-12-16 PROCEDURE — 83735 ASSAY OF MAGNESIUM: CPT

## 2021-12-16 PROCEDURE — 80048 BASIC METABOLIC PNL TOTAL CA: CPT

## 2021-12-16 PROCEDURE — 71046 X-RAY EXAM CHEST 2 VIEWS: CPT

## 2021-12-16 PROCEDURE — 84100 ASSAY OF PHOSPHORUS: CPT

## 2021-12-16 PROCEDURE — 85610 PROTHROMBIN TIME: CPT

## 2021-12-19 ENCOUNTER — ANESTHESIA EVENT (OUTPATIENT)
Dept: CARDIAC CATH/INVASIVE PROCEDURES | Age: 71
End: 2021-12-19
Payer: MEDICARE

## 2021-12-19 NOTE — ANESTHESIA PRE PROCEDURE
Department of Anesthesiology  Preprocedure Note       Name:  Jazlyn Howard   Age:  73 y.o.  :  1950                                          MRN:  [id number]         Date:  2021      Surgeon: * Surgery not found *    Procedure:     Medications prior to admission:   Prior to Admission medications    Medication Sig Start Date End Date Taking? Authorizing Provider   losartan (COZAAR) 100 MG tablet Take 1 tablet by mouth daily 21   VIN Mao - CNP   apixaban (ELIQUIS) 5 MG TABS tablet Take 1 tablet by mouth 2 times daily 21  Kosta Mendoza APRN - CNP   dilTIAZem (CARDIZEM CD) 120 MG extended release capsule Take 1 capsule by mouth daily 21   Dwana Nissen, MD   sildenafil (VIAGRA) 100 MG tablet Take 1 tablet by mouth daily as needed for Erectile Dysfunction 10/28/21   JERMAINE Cardenas   ezetimibe (ZETIA) 10 MG tablet Take 1 tablet by mouth daily 21   Betsy Vega MD   naproxen (NAPROSYN) 500 MG tablet Take 1 tablet by mouth 2 times daily as needed (joint pain) 21   Betsy Vega MD   omeprazole (PRILOSEC) 40 MG delayed release capsule Take 1 capsule by mouth daily 21   Betsy Vega MD   Ascorbic Acid (VITAMIN C) 250 MG tablet Take 250 mg by mouth daily    Historical Provider, MD   Multiple Vitamins-Minerals (HAIR SKIN AND NAILS FORMULA PO) Take 1 tablet by mouth daily    Historical Provider, MD   fexofenadine (ALLEGRA) 180 MG tablet Take 180 mg by mouth as needed.     Historical Provider, MD       Current medications:    Current Outpatient Medications   Medication Sig Dispense Refill    losartan (COZAAR) 100 MG tablet Take 1 tablet by mouth daily 90 tablet 3    apixaban (ELIQUIS) 5 MG TABS tablet Take 1 tablet by mouth 2 times daily 28 tablet 0    dilTIAZem (CARDIZEM CD) 120 MG extended release capsule Take 1 capsule by mouth daily 30 capsule 0    sildenafil (VIAGRA) 100 MG tablet Take 1 tablet by mouth daily as needed for Erectile diverticulosis, colon polyps, internal hemorrhoids    KNEE SURGERY Bilateral     Scopes of both knees.  TONGUE SURGERY      Exploratory of lump under tongue. Normal tissue.  VASECTOMY  1995       Social History:    Social History     Tobacco Use    Smoking status: Former Smoker     Packs/day: 1.00     Years: 13.00     Pack years: 13.00     Types: Cigarettes     Start date: 1972     Quit date: 1985     Years since quittin.3    Smokeless tobacco: Never Used   Substance Use Topics    Alcohol use: Yes     Comment: two mixed drinks 5 days a week                                Counseling given: Not Answered      Vital Signs (Current): There were no vitals filed for this visit. BP Readings from Last 3 Encounters:   12/15/21 124/76   21 (!) 150/80   21 134/80       NPO Status:                                                                                 BMI:   Wt Readings from Last 3 Encounters:   21 245 lb (111.1 kg)   21 243 lb 6.4 oz (110.4 kg)   21 245 lb 6.4 oz (111.3 kg)     There is no height or weight on file to calculate BMI.    CBC:   Lab Results   Component Value Date    WBC 10.5 2021    RBC 4.90 2021    HGB 14.8 2021    HCT 45.0 2021    MCV 91.8 2021    RDW 12.2 2021     2021       CMP:   Lab Results   Component Value Date     2021    K 4.6 2021     2021    CO2 27 2021    BUN 17 2021    CREATININE 0.8 2021    GFRAA >60 2021    AGRATIO 1.7 10/01/2021    LABGLOM >60 2021    GLUCOSE 125 2021    PROT 7.0 2021    CALCIUM 9.1 2021    BILITOT 0.2 2021    ALKPHOS 79 2021    AST 19 2021    ALT 22 2021       POC Tests: No results for input(s): POCGLU, POCNA, POCK, POCCL, POCBUN, POCHEMO, POCHCT in the last 72 hours.     Coags:   Lab Results   Component Value Date PROTIME 15.2 12/16/2021    INR 1.18 12/16/2021    APTT 35.9 12/16/2021       HCG (If Applicable): No results found for: PREGTESTUR, PREGSERUM, HCG, HCGQUANT     ABGs: No results found for: PHART, PO2ART, GNO9DWX, QQP2XWC, BEART, F7YLULEP     Type & Screen (If Applicable):  No results found for: LABABO, LABRH    Drug/Infectious Status (If Applicable):  No results found for: HIV, HEPCAB    COVID-19 Screening (If Applicable):   Lab Results   Component Value Date    COVID19 NOT DETECTED 12/15/2021           Anesthesia Evaluation   history of anesthetic complications (S/P discectomy for herniated nucleus pulposus C6-C7 ): Airway:         Dental:          Pulmonary:                             ROS comment: Former smoker   Cardiovascular:    (+) hypertension:, dysrhythmias: atrial flutter,          Beta Blocker:  Not on Beta Blocker         Neuro/Psych:   Negative Neuro/Psych ROS              GI/Hepatic/Renal:   (+) GERD:, morbid obesity          Endo/Other:    (+) blood dyscrasia: anticoagulation therapy, arthritis: OA., malignancy/cancer (skin). Abdominal:   (+) obese,           Vascular: negative vascular ROS. Other Findings:           Anesthesia Plan      general     ASA 3       Induction: intravenous. arterial line  MIPS: Prophylactic antiemetics administered. Anesthetic plan and risks discussed with patient. Pre Anesthesia Assessment complete. Chart reviewed on 12/19/2021. This is a chart review only.       Joyce Davis DO   12/19/2021

## 2021-12-20 ENCOUNTER — TELEPHONE (OUTPATIENT)
Dept: CARDIOLOGY CLINIC | Age: 71
End: 2021-12-20

## 2021-12-20 NOTE — TELEPHONE ENCOUNTER
Spoke with patients wife. She had question if patient was to hold Cardizem pre-procedure. Patient can continue Cardizem as prescribed.

## 2021-12-21 ENCOUNTER — ANESTHESIA (OUTPATIENT)
Dept: CARDIAC CATH/INVASIVE PROCEDURES | Age: 71
End: 2021-12-21
Payer: MEDICARE

## 2021-12-21 ENCOUNTER — HOSPITAL ENCOUNTER (OUTPATIENT)
Dept: CARDIAC CATH/INVASIVE PROCEDURES | Age: 71
Discharge: HOME | End: 2021-12-22
Attending: INTERNAL MEDICINE | Admitting: INTERNAL MEDICINE
Payer: MEDICARE

## 2021-12-21 VITALS
TEMPERATURE: 98 F | OXYGEN SATURATION: 91 % | RESPIRATION RATE: 7 BRPM | DIASTOLIC BLOOD PRESSURE: 76 MMHG | SYSTOLIC BLOOD PRESSURE: 105 MMHG

## 2021-12-21 PROBLEM — Z86.79 S/P ABLATION OF ATRIAL FLUTTER: Status: ACTIVE | Noted: 2021-12-21

## 2021-12-21 PROBLEM — Z98.890 S/P ABLATION OF ATRIAL FLUTTER: Status: ACTIVE | Noted: 2021-12-21

## 2021-12-21 LAB
ABO/RH: NORMAL
ACTIVATED CLOTTING TIME, LOW RANGE: 137 SEC
ANTIBODY SCREEN: NEGATIVE
BASE EXCESS MIXED: 1.9 (ref 0–1.2)
BASE EXCESS: ABNORMAL (ref 0–3.3)
CO2: 28 MMOL/L (ref 21–32)
GLUCOSE BLD-MCNC: 91 MG/DL (ref 70–99)
HCO3 ARTERIAL: 26.5 MMOL/L (ref 18–23)
HCT VFR BLD CALC: 46 % (ref 42–52)
HEMOGLOBIN: 15.6 GM/DL (ref 13.5–18)
O2 SATURATION: 99.9 % (ref 96–97)
PCO2 ARTERIAL: 40.6 MMHG (ref 32–45)
PH BLOOD: 7.42 (ref 7.34–7.45)
PO2 ARTERIAL: 263.7 MMHG (ref 75–100)
POC CALCIUM: 1.2 MMOL/L (ref 1.12–1.32)
POC CHLORIDE: 105 MMOL/L (ref 98–109)
POC CREATININE: 0.8 MG/DL (ref 0.9–1.3)
POTASSIUM SERPL-SCNC: 4 MMOL/L (ref 3.5–4.5)
SODIUM BLD-SCNC: 143 MMOL/L (ref 138–146)
SOURCE, BLOOD GAS: ABNORMAL

## 2021-12-21 PROCEDURE — 86901 BLOOD TYPING SEROLOGIC RH(D): CPT

## 2021-12-21 PROCEDURE — 85347 COAGULATION TIME ACTIVATED: CPT

## 2021-12-21 PROCEDURE — 7100000000 HC PACU RECOVERY - FIRST 15 MIN

## 2021-12-21 PROCEDURE — 82803 BLOOD GASES ANY COMBINATION: CPT

## 2021-12-21 PROCEDURE — 36620 INSERTION CATHETER ARTERY: CPT | Performed by: ANESTHESIOLOGY

## 2021-12-21 PROCEDURE — 80051 ELECTROLYTE PANEL: CPT

## 2021-12-21 PROCEDURE — 6370000000 HC RX 637 (ALT 250 FOR IP): Performed by: NURSE PRACTITIONER

## 2021-12-21 PROCEDURE — 93653 COMPRE EP EVAL TX SVT: CPT

## 2021-12-21 PROCEDURE — 82565 ASSAY OF CREATININE: CPT

## 2021-12-21 PROCEDURE — 93613 INTRACARDIAC EPHYS 3D MAPG: CPT | Performed by: INTERNAL MEDICINE

## 2021-12-21 PROCEDURE — 82962 GLUCOSE BLOOD TEST: CPT

## 2021-12-21 PROCEDURE — C1732 CATH, EP, DIAG/ABL, 3D/VECT: HCPCS

## 2021-12-21 PROCEDURE — 6360000002 HC RX W HCPCS

## 2021-12-21 PROCEDURE — 2580000003 HC RX 258: Performed by: NURSE ANESTHETIST, CERTIFIED REGISTERED

## 2021-12-21 PROCEDURE — 93621 COMP EP EVL L PAC&REC C SINS: CPT | Performed by: INTERNAL MEDICINE

## 2021-12-21 PROCEDURE — 3700000000 HC ANESTHESIA ATTENDED CARE

## 2021-12-21 PROCEDURE — 2500000003 HC RX 250 WO HCPCS: Performed by: NURSE ANESTHETIST, CERTIFIED REGISTERED

## 2021-12-21 PROCEDURE — 7100000001 HC PACU RECOVERY - ADDTL 15 MIN

## 2021-12-21 PROCEDURE — 93621 COMP EP EVL L PAC&REC C SINS: CPT

## 2021-12-21 PROCEDURE — 2500000003 HC RX 250 WO HCPCS

## 2021-12-21 PROCEDURE — C1733 CATH, EP, OTHR THAN COOL-TIP: HCPCS

## 2021-12-21 PROCEDURE — 93005 ELECTROCARDIOGRAM TRACING: CPT | Performed by: INTERNAL MEDICINE

## 2021-12-21 PROCEDURE — 93613 INTRACARDIAC EPHYS 3D MAPG: CPT

## 2021-12-21 PROCEDURE — 93653 COMPRE EP EVAL TX SVT: CPT | Performed by: INTERNAL MEDICINE

## 2021-12-21 PROCEDURE — 93312 ECHO TRANSESOPHAGEAL: CPT

## 2021-12-21 PROCEDURE — 93312 ECHO TRANSESOPHAGEAL: CPT | Performed by: INTERNAL MEDICINE

## 2021-12-21 PROCEDURE — C1894 INTRO/SHEATH, NON-LASER: HCPCS

## 2021-12-21 PROCEDURE — 86850 RBC ANTIBODY SCREEN: CPT

## 2021-12-21 PROCEDURE — 85018 HEMOGLOBIN: CPT

## 2021-12-21 PROCEDURE — 93325 DOPPLER ECHO COLOR FLOW MAPG: CPT | Performed by: INTERNAL MEDICINE

## 2021-12-21 PROCEDURE — 2580000003 HC RX 258: Performed by: NURSE PRACTITIONER

## 2021-12-21 PROCEDURE — 6360000002 HC RX W HCPCS: Performed by: NURSE ANESTHETIST, CERTIFIED REGISTERED

## 2021-12-21 PROCEDURE — 2709999900 HC NON-CHARGEABLE SUPPLY

## 2021-12-21 PROCEDURE — 86900 BLOOD TYPING SEROLOGIC ABO: CPT

## 2021-12-21 PROCEDURE — 3700000001 HC ADD 15 MINUTES (ANESTHESIA)

## 2021-12-21 PROCEDURE — C1730 CATH, EP, 19 OR FEW ELECT: HCPCS

## 2021-12-21 PROCEDURE — 85014 HEMATOCRIT: CPT

## 2021-12-21 RX ORDER — PROPOFOL 10 MG/ML
INJECTION, EMULSION INTRAVENOUS PRN
Status: DISCONTINUED | OUTPATIENT
Start: 2021-12-21 | End: 2021-12-21 | Stop reason: SDUPTHER

## 2021-12-21 RX ORDER — ONDANSETRON 2 MG/ML
INJECTION INTRAMUSCULAR; INTRAVENOUS PRN
Status: DISCONTINUED | OUTPATIENT
Start: 2021-12-21 | End: 2021-12-21 | Stop reason: SDUPTHER

## 2021-12-21 RX ORDER — CETIRIZINE HYDROCHLORIDE 10 MG/1
10 TABLET ORAL DAILY
Status: DISCONTINUED | OUTPATIENT
Start: 2021-12-21 | End: 2021-12-22 | Stop reason: HOSPADM

## 2021-12-21 RX ORDER — ACETAMINOPHEN 325 MG/1
650 TABLET ORAL EVERY 4 HOURS PRN
Status: DISCONTINUED | OUTPATIENT
Start: 2021-12-21 | End: 2021-12-22 | Stop reason: HOSPADM

## 2021-12-21 RX ORDER — SODIUM CHLORIDE, SODIUM LACTATE, POTASSIUM CHLORIDE, CALCIUM CHLORIDE 600; 310; 30; 20 MG/100ML; MG/100ML; MG/100ML; MG/100ML
INJECTION, SOLUTION INTRAVENOUS CONTINUOUS PRN
Status: DISCONTINUED | OUTPATIENT
Start: 2021-12-21 | End: 2021-12-21 | Stop reason: SDUPTHER

## 2021-12-21 RX ORDER — LOSARTAN POTASSIUM 50 MG/1
100 TABLET ORAL DAILY
Status: DISCONTINUED | OUTPATIENT
Start: 2021-12-21 | End: 2021-12-22 | Stop reason: HOSPADM

## 2021-12-21 RX ORDER — EZETIMIBE 10 MG/1
10 TABLET ORAL DAILY
Status: DISCONTINUED | OUTPATIENT
Start: 2021-12-21 | End: 2021-12-22 | Stop reason: HOSPADM

## 2021-12-21 RX ORDER — DEXAMETHASONE SODIUM PHOSPHATE 4 MG/ML
INJECTION, SOLUTION INTRA-ARTICULAR; INTRALESIONAL; INTRAMUSCULAR; INTRAVENOUS; SOFT TISSUE PRN
Status: DISCONTINUED | OUTPATIENT
Start: 2021-12-21 | End: 2021-12-21 | Stop reason: SDUPTHER

## 2021-12-21 RX ORDER — SODIUM CHLORIDE 9 MG/ML
25 INJECTION, SOLUTION INTRAVENOUS PRN
Status: DISCONTINUED | OUTPATIENT
Start: 2021-12-21 | End: 2021-12-22 | Stop reason: HOSPADM

## 2021-12-21 RX ORDER — DILTIAZEM HYDROCHLORIDE 120 MG/1
120 CAPSULE, COATED, EXTENDED RELEASE ORAL DAILY
Status: DISCONTINUED | OUTPATIENT
Start: 2021-12-22 | End: 2021-12-22 | Stop reason: HOSPADM

## 2021-12-21 RX ORDER — SODIUM CHLORIDE 0.9 % (FLUSH) 0.9 %
5-40 SYRINGE (ML) INJECTION EVERY 12 HOURS SCHEDULED
Status: DISCONTINUED | OUTPATIENT
Start: 2021-12-21 | End: 2021-12-22 | Stop reason: HOSPADM

## 2021-12-21 RX ORDER — FENTANYL CITRATE 50 UG/ML
INJECTION, SOLUTION INTRAMUSCULAR; INTRAVENOUS PRN
Status: DISCONTINUED | OUTPATIENT
Start: 2021-12-21 | End: 2021-12-21 | Stop reason: SDUPTHER

## 2021-12-21 RX ORDER — ROCURONIUM BROMIDE 10 MG/ML
INJECTION, SOLUTION INTRAVENOUS PRN
Status: DISCONTINUED | OUTPATIENT
Start: 2021-12-21 | End: 2021-12-21 | Stop reason: SDUPTHER

## 2021-12-21 RX ORDER — PANTOPRAZOLE SODIUM 40 MG/1
40 TABLET, DELAYED RELEASE ORAL
Status: DISCONTINUED | OUTPATIENT
Start: 2021-12-22 | End: 2021-12-22 | Stop reason: HOSPADM

## 2021-12-21 RX ORDER — SODIUM CHLORIDE 0.9 % (FLUSH) 0.9 %
5-40 SYRINGE (ML) INJECTION PRN
Status: DISCONTINUED | OUTPATIENT
Start: 2021-12-21 | End: 2021-12-22 | Stop reason: HOSPADM

## 2021-12-21 RX ORDER — LIDOCAINE HYDROCHLORIDE 20 MG/ML
INJECTION, SOLUTION EPIDURAL; INFILTRATION; INTRACAUDAL; PERINEURAL PRN
Status: DISCONTINUED | OUTPATIENT
Start: 2021-12-21 | End: 2021-12-21 | Stop reason: SDUPTHER

## 2021-12-21 RX ADMIN — SODIUM CHLORIDE, PRESERVATIVE FREE 10 ML: 5 INJECTION INTRAVENOUS at 21:28

## 2021-12-21 RX ADMIN — ONDANSETRON 4 MG: 2 INJECTION INTRAMUSCULAR; INTRAVENOUS at 16:18

## 2021-12-21 RX ADMIN — ROCURONIUM BROMIDE 50 MG: 10 INJECTION INTRAVENOUS at 14:07

## 2021-12-21 RX ADMIN — LOSARTAN POTASSIUM 100 MG: 50 TABLET, FILM COATED ORAL at 21:27

## 2021-12-21 RX ADMIN — ROCURONIUM BROMIDE 10 MG: 10 INJECTION INTRAVENOUS at 15:34

## 2021-12-21 RX ADMIN — FENTANYL CITRATE 100 MCG: 50 INJECTION, SOLUTION INTRAMUSCULAR; INTRAVENOUS at 14:06

## 2021-12-21 RX ADMIN — PHENYLEPHRINE HYDROCHLORIDE 500 MCG/MIN: 10 INJECTION INTRAVENOUS at 14:17

## 2021-12-21 RX ADMIN — SUGAMMADEX 200 MG: 100 INJECTION, SOLUTION INTRAVENOUS at 16:05

## 2021-12-21 RX ADMIN — ROCURONIUM BROMIDE 20 MG: 10 INJECTION INTRAVENOUS at 14:59

## 2021-12-21 RX ADMIN — ONDANSETRON 4 MG: 2 INJECTION INTRAMUSCULAR; INTRAVENOUS at 15:59

## 2021-12-21 RX ADMIN — PROPOFOL 150 MG: 10 INJECTION, EMULSION INTRAVENOUS at 14:06

## 2021-12-21 RX ADMIN — SODIUM CHLORIDE, POTASSIUM CHLORIDE, SODIUM LACTATE AND CALCIUM CHLORIDE: 600; 310; 30; 20 INJECTION, SOLUTION INTRAVENOUS at 13:48

## 2021-12-21 RX ADMIN — ACETAMINOPHEN 650 MG: 325 TABLET ORAL at 18:53

## 2021-12-21 RX ADMIN — DEXAMETHASONE SODIUM PHOSPHATE 8 MG: 4 INJECTION, SOLUTION INTRAMUSCULAR; INTRAVENOUS at 14:15

## 2021-12-21 RX ADMIN — LIDOCAINE HYDROCHLORIDE 50 MG: 20 INJECTION, SOLUTION EPIDURAL; INFILTRATION; INTRACAUDAL; PERINEURAL at 14:06

## 2021-12-21 RX ADMIN — RIVAROXABAN 20 MG: 20 TABLET, FILM COATED ORAL at 21:28

## 2021-12-21 ASSESSMENT — PULMONARY FUNCTION TESTS
PIF_VALUE: 0
PIF_VALUE: 12
PIF_VALUE: 0
PIF_VALUE: 15
PIF_VALUE: 0
PIF_VALUE: 19
PIF_VALUE: 19
PIF_VALUE: 0
PIF_VALUE: 1
PIF_VALUE: 18
PIF_VALUE: 19
PIF_VALUE: 19
PIF_VALUE: 20
PIF_VALUE: 18
PIF_VALUE: 2
PIF_VALUE: 19
PIF_VALUE: 18
PIF_VALUE: 18
PIF_VALUE: 19
PIF_VALUE: 19
PIF_VALUE: 1
PIF_VALUE: 0
PIF_VALUE: 18
PIF_VALUE: 0
PIF_VALUE: 18
PIF_VALUE: 1
PIF_VALUE: 20
PIF_VALUE: 18
PIF_VALUE: 20
PIF_VALUE: 18
PIF_VALUE: 18
PIF_VALUE: 29
PIF_VALUE: 18
PIF_VALUE: 1
PIF_VALUE: 19
PIF_VALUE: 20
PIF_VALUE: 18
PIF_VALUE: 19
PIF_VALUE: 0
PIF_VALUE: 17
PIF_VALUE: 0
PIF_VALUE: 17
PIF_VALUE: 0
PIF_VALUE: 19
PIF_VALUE: 18
PIF_VALUE: 19
PIF_VALUE: 12
PIF_VALUE: 19
PIF_VALUE: 18
PIF_VALUE: 18
PIF_VALUE: 19
PIF_VALUE: 19
PIF_VALUE: 0
PIF_VALUE: 0
PIF_VALUE: 19
PIF_VALUE: 0
PIF_VALUE: 0
PIF_VALUE: 19
PIF_VALUE: 17
PIF_VALUE: 0
PIF_VALUE: 12
PIF_VALUE: 0
PIF_VALUE: 2
PIF_VALUE: 12
PIF_VALUE: 18
PIF_VALUE: 18
PIF_VALUE: 0
PIF_VALUE: 19
PIF_VALUE: 0
PIF_VALUE: 0
PIF_VALUE: 19
PIF_VALUE: 17
PIF_VALUE: 29
PIF_VALUE: 19
PIF_VALUE: 17
PIF_VALUE: 18
PIF_VALUE: 18
PIF_VALUE: 0
PIF_VALUE: 3
PIF_VALUE: 20
PIF_VALUE: 20
PIF_VALUE: 18
PIF_VALUE: 10
PIF_VALUE: 1
PIF_VALUE: 19
PIF_VALUE: 19
PIF_VALUE: 18
PIF_VALUE: 19
PIF_VALUE: 1
PIF_VALUE: 18
PIF_VALUE: 19
PIF_VALUE: 18
PIF_VALUE: 19
PIF_VALUE: 0
PIF_VALUE: 0
PIF_VALUE: 19
PIF_VALUE: 20
PIF_VALUE: 19
PIF_VALUE: 23
PIF_VALUE: 19
PIF_VALUE: 19
PIF_VALUE: 12
PIF_VALUE: 19
PIF_VALUE: 19
PIF_VALUE: 18
PIF_VALUE: 19
PIF_VALUE: 18
PIF_VALUE: 19
PIF_VALUE: 18
PIF_VALUE: 19
PIF_VALUE: 18
PIF_VALUE: 19
PIF_VALUE: 12
PIF_VALUE: 17
PIF_VALUE: 19
PIF_VALUE: 18
PIF_VALUE: 12
PIF_VALUE: 19
PIF_VALUE: 0
PIF_VALUE: 19
PIF_VALUE: 19
PIF_VALUE: 17
PIF_VALUE: 18
PIF_VALUE: 19
PIF_VALUE: 19
PIF_VALUE: 0
PIF_VALUE: 18
PIF_VALUE: 0
PIF_VALUE: 0
PIF_VALUE: 21
PIF_VALUE: 19
PIF_VALUE: 18
PIF_VALUE: 0
PIF_VALUE: 0
PIF_VALUE: 18
PIF_VALUE: 0
PIF_VALUE: 18
PIF_VALUE: 0
PIF_VALUE: 18
PIF_VALUE: 0
PIF_VALUE: 18
PIF_VALUE: 2
PIF_VALUE: 18
PIF_VALUE: 1

## 2021-12-21 ASSESSMENT — ENCOUNTER SYMPTOMS
ABDOMINAL PAIN: 0
DIARRHEA: 0
CHEST TIGHTNESS: 0
NAUSEA: 0
BLOOD IN STOOL: 0
VOMITING: 0
COLOR CHANGE: 0
BLOOD IN STOOL: 0
EYE PAIN: 0
DIARRHEA: 0
WHEEZING: 0
WHEEZING: 0
EYE PAIN: 0
SHORTNESS OF BREATH: 0
CONSTIPATION: 0
BACK PAIN: 0
BACK PAIN: 0
COUGH: 0
NAUSEA: 0
PHOTOPHOBIA: 0
SHORTNESS OF BREATH: 0
ABDOMINAL PAIN: 0
COUGH: 0
CONSTIPATION: 0
CHEST TIGHTNESS: 0
COLOR CHANGE: 0
PHOTOPHOBIA: 0
VOMITING: 0

## 2021-12-21 ASSESSMENT — PAIN SCALES - GENERAL
PAINLEVEL_OUTOF10: 0
PAINLEVEL_OUTOF10: 3

## 2021-12-21 NOTE — PROGRESS NOTES
1623: Patient arrived to PACU from EP lab. Groin sheaths clean, dry and intact. Report by Nathanael Scott and [address]. 1635: Patient moved from cart onto bed. Tolerated well. Sheaths remain clean, dry and intact. 1655: EKG being performed at bedside. 1700: [place] removed, patient tolerated well.   1710: Patients wife updated in waiting room of patient status. 1750: Patient transferred to [facility]. Report given bedside to [facility]. Patient wife Elmer Wilburn updated in waiting room, assisted back to patients room to see patient.

## 2021-12-21 NOTE — PROCEDURES
Armand Maya   73 y.o., male  1950 12/21/2021    Attending: Patricia Salgado MD    Sedation : Anesthesia                        Indication:              Pre-atrial flutter ablation                                                                                                                                           After obtaining the informed cosent. Pt brought to EP lab. Sedation per anesthesia . After proper sedation ELISA performed. US Doppler was used to assess abnormalities where appropriate. Post procedure pt is stable. Findings:  LV=  LVEF appears normal  LA=  normal  HARESH= NO CLOTS  RV= normal  RA=  normal  IAS= there was some question of left to right shunt on color but could be an artefact. It was not a clear PFO.  If it is - it is a small one but i cannot exclude it completely  Bubble study=not donefor PFO or ASD  AV= tricuspid, trivial regurgitation and no stenosis  MV= trivial regurgitation, no stenosis  TV= no regurgitation  PV= no significant regurgitation,   Aorta=no significant plaque noted  PUL RADHA FLOW=normal flow pattern    Impression:  No HARESH clot    Plan:  Proceed with ablation

## 2021-12-21 NOTE — ANESTHESIA POSTPROCEDURE EVALUATION
Department of Anesthesiology  Postprocedure Note    Patient: Brenda Dennison  MRN: [id number]  YOB: 1950  Date of evaluation: 12/21/2021  Time:  4:33 PM     Procedure Summary     Date: 12/21/21 Room / Location: [address] [place] [address] Cath Lab    Anesthesia Start: 6933 Anesthesia Stop: 1628    Procedure: [facility] EP A FLUT W ANESTH Diagnosis:       Unspecified atrial flutter      S/P ablation of atrial flutter    Scheduled Providers:  Responsible Provider: Katlyn Balbuena DO    Anesthesia Type: general ASA Status: 3          Anesthesia Type: general    Desire Phase I: Desire Score: 10    Desire Phase II:      Last vitals: Reviewed and per EMR flowsheets.        Anesthesia Post Evaluation    Patient location during evaluation: bedside  Patient participation: complete - patient participated  Level of consciousness: awake and awake and alert  Pain score: 0  Airway patency: patent  Nausea & Vomiting: nausea and no vomiting  Complications: no  Cardiovascular status: hemodynamically stable  Respiratory status: acceptable  Hydration status: euvolemic  Comments: Given additional dose of zofran prior to departure from eps lab

## 2021-12-21 NOTE — ANESTHESIA PROCEDURE NOTES
Arterial Line:    An arterial line was placed using surface landmarks, in the procedure area for the following indication(s): continuous blood pressure monitoring and blood sampling needed. A 20 gauge (size), 1 and 3/4 inch (length), Arrow (type) catheter was placed, Seldinger technique not used, into the left radial artery, secured by tape and Tegaderm. Anesthesia type: General    Events:  patient tolerated procedure well with no complications.   12/21/2021 2:15 [id number] 2:25 PM  Anesthesiologist: Jeri Miller DO  Resident/CRNA: VIN Han CRNA  Preanesthetic Checklist  Completed: patient identified, IV checked, site marked, risks and benefits discussed, surgical consent, monitors and equipment checked, pre-op evaluation, timeout performed, anesthesia consent given, oxygen available and patient being monitored

## 2021-12-21 NOTE — H&P
Electrophysiology H&p  Note      Reason for consultation:  Atrial flutter    Chief complaint :  Palpitations    Referring physician: Dr. Betsy Jauregui      Primary care physician: Thirza Riedel, APRN - CNP      History of Present Illness: Today visit (12/21/21)    Patient here for atrial flutter ablation. No change in H&P noted from previous clinic visit. Previous visit:   Patient is 71-year-old male with a history of hypertension, hyperlipidemia referred by Dr. Betsy Jauregui for atrial flutter. Patient reports that recently he went to the emergency room with chest pain and palpitations and was noted to be in atrial flutter. Patient was referred to me for further management. Patient reports he does not feel any symptoms now but when he had the arrhythmia he was very symptomatic. Patient denies drinking alcohol or smoking. Patient reports drinking coffee every morning. Patient does exercise      Pastmedical history:   Past Medical History:   Diagnosis Date    Basal cell carcinoma of skin     RIGHT ANTERIOR SHOULDER    Benign prostatic hyperplasia     Chronic sinusitis     GERD (gastroesophageal reflux disease)     H/O seasonal allergies     Low back pain     Pancreatic abnormality     INCREASED PANCREATIC LIPASE    S/P discectomy for herniated nucleus pulposus     C6-C7       Surgical history :   Past Surgical History:   Procedure Laterality Date    APPENDECTOMY      COLONOSCOPY  4/10/15    diverticulosis, colon polyps, internal hemorrhoids    KNEE SURGERY Bilateral     Scopes of both knees.  TONGUE SURGERY      Exploratory of lump under tongue. Normal tissue.     VASECTOMY  05/11/1995       Family history:   Family History   Problem Relation Age of Onset    Emphysema Mother     Breast Cancer Mother     Other Father         agent orange issues    Pacemaker Father     Cancer Other         FEMALE FAMILY MEMBER - UTERINE    No Known Problems Sister     No Known Problems Brother     No Known Problems Brother        Social history :  reports that he quit smoking about 36 years ago. His smoking use included cigarettes. He started smoking about 49 years ago. He has a 13.00 pack-year smoking history. He has never used smokeless tobacco. He reports current alcohol use. He reports that he does not use drugs. Allergies   Allergen Reactions    Other      Chloraseptic- sores on tongue    Codeine Nausea And Vomiting    Pcn [Penicillins] Rash       No current facility-administered medications on file prior to encounter. Current Outpatient Medications on File Prior to Encounter   Medication Sig Dispense Refill    losartan (COZAAR) 100 MG tablet Take 1 tablet by mouth daily 90 tablet 3    apixaban (ELIQUIS) 5 MG TABS tablet Take 1 tablet by mouth 2 times daily 28 tablet 0    dilTIAZem (CARDIZEM CD) 120 MG extended release capsule Take 1 capsule by mouth daily 30 capsule 0    ezetimibe (ZETIA) 10 MG tablet Take 1 tablet by mouth daily 30 tablet 3    naproxen (NAPROSYN) 500 MG tablet Take 1 tablet by mouth 2 times daily as needed (joint pain) 180 tablet 1    omeprazole (PRILOSEC) 40 MG delayed release capsule Take 1 capsule by mouth daily 90 capsule 1    Ascorbic Acid (VITAMIN C) 250 MG tablet Take 250 mg by mouth daily      Multiple Vitamins-Minerals (HAIR SKIN AND NAILS FORMULA PO) Take 1 tablet by mouth daily      fexofenadine (ALLEGRA) 180 MG tablet Take 180 mg by mouth as needed.  sildenafil (VIAGRA) 100 MG tablet Take 1 tablet by mouth daily as needed for Erectile Dysfunction 30 tablet 1       Review of Systems:   Review of Systems   Constitutional: Positive for fatigue. Negative for activity change, chills and fever. HENT: Negative for congestion, ear pain and tinnitus. Eyes: Negative for photophobia, pain and visual disturbance. Respiratory: Negative for cough, chest tightness, shortness of breath and wheezing.     Cardiovascular: Positive for chest pain and palpitations. Negative for leg swelling. Gastrointestinal: Negative for abdominal pain, blood in stool, constipation, diarrhea, nausea and vomiting. Endocrine: Negative for cold intolerance and heat intolerance. Genitourinary: Negative for dysuria, flank pain and hematuria. Musculoskeletal: Positive for arthralgias. Negative for back pain, myalgias and neck stiffness. Skin: Negative for color change and rash. Allergic/Immunologic: Negative for food allergies. Neurological: Negative for dizziness, light-headedness, numbness and headaches. Hematological: Does not bruise/bleed easily. Psychiatric/Behavioral: Negative for agitation, behavioral problems and confusion. Examination:      Vitals:    12/21/21 0951   BP: (!) 143/95   Pulse: 74   Resp: 13   Temp: 96.8 °F (36 °C)   TempSrc: Temporal   SpO2: 95%   Weight: 245 lb (111.1 kg)   Height: 5' 9\" (1.753 m)        Body mass index is 36.18 kg/m². Physical Exam  Constitutional:       Appearance: Normal appearance. He is not ill-appearing. HENT:      Head: Normocephalic and atraumatic. Mouth/Throat:      Mouth: Mucous membranes are moist.   Eyes:      Conjunctiva/sclera: Conjunctivae normal.   Cardiovascular:      Rate and Rhythm: Normal rate and regular rhythm. Heart sounds: No murmur heard. Pulmonary:      Effort: Pulmonary effort is normal.      Breath sounds: No rales. Abdominal:      General: Abdomen is flat. Palpations: Abdomen is soft. Musculoskeletal:         General: No tenderness. Normal range of motion. Cervical back: Normal range of motion. Right lower leg: No edema. Left lower leg: No edema. Skin:     General: Skin is warm and dry. Neurological:      General: No focal deficit present. Mental Status: He is alert and oriented to person, place, and time.            CBC:   Lab Results   Component Value Date    WBC 10.5 12/16/2021    HGB 14.8 12/16/2021    HCT 45.0 12/16/2021     12/16/2021     Lipids:  Lab Results   Component Value Date    CHOL 164 10/01/2021    TRIG 53 10/01/2021    HDL 73 (H) 10/01/2021    LDLCALC 80 10/01/2021    LDLDIRECT 115 (H) 09/17/2020     PT/INR:   Lab Results   Component Value Date    INR 1.18 12/16/2021        BMP:    Lab Results   Component Value Date     12/16/2021    K 4.6 12/16/2021     12/16/2021    CO2 27 12/16/2021    BUN 17 12/16/2021     CMP:   Lab Results   Component Value Date    AST 19 11/26/2021    PROT 7.0 11/26/2021    BILITOT 0.2 11/26/2021    ALKPHOS 79 11/26/2021     TSH:  No results found for: TSH    EKGINTERPRETATION - EKG Interpretation:  Sinus rhythm       IMPRESSION / RECOMMENDATIONS:     Atrial flutter paroxysmal  HTN  HLD  Obesity BMI 36    Patient had appears to have atrial flutter with RVR. Patient had an episode. Patient is getting stress test.      Discussed with the patient about atrial flutter pathophysiology. If it is truly atrial flutter and atrial flutter ablation has high success rate and may be can come off anticoagulation if he has no arrhythmia like A. fib in future. Discussed with the patient that patients with atrial flutter can have atrial fibrillation higher risk    The risks including, but not limited to, vascular injury, bleeding, infection, radiation exposure, injury to cardiac and surrounding structures (including esophageal and pulmonary vein injury), injury to the normal conduction system of the heart (possibly requiring a pacemaker), stroke, myocardial infarction and death were all discussed. The patient considered the risks, benefits and alternatives; decided to proceed with the procedure. Patient wants to proceed with ablation.   Patient does have a stress test scheduled and if the stress test is normal then we will schedule for ablation    Scheduled for atrial flutter ablation so that may be can come off anticoagulation if it is truly atypical atrial flutter and has no arrhythmia in future    On EP study if he can induce atrial flutter and then will study it and can ablate at the same time. If it had any other arrhythmias induced then will ablate the other arrhythmia. If not inducible which can happen at times under anesthesia we will perform the CTI ablation for typical flutter    Discussed with the patient about weight loss. Discussed with the patient avoid caffeine and alcohol (which patient does not drink)      Thanks again for allowing me to participate in care of this patient. Please call me if you have any questions. With best regards. Luis Alberto Augustine MD, 12/21/2021 2:11 PM     Please note this report has been partially produced using speech recognition software and may contain errors related to that system including errors in grammar, punctuation, and spelling, as well as words and phrases that may be inappropriate. If there are any questions or concerns please feel free to contact the dictating provider for clarification.

## 2021-12-21 NOTE — ANESTHESIA PRE PROCEDURE
C44.91    Chronic sinusitis J32.9    S/P discectomy for herniated nucleus pulposus Z98.890, Z87.39    Benign prostatic hyperplasia N40.0    Essential hypertension I10    Class 2 obesity due to excess calories with body mass index (BMI) of 36.0 to 36.9 in adult E66.09, Z68.36    Other hyperlipidemia E78.49    Non-seasonal allergic rhinitis due to pollen J30.1    Primary osteoarthritis of both knees M17.0    Other male erectile dysfunction N52.8    Atrial flutter by electrocardiogram (HCC) I48.92    S/P ablation of atrial flutter Z98.890, Z86.79       Past Medical History:        Diagnosis Date    Basal cell carcinoma of skin     RIGHT ANTERIOR SHOULDER    Benign prostatic hyperplasia     Chronic sinusitis     GERD (gastroesophageal reflux disease)     H/O seasonal allergies     Low back pain     Pancreatic abnormality     INCREASED PANCREATIC LIPASE    S/P discectomy for herniated nucleus pulposus     C6-C7       Past Surgical History:        Procedure Laterality Date    APPENDECTOMY      COLONOSCOPY  4/10/15    diverticulosis, colon polyps, internal hemorrhoids    KNEE SURGERY Bilateral     Scopes of both knees.  TONGUE SURGERY      Exploratory of lump under tongue. Normal tissue.     VASECTOMY  1995       Social History:    Social History     Tobacco Use    Smoking status: Former Smoker     Packs/day: 1.00     Years: 13.00     Pack years: 13.00     Types: Cigarettes     Start date: 1972     Quit date: 1985     Years since quittin.3    Smokeless tobacco: Never Used   Substance Use Topics    Alcohol use: Yes     Comment: two mixed drinks 5 days a week                                Counseling given: Not Answered      Vital Signs (Current):   Vitals:    21 0951   BP: (!) 143/95   Pulse: 74   Resp: 13   Temp: 36 °C (96.8 °F)   TempSrc: Temporal   SpO2: 95%   Weight: 245 lb (111.1 kg)   Height: 5' 9\" (1.753 m)                                              BP Readings from Last 3 Encounters:   12/21/21 (!) 143/95   12/15/21 124/76   12/13/21 (!) 150/80       NPO Status:                                                                                 BMI:   Wt Readings from Last 3 Encounters:   12/21/21 245 lb (111.1 kg)   12/13/21 245 lb (111.1 kg)   12/03/21 243 lb 6.4 oz (110.4 kg)     Body mass index is 36.18 kg/m². CBC:   Lab Results   Component Value Date    WBC 10.5 12/16/2021    RBC 4.90 12/16/2021    HGB 15.6 12/21/2021    HCT 46.0 12/21/2021    MCV 91.8 12/16/2021    RDW 12.2 12/16/2021     12/16/2021       CMP:   Lab Results   Component Value Date     12/21/2021    K 4.0 12/21/2021     12/16/2021    CO2 28 12/21/2021    BUN 17 12/16/2021    CREATININE 0.8 12/21/2021    CREATININE 0.8 12/16/2021    GFRAA >60 12/16/2021    AGRATIO 1.7 10/01/2021    LABGLOM >60 12/16/2021    GLUCOSE 125 12/16/2021    PROT 7.0 11/26/2021    CALCIUM 9.1 12/16/2021    BILITOT 0.2 11/26/2021    ALKPHOS 79 11/26/2021    AST 19 11/26/2021    ALT 22 11/26/2021       POC Tests:   Recent Labs     12/21/21  1430   POCGLU 91   POCCL 105       Coags:   Lab Results   Component Value Date    PROTIME 15.2 12/16/2021    INR 1.18 12/16/2021    APTT 35.9 12/16/2021       HCG (If Applicable): No results found for: PREGTESTUR, PREGSERUM, HCG, HCGQUANT     ABGs:   Lab Results   Component Value Date    PO2ART 263.7 12/21/2021    SJV8QYP 40.6 12/21/2021    AFQ1EOD 26.5 12/21/2021        Type & Screen (If Applicable):  No results found for: LABABO, LABRH    Drug/Infectious Status (If Applicable):  No results found for: HIV, HEPCAB    COVID-19 Screening (If Applicable):   Lab Results   Component Value Date    COVID19 NOT DETECTED 12/15/2021           Anesthesia Evaluation   history of anesthetic complications (S/P discectomy for herniated nucleus pulposus C6-C7 ):    Airway: Mallampati: II  TM distance: >3 FB   Neck ROM: full  Mouth opening: > = 3 FB Dental: normal exam         Pulmonary:normal exam                              ROS comment: Former smoker   Cardiovascular:    (+) hypertension:, dysrhythmias: atrial flutter,         Rhythm: irregular             Beta Blocker:  Not on Beta Blocker         Neuro/Psych:   Negative Neuro/Psych ROS              GI/Hepatic/Renal:   (+) GERD:, morbid obesity          Endo/Other:    (+) blood dyscrasia: anticoagulation therapy, arthritis: OA., malignancy/cancer (skin). Abdominal:   (+) obese,           Vascular: negative vascular ROS. Other Findings:             Anesthesia Plan      general     ASA 3       Induction: intravenous. arterial line  MIPS: Prophylactic antiemetics administered. Anesthetic plan and risks discussed with patient. Pre Anesthesia Assessment complete. Chart reviewed on 12/21/2021. This is a chart review only.       VIN Amador - CRNA   12/21/2021

## 2021-12-21 NOTE — OP NOTE
[facility]                                                                                               Electrophysiology      Procedure:     1 . Comprehensive electrophysiologic evaluation including insertion and repositioning of multiple electrode catheters with induction or attempted  induction of an arrhythmia with right atrial pacing and recording, right ventricular pacing and recording, His bundle recording, with intracardiac catheter ablation of typical cavotricuspid dependent isthmus dependent atrial flutter (SVT ablation)  2. Left atrial pacing and recording from coronary sinus  3. Intracardiac electrophysiologic three-dimensional mapping      Attending: Endy Thurman MD    Referring physician:     Complications: None    Estimated blood loss: 10 cc    Anesthesia: General anesthesia by anesthesia    Medications used for induction/testing: none    Other medications: None    History: Patient with hx of documented atrial flutter here for EP study and atrial flutter ablation     Procedure in detail:  The patient was brought to the electrophysiology laboratory in stable condition. Patient was in a fasting, non-sedated state. The risks, benefits and alternatives of the procedure were discussed with the patient in details. The risks including, but not limited to, vascular injury, bleeding, infection, radiation exposure, injury to cardiac and surrounding structures, injury to the normal conduction system of the heart, stroke, myocardial infarction and death were all discussed. The patient considered the various treatment options and decided to proceed with the EP study and attempted ablation procedure. Written informed consent was obtained and placed on the chart. A time-out protocol was completed to confirm the identity of the patient and the procedure to be performed.     The patient was prepped and draped in a sterile fashion. Lidocaine was administered to the right and left groin. Using a modified Seldinger technique, venous access was obtained and sheaths were placed as detailed below. Catheters were then placed under fluoroscopic guidance as detailed below. Sheath and Catheter Placement:    1. Right femoral vein: Sheath size: 8F/ 8.5 F Ramp sheath Catheter type: J curve Meek 8mm non irrigated Ablation catheter Catheter Location: Cavo-Tricuspid isthmus  2. Right femoral vein: Sheath size: 6F Catheter type: Quadripolar Catheter Location: Right ventricle / His  3. Left femoral vein: Sheath size: 7F Catheter type: Decapolar catheter Catheter Location: Coronary sinus  4. Left femoral vein: Sheath size: 11F If ICE catheter is needed          Baseline parameters (ms):    WA interval: 176  QRS duration: 100  QT interval:431  Baseline cycle length: 760    AH interval: 85   HV interval: 42    LA pacing and recording was performed by CS catheter    Sinus node function was normal    Concentric activation was noted - antegrade in sinus rhythm and on retrograde pacing of ventricle. Para-hisian pacing performed did not show any obvious pathway. Atrioventricular ej function:  Incremental pacing was performed from the proximal coronary sinus and right ventricular apex and the antegrade and retrograde atrioventricular (AV) Wenckebach cycle length was determined. Antegrade AV Wenckebach was 340 ms. Retrograde AV Wenckebach was 280 ms. Single atrial extrastimuli were delivered following drivetrains of 478YS from the proximal CS and the AV ej effective refractory period (ERP) was determined. Evidence of dual AV ej pathways was not seen. AVN ERP:   600ms/ 300ms    The atrial ERP:  600 ms / 260 msec     Ventricular function    Single ventricular extrastimuli were delivered following drivetrains of 573KM from the right ventricular apex and the ventricular ERP was determined.      VAERP  600ms/less

## 2021-12-22 VITALS
OXYGEN SATURATION: 95 % | WEIGHT: 245 LBS | RESPIRATION RATE: 16 BRPM | HEART RATE: 77 BPM | DIASTOLIC BLOOD PRESSURE: 83 MMHG | SYSTOLIC BLOOD PRESSURE: 139 MMHG | TEMPERATURE: 97.5 F | HEIGHT: 69 IN | BODY MASS INDEX: 36.29 KG/M2

## 2021-12-22 LAB
ANION GAP SERPL CALCULATED.3IONS-SCNC: 14 MMOL/L (ref 4–16)
BUN BLDV-MCNC: 18 MG/DL (ref 6–23)
CALCIUM SERPL-MCNC: 8.9 MG/DL (ref 8.3–10.6)
CHLORIDE BLD-SCNC: 105 MMOL/L (ref 99–110)
CO2: 22 MMOL/L (ref 21–32)
CREAT SERPL-MCNC: 0.8 MG/DL (ref 0.9–1.3)
EKG ATRIAL RATE: 78 BPM
EKG DIAGNOSIS: NORMAL
EKG P AXIS: 18 DEGREES
EKG P-R INTERVAL: 146 MS
EKG Q-T INTERVAL: 396 MS
EKG QRS DURATION: 92 MS
EKG QTC CALCULATION (BAZETT): 448 MS
EKG R AXIS: -24 DEGREES
EKG T AXIS: -6 DEGREES
EKG VENTRICULAR RATE: 77 BPM
GFR AFRICAN AMERICAN: >60 ML/MIN/1.73M2
GFR NON-AFRICAN AMERICAN: >60 ML/MIN/1.73M2
GLUCOSE BLD-MCNC: 137 MG/DL (ref 70–99)
GLUCOSE BLD-MCNC: 152 MG/DL (ref 70–99)
MAGNESIUM: 2.5 MG/DL (ref 1.8–2.4)
POTASSIUM SERPL-SCNC: 4.3 MMOL/L (ref 3.5–5.1)
SODIUM BLD-SCNC: 141 MMOL/L (ref 135–145)

## 2021-12-22 PROCEDURE — 6370000000 HC RX 637 (ALT 250 FOR IP): Performed by: NURSE PRACTITIONER

## 2021-12-22 PROCEDURE — 93010 ELECTROCARDIOGRAM REPORT: CPT | Performed by: INTERNAL MEDICINE

## 2021-12-22 PROCEDURE — 80048 BASIC METABOLIC PNL TOTAL CA: CPT

## 2021-12-22 PROCEDURE — 83735 ASSAY OF MAGNESIUM: CPT

## 2021-12-22 PROCEDURE — 82962 GLUCOSE BLOOD TEST: CPT

## 2021-12-22 PROCEDURE — 99217 PR OBSERVATION CARE DISCHARGE MANAGEMENT: CPT | Performed by: NURSE PRACTITIONER

## 2021-12-22 RX ORDER — DILTIAZEM HYDROCHLORIDE 120 MG/1
120 CAPSULE, COATED, EXTENDED RELEASE ORAL DAILY
Qty: 90 CAPSULE | Refills: 3 | Status: SHIPPED | OUTPATIENT
Start: 2021-12-22 | End: 2022-05-16 | Stop reason: SDUPTHER

## 2021-12-22 RX ADMIN — DILTIAZEM HYDROCHLORIDE 120 MG: 120 CAPSULE, COATED, EXTENDED RELEASE ORAL at 08:48

## 2021-12-22 RX ADMIN — RIVAROXABAN 20 MG: 20 TABLET, FILM COATED ORAL at 08:48

## 2021-12-22 RX ADMIN — PANTOPRAZOLE SODIUM 40 MG: 40 TABLET, DELAYED RELEASE ORAL at 07:36

## 2021-12-22 RX ADMIN — CETIRIZINE HYDROCHLORIDE 10 MG: 10 TABLET, FILM COATED ORAL at 08:49

## 2021-12-22 RX ADMIN — LOSARTAN POTASSIUM 50 MG: 50 TABLET, FILM COATED ORAL at 08:50

## 2021-12-22 RX ADMIN — EZETIMIBE 10 MG: 10 TABLET ORAL at 08:49

## 2021-12-22 ASSESSMENT — PAIN SCALES - GENERAL
PAINLEVEL_OUTOF10: 0

## 2021-12-22 NOTE — PROGRESS NOTES
Pt and wife given discharge instructions, both voiced understanding. 1100 pt escorted to main entrance via wheelchair for discharge.

## 2021-12-22 NOTE — DISCHARGE SUMMARY
[facility]  Discharge Summary    Keily Saldaña  :  1950  MRN:  [id number]    Admit date:  2021  Discharge date:      Admitting Physician:  Luis Alberto Augustine MD    Discharge Diagnoses:      1. Sp Ablation of Atrial flutter  2. ELISA  3. Hypertension  4. Obesity         Patient Active Problem List   Diagnosis    GERD (gastroesophageal reflux disease)    Low back pain    Basal cell carcinoma of skin    Chronic sinusitis    S/P discectomy for herniated nucleus pulposus    Benign prostatic hyperplasia    Essential hypertension    Class 2 obesity due to excess calories with body mass index (BMI) of 36.0 to 36.9 in adult    Other hyperlipidemia    Non-seasonal allergic rhinitis due to pollen    Primary osteoarthritis of both knees    Other male erectile dysfunction    Atrial flutter by electrocardiogram ([address]    S/P ablation of atrial flutter       Admission Condition:  fair    Discharged Condition:  good    Hospital Course:   Patient admitted post ELISA and ablation of bidirectional cavotricuspid isthmus block for treatment of presumed typical atrial flutter for observation. Patient tolerated the procedure and post procedure stay was uneventful. Patient was stable for discharge to be followed as an out patient.     Incentive spirometry instruction given and recommended to perform for one week      Discharge Medications:  See discharge medication list :   Eliquis was changed to Xarelto    Discharge Exam:  General appearance: alert, appears stated age and cooperative  Head: Normocephalic, without obvious abnormality, atraumatic  Lungs: clear to auscultation bilaterally  Heart: regular rate and rhythm, S1, S2 normal, no murmur, click, rub or gallop  Extremities: extremities normal, atraumatic, no cyanosis or edema, bilateral groin sites free of hematoma or ecchymosis  Pulses: 2+ and symmetric  Skin: warm and dry   Neurologic: alert and oriented    Disposition:   home    Signed:  VIN Parson - CNP  12/22/2021, 8:24 AM

## 2022-01-03 ENCOUNTER — OFFICE VISIT (OUTPATIENT)
Dept: CARDIOLOGY CLINIC | Age: 72
End: 2022-01-03
Payer: MEDICARE

## 2022-01-03 VITALS
HEIGHT: 69 IN | SYSTOLIC BLOOD PRESSURE: 132 MMHG | WEIGHT: 239.6 LBS | HEART RATE: 71 BPM | DIASTOLIC BLOOD PRESSURE: 80 MMHG | BODY MASS INDEX: 35.49 KG/M2

## 2022-01-03 DIAGNOSIS — Z98.890 S/P ABLATION OF ATRIAL FLUTTER: Primary | ICD-10-CM

## 2022-01-03 DIAGNOSIS — E66.01 CLASS 2 SEVERE OBESITY DUE TO EXCESS CALORIES WITH SERIOUS COMORBIDITY AND BODY MASS INDEX (BMI) OF 36.0 TO 36.9 IN ADULT (HCC): ICD-10-CM

## 2022-01-03 DIAGNOSIS — I10 ESSENTIAL HYPERTENSION: ICD-10-CM

## 2022-01-03 DIAGNOSIS — Z86.79 S/P ABLATION OF ATRIAL FLUTTER: Primary | ICD-10-CM

## 2022-01-03 PROCEDURE — G8417 CALC BMI ABV UP PARAM F/U: HCPCS | Performed by: NURSE PRACTITIONER

## 2022-01-03 PROCEDURE — 1036F TOBACCO NON-USER: CPT | Performed by: NURSE PRACTITIONER

## 2022-01-03 PROCEDURE — G8427 DOCREV CUR MEDS BY ELIG CLIN: HCPCS | Performed by: NURSE PRACTITIONER

## 2022-01-03 PROCEDURE — 3017F COLORECTAL CA SCREEN DOC REV: CPT | Performed by: NURSE PRACTITIONER

## 2022-01-03 PROCEDURE — 99214 OFFICE O/P EST MOD 30 MIN: CPT | Performed by: NURSE PRACTITIONER

## 2022-01-03 PROCEDURE — 4040F PNEUMOC VAC/ADMIN/RCVD: CPT | Performed by: NURSE PRACTITIONER

## 2022-01-03 PROCEDURE — 93000 ELECTROCARDIOGRAM COMPLETE: CPT | Performed by: NURSE PRACTITIONER

## 2022-01-03 PROCEDURE — G8484 FLU IMMUNIZE NO ADMIN: HCPCS | Performed by: NURSE PRACTITIONER

## 2022-01-03 PROCEDURE — 1123F ACP DISCUSS/DSCN MKR DOCD: CPT | Performed by: NURSE PRACTITIONER

## 2022-01-03 ASSESSMENT — ENCOUNTER SYMPTOMS
SHORTNESS OF BREATH: 0
ABDOMINAL DISTENTION: 0
CHEST TIGHTNESS: 0
COUGH: 0
SINUS PAIN: 0
SINUS PRESSURE: 0
BACK PAIN: 0
CONSTIPATION: 0
EYE ITCHING: 0
DIARRHEA: 0
EYE PAIN: 0
BLOOD IN STOOL: 0
ABDOMINAL PAIN: 0
NAUSEA: 0
VOMITING: 0
WHEEZING: 0
COLOR CHANGE: 0

## 2022-01-03 NOTE — PATIENT INSTRUCTIONS
Please be informed that if you contact our office outside of normal business hours the physician on call cannot help with any scheduling or rescheduling issues, procedure instruction questions or any type of medication issue. We advise you for any urgent/emergency that you go to the nearest emergency room! PLEASE CALL OUR OFFICE DURING NORMAL BUSINESS HOURS    Monday - Friday   8 am to 5 pm    [place]: [address]: [phone]    [place]:  [phone]    **It is YOUR responsibilty to bring medication bottles and/or updated medication list to [address] [place] [address].  This will allow us to better serve you and all your healthcare needs**

## 2022-01-03 NOTE — PROGRESS NOTES
Electrophysiology  Follow up Note      Reason for consultation:  Atrial flutter    Chief complaint: 1 week follow-up status post ablation of atrial flutter. Denies cardiac complaints    Referring physician: Dr. Clarence Mckeon      Primary care physician: VIN Padilla CNP      History of Present Illness: This visit 1/3/2022  Patient here for 1 week follow-up status post ablation of atrial flutter. He reports that he has been feeling well since the ablation. He denies chest pain, palpitations, shortness of breath, lightheadedness, dizziness, edema or syncope. He is still taking Eliquis 5 mg twice daily and will transition to Xarelto 20 mg daily once the Eliquis prescription has been completed. Patient reports he is not drinking alcohol. He states that he has switched to half-and-half coffee and will gradually transition off of caffeine. He would like to stop Cardizem at some point. Previous visit:   Patient is 46-year-old male with a history of hypertension, hyperlipidemia referred by Dr. Clarence Mckeon for atrial flutter. Patient reports that recently he went to the emergency room with chest pain and palpitations and was noted to be in atrial flutter. Patient was referred to me for further management. Patient reports he does not feel any symptoms now but when he had the arrhythmia he was very symptomatic. Patient denies drinking alcohol or smoking. Patient reports drinking coffee every morning.   Patient does exercise      Pastmedical history:   Past Medical History:   Diagnosis Date    Basal cell carcinoma of skin     RIGHT ANTERIOR SHOULDER    Benign prostatic hyperplasia     Chronic sinusitis     GERD (gastroesophageal reflux disease)     H/O seasonal allergies     Low back pain     Pancreatic abnormality     INCREASED PANCREATIC LIPASE    S/P discectomy for herniated nucleus pulposus     C6-C7       Surgical history :   Past Surgical History: Procedure Laterality Date    ABLATION OF DYSRHYTHMIC FOCUS  12/21/2021    atrial flutter ablation    APPENDECTOMY      COLONOSCOPY  04/10/2015    diverticulosis, colon polyps, internal hemorrhoids    KNEE SURGERY Bilateral     Scopes of both knees.  TONGUE SURGERY      Exploratory of lump under tongue. Normal tissue.  VASECTOMY  05/11/1995       Family history:   Family History   Problem Relation Age of Onset    Emphysema Mother     Breast Cancer Mother     Other Father         agent orange issues    Pacemaker Father     Cancer Other         FEMALE FAMILY MEMBER - UTERINE    No Known Problems Sister     No Known Problems Brother     No Known Problems Brother        Social history :  reports that he quit smoking about 36 years ago. His smoking use included cigarettes. He started smoking about 49 years ago. He has a 13.00 pack-year smoking history. He has never used smokeless tobacco. He reports current alcohol use. He reports that he does not use drugs.     Allergies   Allergen Reactions    Other      Chloraseptic- sores on tongue    Codeine Nausea And Vomiting    Pcn [Penicillins] Rash       Current Outpatient Medications on File Prior to Visit   Medication Sig Dispense Refill    dilTIAZem (CARDIZEM CD) 120 MG extended release capsule Take 1 capsule by mouth daily 90 capsule 3    rivaroxaban (XARELTO) 20 MG TABS tablet Take 1 tablet by mouth daily 90 tablet 0    losartan (COZAAR) 100 MG tablet Take 1 tablet by mouth daily 90 tablet 3    dilTIAZem (CARDIZEM CD) 120 MG extended release capsule Take 1 capsule by mouth daily 30 capsule 0    sildenafil (VIAGRA) 100 MG tablet Take 1 tablet by mouth daily as needed for Erectile Dysfunction 30 tablet 1    ezetimibe (ZETIA) 10 MG tablet Take 1 tablet by mouth daily 30 tablet 3    naproxen (NAPROSYN) 500 MG tablet Take 1 tablet by mouth 2 times daily as needed (joint pain) 180 tablet 1    omeprazole (PRILOSEC) 40 MG delayed release capsule Take 1 capsule by mouth daily 90 capsule 1    Ascorbic Acid (VITAMIN C) 250 MG tablet Take 250 mg by mouth daily      Multiple Vitamins-Minerals (HAIR SKIN AND NAILS FORMULA PO) Take 1 tablet by mouth daily      fexofenadine (ALLEGRA) 180 MG tablet Take 180 mg by mouth as needed. No current facility-administered medications on file prior to visit. Review of Systems:   Review of Systems   Constitutional: Negative for activity change, chills, fatigue and fever. HENT: Negative for congestion, sinus pressure and sinus pain. Eyes: Negative for pain, itching and visual disturbance. Respiratory: Negative for cough, chest tightness, shortness of breath and wheezing. Cardiovascular: Negative for chest pain, palpitations and leg swelling. Gastrointestinal: Negative for abdominal distention, abdominal pain, blood in stool, constipation, diarrhea, nausea and vomiting. Endocrine: Negative for cold intolerance and heat intolerance. Genitourinary: Negative for difficulty urinating, dysuria, flank pain and hematuria. Musculoskeletal: Positive for arthralgias. Negative for back pain, myalgias and neck stiffness. Skin: Negative for color change, rash and wound. Allergic/Immunologic: Negative for food allergies. Neurological: Negative for dizziness, syncope, light-headedness, numbness and headaches. Hematological: Does not bruise/bleed easily. Psychiatric/Behavioral: Negative for agitation, behavioral problems and confusion. The patient is not nervous/anxious. Examination:      Vitals:    01/03/22 0916   BP: 132/80   Pulse: 71   Weight: 239 lb 9.6 oz (108.7 kg)   Height: 5' 9\" (1.753 m)        Body mass index is 35.38 kg/m². Physical Exam  Constitutional:       Appearance: Normal appearance. He is not ill-appearing. HENT:      Head: Normocephalic and atraumatic. Right Ear: External ear normal.      Left Ear: External ear normal.      Nose: No congestion or rhinorrhea. Mouth/Throat:      Pharynx: No oropharyngeal exudate or posterior oropharyngeal erythema. Eyes:      General:         Right eye: No discharge. Left eye: No discharge. Conjunctiva/sclera: Conjunctivae normal.   Cardiovascular:      Rate and Rhythm: Normal rate and regular rhythm. Heart sounds: Normal heart sounds. No murmur heard. Pulmonary:      Effort: Pulmonary effort is normal.      Breath sounds: Normal breath sounds. No wheezing or rhonchi. Abdominal:      General: Abdomen is flat. Palpations: Abdomen is soft. Musculoskeletal:         General: Normal range of motion. Cervical back: Normal range of motion. Right lower leg: No edema. Left lower leg: No edema. Skin:     General: Skin is warm and dry. Comments: Bilateral femoral groin site soft nontender no hematoma   Neurological:      General: No focal deficit present. Mental Status: He is alert and oriented to person, place, and time. Psychiatric:         Mood and Affect: Mood normal.         Thought Content:  Thought content normal.           CBC:   Lab Results   Component Value Date    WBC 10.5 12/16/2021    HGB 15.6 12/21/2021    HCT 46.0 12/21/2021     12/16/2021     Lipids:  Lab Results   Component Value Date    CHOL 164 10/01/2021    TRIG 53 10/01/2021    HDL 73 (H) 10/01/2021    LDLCALC 80 10/01/2021    LDLDIRECT 115 (H) 09/17/2020     PT/INR:   Lab Results   Component Value Date    INR 1.18 12/16/2021        BMP:    Lab Results   Component Value Date     12/22/2021    K 4.3 12/22/2021     12/22/2021    CO2 22 12/22/2021    BUN 18 12/22/2021     CMP:   Lab Results   Component Value Date    AST 19 11/26/2021    PROT 7.0 11/26/2021    BILITOT 0.2 11/26/2021    ALKPHOS 79 11/26/2021     TSH:     EKG Interpretation:  Sinus rhythm       IMPRESSION / RECOMMENDATIONS:     status post ablation of atrial flutter  HTN  Obesity BMI 36    EKG obtained and reviewed patient noted to be in sinus rhythm with a heart rate of 71  Patient denies any further episodes of palpitations or tachycardia. Patient will transition to Xarelto 20 mg daily when his Eliquis prescription has been completed  Patient to continue Cardizem  mg daily  Patient is slowly stopping caffeine as he is concerned about headaches  Patient would like to eventually come off Cardizem and blood thinner  I discussed with patient that we would get through the 3-month blanking period first before making any medication changes. Patient voiced understanding. Patient is leaving for [place] and will not be returning until April. Discussed with patient that he could have intermittent episodes of palpitations or tachycardia  Patient states that he is still going to [place] and he will be back for a follow-up in May    Vitals:    01/03/22 0916   BP: 132/80   Pulse: 71     Blood pressure is stable. Continue losartan 50 mg daily. Patient reports that his med list indicates he takes losartan 100 mg daily however he states that he cuts the pill in half    Reinforced exercise and lifestyle changes for management of obesity      Thanks again for allowing me to participate in care of this patient. Please call me if you have any questions. With best regards. VIN Barkley CNP, 1/3/2022 9:34 AM     Please note this report has been partially produced using speech recognition software and may contain errors related to that system including errors in grammar, punctuation, and spelling, as well as words and phrases that may be inappropriate. If there are any questions or concerns please feel free to contact the dictating provider for clarification.

## 2022-03-24 DIAGNOSIS — Z86.79 S/P ABLATION OF ATRIAL FLUTTER: Primary | ICD-10-CM

## 2022-03-24 DIAGNOSIS — Z98.890 S/P ABLATION OF ATRIAL FLUTTER: Primary | ICD-10-CM

## 2022-03-25 ENCOUNTER — TELEPHONE (OUTPATIENT)
Dept: CARDIOLOGY CLINIC | Age: 72
End: 2022-03-25

## 2022-03-25 NOTE — TELEPHONE ENCOUNTER
Patient called concerned about the cost of Xarelto. He has a deductible of around $400 and then will pay around $100 a month for each refill.  (Eliquis, Pradaxa and Savaysa were all higher tier and cost). Patient would like to know if he could go off of Xarelto and take Aspirin daily when he completes his current supply of Xarelto. His wife who is an RN advised that she purchased a heart monitor and the patient has had no pain and no arrhythmias. Advised that we would discuss with Dr. Inessa Bailey when he returns. Call to Dr. Jd Rodriguez nurse, [facility] RN. Patient advised that he may not hear back from our office until on or around 4/4/22.

## 2022-03-30 NOTE — TELEPHONE ENCOUNTER
Discussed with Dr Dubose Going and per Dr Dubose Going recommending patient remain on xarelto until he is able to follow up in the office and have event monitor applied to assess for any arrhythmia. Patient agreeable and voiced understanding. Did ask that patient call this office with any further questions or concerns. Patient voiced understanding.

## 2022-04-05 ENCOUNTER — TELEPHONE (OUTPATIENT)
Dept: CARDIOLOGY CLINIC | Age: 72
End: 2022-04-05

## 2022-04-05 DIAGNOSIS — Z98.890 S/P ABLATION OF ATRIAL FLUTTER: ICD-10-CM

## 2022-04-05 DIAGNOSIS — Z86.79 S/P ABLATION OF ATRIAL FLUTTER: ICD-10-CM

## 2022-04-05 NOTE — TELEPHONE ENCOUNTER
Pt in [facility] has about 8 pills left needing enough samples to get him though until he returns for appt. 5/3/22. States grandson can  and get them to the patient.  Please call to inform if available

## 2022-04-05 NOTE — TELEPHONE ENCOUNTER
Pt in [facility] has about 8 pills left of Xeralto needing enough samples to get him though until he returns for appt. 5/3/22. States grandson can  and get them to the patient.  Please call to inform if available

## 2022-05-03 ENCOUNTER — NURSE ONLY (OUTPATIENT)
Dept: CARDIOLOGY CLINIC | Age: 72
End: 2022-05-03
Payer: MEDICARE

## 2022-05-03 ENCOUNTER — OFFICE VISIT (OUTPATIENT)
Dept: CARDIOLOGY CLINIC | Age: 72
End: 2022-05-03
Payer: MEDICARE

## 2022-05-03 VITALS
WEIGHT: 240.2 LBS | HEART RATE: 75 BPM | SYSTOLIC BLOOD PRESSURE: 138 MMHG | BODY MASS INDEX: 34.39 KG/M2 | HEIGHT: 70 IN | DIASTOLIC BLOOD PRESSURE: 78 MMHG

## 2022-05-03 DIAGNOSIS — I48.91 ATRIAL FIBRILLATION, UNSPECIFIED TYPE (HCC): Primary | ICD-10-CM

## 2022-05-03 DIAGNOSIS — I10 ESSENTIAL HYPERTENSION: ICD-10-CM

## 2022-05-03 DIAGNOSIS — Z98.890 S/P ABLATION OF ATRIAL FLUTTER: ICD-10-CM

## 2022-05-03 DIAGNOSIS — Z86.79 S/P ABLATION OF ATRIAL FLUTTER: Primary | ICD-10-CM

## 2022-05-03 DIAGNOSIS — Z86.79 S/P ABLATION OF ATRIAL FLUTTER: ICD-10-CM

## 2022-05-03 DIAGNOSIS — Z98.890 S/P ABLATION OF ATRIAL FLUTTER: Primary | ICD-10-CM

## 2022-05-03 PROCEDURE — 99214 OFFICE O/P EST MOD 30 MIN: CPT | Performed by: NURSE PRACTITIONER

## 2022-05-03 PROCEDURE — 1036F TOBACCO NON-USER: CPT | Performed by: NURSE PRACTITIONER

## 2022-05-03 PROCEDURE — G8427 DOCREV CUR MEDS BY ELIG CLIN: HCPCS | Performed by: NURSE PRACTITIONER

## 2022-05-03 PROCEDURE — G8417 CALC BMI ABV UP PARAM F/U: HCPCS | Performed by: NURSE PRACTITIONER

## 2022-05-03 PROCEDURE — 3017F COLORECTAL CA SCREEN DOC REV: CPT | Performed by: NURSE PRACTITIONER

## 2022-05-03 PROCEDURE — 4040F PNEUMOC VAC/ADMIN/RCVD: CPT | Performed by: NURSE PRACTITIONER

## 2022-05-03 PROCEDURE — 93228 REMOTE 30 DAY ECG REV/REPORT: CPT | Performed by: INTERNAL MEDICINE

## 2022-05-03 PROCEDURE — 1123F ACP DISCUSS/DSCN MKR DOCD: CPT | Performed by: NURSE PRACTITIONER

## 2022-05-03 PROCEDURE — 93000 ELECTROCARDIOGRAM COMPLETE: CPT | Performed by: NURSE PRACTITIONER

## 2022-05-03 RX ORDER — FAMOTIDINE 10 MG
TABLET ORAL NIGHTLY
COMMUNITY

## 2022-05-03 ASSESSMENT — ENCOUNTER SYMPTOMS
BACK PAIN: 0
SINUS PAIN: 0
VOMITING: 0
EYE PAIN: 0
BLOOD IN STOOL: 0
DIARRHEA: 0
EYE ITCHING: 0
ABDOMINAL DISTENTION: 0
SHORTNESS OF BREATH: 0
ABDOMINAL PAIN: 0
COLOR CHANGE: 0
WHEEZING: 0
CONSTIPATION: 0
CHEST TIGHTNESS: 0
COUGH: 0
NAUSEA: 0
SINUS PRESSURE: 0

## 2022-05-03 NOTE — PROGRESS NOTES
Electrophysiology  Follow up Note      Reason for consultation:  Atrial flutter    Chief complaint:  3 month follow-up status post ablation of atrial flutter. Denies cardiac complaints    Referring physician: Dr. Karlene Barrera      Primary care physician: VIN Smith CNP      History of Present Illness: This visit 5/3/2022  Patient is here today for 3 month follow up s/p ablation of atrial flutter. Patient reports that he is feeling well. He denies chest pain, palpitations, shortness of breath, lightheadedness, dizziness, edema or syncope. He states that he has been very active and has not had any symptoms. He spends the winter in [place] [address]. He just returned 2 days ago. Previous visit 1/3/2022  Patient here for 1 week follow-up status post ablation of atrial flutter. He reports that he has been feeling well since the ablation. He denies chest pain, palpitations, shortness of breath, lightheadedness, dizziness, edema or syncope. He is still taking Eliquis 5 mg twice daily and will transition to Xarelto 20 mg daily once the Eliquis prescription has been completed. Patient reports he is not drinking alcohol. He states that he has switched to half-and-half coffee and will gradually transition off of caffeine. He would like to stop Cardizem at some point. Previous visit:   Patient is 63-year-old male with a history of hypertension, hyperlipidemia referred by Dr. Karlene Barrera for atrial flutter. Patient reports that recently he went to the emergency room with chest pain and palpitations and was noted to be in atrial flutter. Patient was referred to me for further management. Patient reports he does not feel any symptoms now but when he had the arrhythmia he was very symptomatic. Patient denies drinking alcohol or smoking. Patient reports drinking coffee every morning.   Patient does exercise      Pastmedical history:   Past Medical History:   Diagnosis Date    Basal cell carcinoma of skin     RIGHT ANTERIOR SHOULDER    Benign prostatic hyperplasia     Chronic sinusitis     GERD (gastroesophageal reflux disease)     H/O seasonal allergies     Low back pain     Pancreatic abnormality     INCREASED PANCREATIC LIPASE    S/P discectomy for herniated nucleus pulposus     C6-C7       Surgical history :   Past Surgical History:   Procedure Laterality Date    ABLATION OF DYSRHYTHMIC FOCUS  12/21/2021    atrial flutter ablation    APPENDECTOMY      COLONOSCOPY  04/10/2015    diverticulosis, colon polyps, internal hemorrhoids    KNEE SURGERY Bilateral     Scopes of both knees.  TONGUE SURGERY      Exploratory of lump under tongue. Normal tissue.  VASECTOMY  05/11/1995       Family history:   Family History   Problem Relation Age of Onset    Emphysema Mother     Breast Cancer Mother     Other Father         agent orange issues    Pacemaker Father     Cancer Other         FEMALE FAMILY MEMBER - UTERINE    No Known Problems Sister     No Known Problems Brother     No Known Problems Brother        Social history :  reports that he quit smoking about 36 years ago. His smoking use included cigarettes. He started smoking about 49 years ago. He has a 13.00 pack-year smoking history. He has never used smokeless tobacco. He reports current alcohol use. He reports that he does not use drugs.     Allergies   Allergen Reactions    Other      Chloraseptic- sores on tongue    Codeine Nausea And Vomiting    Pcn [Penicillins] Rash       Current Outpatient Medications on File Prior to Visit   Medication Sig Dispense Refill    famotidine (PEPCID) 10 MG tablet Take by mouth at bedtime Unsure of dosage      rivaroxaban (XARELTO) 20 MG TABS tablet Take 1 tablet by mouth daily 30 tablet 1    dilTIAZem (CARDIZEM CD) 120 MG extended release capsule Take 1 capsule by mouth daily 90 capsule 3    losartan (COZAAR) 100 MG tablet Take 1 tablet by mouth daily (Patient taking differently: Take 50 mg by mouth daily ) 90 tablet 3    sildenafil (VIAGRA) 100 MG tablet Take 1 tablet by mouth daily as needed for Erectile Dysfunction 30 tablet 1    ezetimibe (ZETIA) 10 MG tablet Take 1 tablet by mouth daily 30 tablet 3    Ascorbic Acid (VITAMIN C) 250 MG tablet Take 250 mg by mouth daily      fexofenadine (ALLEGRA) 180 MG tablet Take 180 mg by mouth as needed.  naproxen (NAPROSYN) 500 MG tablet Take 1 tablet by mouth 2 times daily as needed (joint pain) (Patient not taking: Reported on 5/3/2022) 180 tablet 1    omeprazole (PRILOSEC) 40 MG delayed release capsule Take 1 capsule by mouth daily (Patient not taking: Reported on 5/3/2022) 90 capsule 1    Multiple Vitamins-Minerals (HAIR SKIN AND NAILS FORMULA PO) Take 1 tablet by mouth daily (Patient not taking: Reported on 5/3/2022)       No current facility-administered medications on file prior to visit. Review of Systems:   Review of Systems   Constitutional: Negative for activity change, chills, fatigue and fever. HENT: Negative for congestion, sinus pressure and sinus pain. Eyes: Negative for pain, itching and visual disturbance. Respiratory: Negative for cough, chest tightness, shortness of breath and wheezing. Cardiovascular: Negative for chest pain, palpitations and leg swelling. Gastrointestinal: Negative for abdominal distention, abdominal pain, blood in stool, constipation, diarrhea, nausea and vomiting. Endocrine: Negative for cold intolerance and heat intolerance. Genitourinary: Negative for difficulty urinating, dysuria, flank pain and hematuria. Musculoskeletal: Positive for arthralgias. Negative for back pain, myalgias and neck stiffness. Skin: Negative for color change, rash and wound. Allergic/Immunologic: Negative for food allergies. Neurological: Negative for dizziness, syncope, light-headedness, numbness and headaches. Hematological: Does not bruise/bleed easily. Psychiatric/Behavioral: Negative for agitation, behavioral problems and confusion. The patient is not nervous/anxious. Examination:      Vitals:    05/03/22 0855   BP: 138/78   Site: Left Upper Arm   Position: Sitting   Cuff Size: Large Adult   Pulse: 75   Weight: 240 lb 3.2 oz (109 kg)   Height: 5' 10\" (1.778 m)        Body mass index is 34.47 kg/m². Physical Exam  Constitutional:       Appearance: Normal appearance. He is not ill-appearing. HENT:      Head: Normocephalic and atraumatic. Right Ear: External ear normal.      Left Ear: External ear normal.      Nose: No congestion or rhinorrhea. Mouth/Throat:      Pharynx: No oropharyngeal exudate or posterior oropharyngeal erythema. Eyes:      General:         Right eye: No discharge. Left eye: No discharge. Conjunctiva/sclera: Conjunctivae normal.   Cardiovascular:      Rate and Rhythm: Normal rate and regular rhythm. Heart sounds: Normal heart sounds. No murmur heard. Pulmonary:      Effort: Pulmonary effort is normal.      Breath sounds: Normal breath sounds. No wheezing or rhonchi. Abdominal:      General: Abdomen is flat. Palpations: Abdomen is soft. Musculoskeletal:         General: Normal range of motion. Cervical back: Normal range of motion. Right lower leg: No edema. Left lower leg: No edema. Skin:     General: Skin is warm and dry. Neurological:      General: No focal deficit present. Mental Status: He is alert and oriented to person, place, and time. Psychiatric:         Mood and Affect: Mood normal.         Thought Content:  Thought content normal.           CBC:   Lab Results   Component Value Date    WBC 10.5 12/16/2021    HGB 15.6 12/21/2021    HCT 46.0 12/21/2021     12/16/2021     Lipids:  Lab Results   Component Value Date    CHOL 164 10/01/2021    TRIG 53 10/01/2021    HDL 73 (H) 10/01/2021    LDLCALC 80 10/01/2021    LDLDIRECT 115 (H) 09/17/2020 PT/INR:   Lab Results   Component Value Date    INR 1.18 12/16/2021        BMP:    Lab Results   Component Value Date     12/22/2021    K 4.3 12/22/2021     12/22/2021    CO2 22 12/22/2021    BUN 18 12/22/2021     CMP:   Lab Results   Component Value Date    AST 19 11/26/2021    PROT 7.0 11/26/2021    BILITOT 0.2 11/26/2021    ALKPHOS 79 11/26/2021     TSH:     EKG Interpretation:  Sinus rhythm       IMPRESSION / RECOMMENDATIONS:     status post ablation of atrial flutter  HTN  Obesity BMI 36    EKG obtained and reviewed patient noted to be in sinus rhythm with a heart rate of 75  Patient denies any further episodes of palpitations or tachycardia. Will get 30 day event monitor to rule out any further arrhythmias before stopping AC  Patient to continue Cardizem  mg daily  Patient is not using alcohol or caffeine. Vitals:    05/03/22 0855   BP: 138/78   Pulse: 75     Blood pressure is stable. Continue losartan 50 mg daily. Reinforced exercise and lifestyle changes for management of obesity    HF/LV dysfunction No (0)   HTN   Yes (1)  Age greater /equal 75 No (0)   DM No (0)   Stroke/TIA/TE No (0)   Vascular Disease No (0)   Age 74-69  Yes (1)  Sex Male  (0)     Chads score of 2  Continue anticoagulation    Will get 30 day event monitor with follow up in 6 weeks. Thanks again for allowing me to participate in care of this patient. Please call me if you have any questions. With best regards. VIN Willoughby CNP, 5/3/2022 9:03 AM     Please note this report has been partially produced using speech recognition software and may contain errors related to that system including errors in grammar, punctuation, and spelling, as well as words and phrases that may be inappropriate. If there are any questions or concerns please feel free to contact the dictating provider for clarification.

## 2022-05-03 NOTE — PROGRESS NOTES
30 days e-cardio monitor placed.  # [id number]. Instructed patient on how to record the event and to call monitoring center at [phone] if any problems arise. Instructed patient to disconnect the lead wires from the electrodes before bathing or showering and reattach them afterwards. Instructed patient that the electrodes should be changed every 3 days or if they no longer adhere to the skin. Patient to mail package after the monitor has ended. Patient verbalized understanding.

## 2022-05-13 ENCOUNTER — TELEPHONE (OUTPATIENT)
Dept: CARDIOLOGY CLINIC | Age: 72
End: 2022-05-13

## 2022-05-13 DIAGNOSIS — R31.9 HEMATURIA, UNSPECIFIED TYPE: Primary | ICD-10-CM

## 2022-05-13 NOTE — TELEPHONE ENCOUNTER
Discussed with Nba Stein CNP. Per Nba Stein, patient to continue to hold Xarelto. Also recommending patient consult Urology to be evaluated. Also paitent to continue wearing cardiac event monitor. Spoke with patient and he is aware. Discussed urology consult and Dr Hay Freeman mentioned as patient states son has seen this provider before so referral initiated to Dr Hay Freeman. Did ask patient to follow up after seeing urology. Patient currently scheduled for follow up 6/14/2022. Will reevaluate follow up once evaluated by Urology. Patient aware and voiced understanding. Did ask that patient call this office with any further questions or concerns. Patient again voiced understanding.

## 2022-05-13 NOTE — TELEPHONE ENCOUNTER
Patient's wife called stating that patient is taking Xarelto and is urinating blood as of this morning, please call her back at ph# [phone].

## 2022-05-13 NOTE — TELEPHONE ENCOUNTER
Returned phone call and spoke with patients wife, Grisel Fofana, who states patient has had blood tinged urine x3 starting at 3:00 am this morning. Denies any other symptoms such as pain with urination, but does report that patient c/o some slight burning yesterday afternoon after working out in the yard for a while. Denies any previous urination/prostate issues. Did advise patient to hold xarelto. Explained will discuss this with Dr Castro Sandoval and return phone call once advises. Grisel Fofana voiced understanding. Did ask that they contact the office with any further questions or concerns. Grisel Fofana again voiced understanding.

## 2022-05-13 NOTE — TELEPHONE ENCOUNTER
Consult request faxed to Dr Ellen Toscano office [phone]. Also gave patient Dr Thanh Lazar office number of [phone].

## 2022-05-16 RX ORDER — DILTIAZEM HYDROCHLORIDE 120 MG/1
120 CAPSULE, COATED, EXTENDED RELEASE ORAL DAILY
Qty: 90 CAPSULE | Refills: 3 | Status: SHIPPED | OUTPATIENT
Start: 2022-05-16 | End: 2023-04-25

## 2022-06-14 ENCOUNTER — OFFICE VISIT (OUTPATIENT)
Dept: CARDIOLOGY CLINIC | Age: 72
End: 2022-06-14
Payer: MEDICARE

## 2022-06-14 VITALS
BODY MASS INDEX: 34.69 KG/M2 | HEART RATE: 72 BPM | HEIGHT: 69 IN | WEIGHT: 234.2 LBS | DIASTOLIC BLOOD PRESSURE: 78 MMHG | SYSTOLIC BLOOD PRESSURE: 136 MMHG

## 2022-06-14 DIAGNOSIS — Z98.890 S/P ABLATION OF ATRIAL FLUTTER: Primary | ICD-10-CM

## 2022-06-14 DIAGNOSIS — R31.9 HEMATURIA, UNSPECIFIED TYPE: ICD-10-CM

## 2022-06-14 DIAGNOSIS — Z86.79 S/P ABLATION OF ATRIAL FLUTTER: Primary | ICD-10-CM

## 2022-06-14 DIAGNOSIS — I10 ESSENTIAL HYPERTENSION: Chronic | ICD-10-CM

## 2022-06-14 DIAGNOSIS — I48.3 TYPICAL ATRIAL FLUTTER (HCC): ICD-10-CM

## 2022-06-14 PROCEDURE — G8417 CALC BMI ABV UP PARAM F/U: HCPCS | Performed by: NURSE PRACTITIONER

## 2022-06-14 PROCEDURE — 1123F ACP DISCUSS/DSCN MKR DOCD: CPT | Performed by: NURSE PRACTITIONER

## 2022-06-14 PROCEDURE — G8427 DOCREV CUR MEDS BY ELIG CLIN: HCPCS | Performed by: NURSE PRACTITIONER

## 2022-06-14 PROCEDURE — 1036F TOBACCO NON-USER: CPT | Performed by: NURSE PRACTITIONER

## 2022-06-14 PROCEDURE — 99214 OFFICE O/P EST MOD 30 MIN: CPT | Performed by: NURSE PRACTITIONER

## 2022-06-14 PROCEDURE — 3017F COLORECTAL CA SCREEN DOC REV: CPT | Performed by: NURSE PRACTITIONER

## 2022-06-14 RX ORDER — ASPIRIN 81 MG/1
81 TABLET ORAL DAILY
Qty: 90 TABLET | Refills: 1
Start: 2022-06-14

## 2022-06-14 ASSESSMENT — ENCOUNTER SYMPTOMS
CHEST TIGHTNESS: 0
BACK PAIN: 0
SINUS PRESSURE: 0
VOMITING: 0
SHORTNESS OF BREATH: 0
WHEEZING: 0
SINUS PAIN: 0
ABDOMINAL DISTENTION: 0
DIARRHEA: 0
CONSTIPATION: 0
ABDOMINAL PAIN: 0
COUGH: 0
NAUSEA: 0
COLOR CHANGE: 0
BLOOD IN STOOL: 0
EYE ITCHING: 0
EYE PAIN: 0

## 2022-06-14 NOTE — PROGRESS NOTES
Electrophysiology  Follow up Note      Reason for consultation:  Atrial flutter    Chief complaint:  Follow up on event monitor and atrial flutter    Referring physician: Dr. Fili Cabrera      Primary care physician: VIN Field CNP      History of Present Illness: This visit 6/14/2022  Patient here today for follow-up on event monitor status post ablation of atrial flutter. Patient reports he is feeling well. He denies chest pain, palpitations, shortness of breath, lightheadedness, dizziness, edema or syncope. He is drinking 2 cups of coffee daily. He also reports that he is drinking some alcohol. Patient did not follow-up with Dr. Sofia Perry due to to once stopping Xarelto he did not have any further episodes of hematuria. Previous visit 5/3/2022  Patient is here today for 3 month follow up s/p ablation of atrial flutter. Patient reports that he is feeling well. He denies chest pain, palpitations, shortness of breath, lightheadedness, dizziness, edema or syncope. He states that he has been very active and has not had any symptoms. He spends the winter in [place] [address]. He just returned 2 days ago. Previous visit 1/3/2022  Patient here for 1 week follow-up status post ablation of atrial flutter. He reports that he has been feeling well since the ablation. He denies chest pain, palpitations, shortness of breath, lightheadedness, dizziness, edema or syncope. He is still taking Eliquis 5 mg twice daily and will transition to Xarelto 20 mg daily once the Eliquis prescription has been completed. Patient reports he is not drinking alcohol. He states that he has switched to half-and-half coffee and will gradually transition off of caffeine. He would like to stop Cardizem at some point. Previous visit:   Patient is 60-year-old male with a history of hypertension, hyperlipidemia referred by Dr. Fili Cabrera for atrial flutter.   Patient reports that recently he went to the emergency room with chest pain and palpitations and was noted to be in atrial flutter. Patient was referred to me for further management. Patient reports he does not feel any symptoms now but when he had the arrhythmia he was very symptomatic. Patient denies drinking alcohol or smoking. Patient reports drinking coffee every morning. Patient does exercise      Pastmedical history:   Past Medical History:   Diagnosis Date    Basal cell carcinoma of skin     RIGHT ANTERIOR SHOULDER    Benign prostatic hyperplasia     Chronic sinusitis     GERD (gastroesophageal reflux disease)     H/O seasonal allergies     Low back pain     Pancreatic abnormality     INCREASED PANCREATIC LIPASE    S/P discectomy for herniated nucleus pulposus     C6-C7       Surgical history :   Past Surgical History:   Procedure Laterality Date    ABLATION OF DYSRHYTHMIC FOCUS  12/21/2021    atrial flutter ablation    APPENDECTOMY      COLONOSCOPY  04/10/2015    diverticulosis, colon polyps, internal hemorrhoids    KNEE SURGERY Bilateral     Scopes of both knees.  TONGUE SURGERY      Exploratory of lump under tongue. Normal tissue.  VASECTOMY  05/11/1995       Family history:   Family History   Problem Relation Age of Onset    Emphysema Mother     Breast Cancer Mother     Other Father         agent orange issues    Pacemaker Father     Cancer Other         FEMALE FAMILY MEMBER - UTERINE    No Known Problems Sister     No Known Problems Brother     No Known Problems Brother        Social history :  reports that he quit smoking about 36 years ago. His smoking use included cigarettes. He started smoking about 49 years ago. He has a 13.00 pack-year smoking history. He has never used smokeless tobacco. He reports current alcohol use. He reports that he does not use drugs.     Allergies   Allergen Reactions    Other      Chloraseptic- sores on tongue    Codeine Nausea And Vomiting    Pcn [Penicillins] Rash       Current Outpatient Medications on File Prior to Visit   Medication Sig Dispense Refill    dilTIAZem (CARDIZEM CD) 120 MG extended release capsule Take 1 capsule by mouth daily 90 capsule 3    famotidine (PEPCID) 10 MG tablet Take by mouth at bedtime Unsure of dosage      losartan (COZAAR) 100 MG tablet Take 1 tablet by mouth daily (Patient taking differently: Take 50 mg by mouth daily ) 90 tablet 3    sildenafil (VIAGRA) 100 MG tablet Take 1 tablet by mouth daily as needed for Erectile Dysfunction 30 tablet 1    ezetimibe (ZETIA) 10 MG tablet Take 1 tablet by mouth daily 30 tablet 3    naproxen (NAPROSYN) 500 MG tablet Take 1 tablet by mouth 2 times daily as needed (joint pain) 180 tablet 1    Ascorbic Acid (VITAMIN C) 250 MG tablet Take 250 mg by mouth daily      Multiple Vitamins-Minerals (HAIR SKIN AND NAILS FORMULA PO) Take 1 tablet by mouth daily       fexofenadine (ALLEGRA) 180 MG tablet Take 180 mg by mouth as needed. No current facility-administered medications on file prior to visit. Review of Systems:   Review of Systems   Constitutional: Negative for activity change, chills, fatigue and fever. HENT: Negative for congestion, sinus pressure and sinus pain. Eyes: Negative for pain, itching and visual disturbance. Respiratory: Negative for cough, chest tightness, shortness of breath and wheezing. Cardiovascular: Negative for chest pain, palpitations and leg swelling. Gastrointestinal: Negative for abdominal distention, abdominal pain, blood in stool, constipation, diarrhea, nausea and vomiting. Endocrine: Negative for cold intolerance and heat intolerance. Genitourinary: Positive for hematuria. Negative for difficulty urinating, dysuria and flank pain. Musculoskeletal: Positive for arthralgias. Negative for back pain, myalgias and neck stiffness. Skin: Negative for color change, rash and wound. Allergic/Immunologic: Negative for food allergies.    Neurological: Negative for dizziness, syncope, light-headedness, numbness and headaches. Hematological: Does not bruise/bleed easily. Psychiatric/Behavioral: Negative for agitation, behavioral problems and confusion. The patient is not nervous/anxious. Examination:      Vitals:    06/14/22 0858   BP: 136/78   Pulse: 72   Weight: 234 lb 3.2 oz (106.2 kg)   Height: 5' 9\" (1.753 m)        Body mass index is 34.59 kg/m². Physical Exam  Constitutional:       Appearance: Normal appearance. He is not ill-appearing. HENT:      Head: Normocephalic and atraumatic. Right Ear: External ear normal.      Left Ear: External ear normal.      Nose: No congestion or rhinorrhea. Mouth/Throat:      Pharynx: No oropharyngeal exudate or posterior oropharyngeal erythema. Eyes:      General:         Right eye: No discharge. Left eye: No discharge. Conjunctiva/sclera: Conjunctivae normal.   Cardiovascular:      Rate and Rhythm: Normal rate and regular rhythm. Heart sounds: Normal heart sounds. No murmur heard. Pulmonary:      Effort: Pulmonary effort is normal.      Breath sounds: Normal breath sounds. No wheezing or rhonchi. Abdominal:      General: Abdomen is flat. Palpations: Abdomen is soft. Musculoskeletal:         General: Normal range of motion. Cervical back: Normal range of motion. Right lower leg: No edema. Left lower leg: No edema. Skin:     General: Skin is warm and dry. Neurological:      General: No focal deficit present. Mental Status: He is alert and oriented to person, place, and time. Psychiatric:         Mood and Affect: Mood normal.         Thought Content:  Thought content normal.           CBC:   Lab Results   Component Value Date    WBC 10.5 12/16/2021    HGB 15.6 12/21/2021    HCT 46.0 12/21/2021     12/16/2021     Lipids:  Lab Results   Component Value Date    CHOL 164 10/01/2021    TRIG 53 10/01/2021    HDL 73 (H) 10/01/2021    LDLCALC 80 10/01/2021    LDLDIRECT 115 (H) 09/17/2020     PT/INR:   Lab Results   Component Value Date    INR 1.18 12/16/2021        BMP:    Lab Results   Component Value Date     12/22/2021    K 4.3 12/22/2021     12/22/2021    CO2 22 12/22/2021    BUN 18 12/22/2021     CMP:   Lab Results   Component Value Date    AST 19 11/26/2021    PROT 7.0 11/26/2021    BILITOT 0.2 11/26/2021    ALKPHOS 79 11/26/2021     TSH:     EKG Interpretation:  Sinus rhythm       IMPRESSION / RECOMMENDATIONS:     status post ablation of atrial flutter  Hematuria  HTN  Obesity BMI 36    Event monitor reviewed. Patient had 1 episode of apparent conduction. Patient asymptomatic. Patient had called complaining of hematuria. Patient advised to follow-up with urology. Patient did stop his Xarelto on his own and the hematuria resolved. Given no episodes of atrial fibrillation or atrial flutter and low CHADS score of 2 for hypertension and age we will stop Xarelto. Patient to take aspirin 81 mg daily  Dr Prerna Hernández is aware of plan. Patient to continue Cardizem  mg daily  Patient advised to stop alcohol and caffeine      Vitals:    06/14/22 0858   BP: 136/78   Pulse: 72     Blood pressure is stable. Continue losartan 50 mg daily. Reinforced exercise and lifestyle changes for management of obesity    Patient to follow-up in 6 months    Thanks again for allowing me to participate in care of this patient. Please call me if you have any questions. With best regards. VIN Torres CNP, 6/14/2022 9:51 AM     Please note this report has been partially produced using speech recognition software and may contain errors related to that system including errors in grammar, punctuation, and spelling, as well as words and phrases that may be inappropriate. If there are any questions or concerns please feel free to contact the dictating provider for clarification.

## 2022-11-28 ENCOUNTER — HOSPITAL ENCOUNTER (OUTPATIENT)
Dept: GENERAL RADIOLOGY | Age: 72
Discharge: HOME | End: 2022-11-28
Payer: MEDICARE

## 2022-11-28 ENCOUNTER — HOSPITAL ENCOUNTER (OUTPATIENT)
Age: 72
Discharge: HOME | End: 2022-11-28
Payer: MEDICARE

## 2022-11-28 DIAGNOSIS — M53.3 CHRONIC LEFT SACROILIAC JOINT PAIN: ICD-10-CM

## 2022-11-28 DIAGNOSIS — G89.29 CHRONIC LEFT SACROILIAC JOINT PAIN: ICD-10-CM

## 2022-11-28 PROCEDURE — 72120 X-RAY BEND ONLY L-S SPINE: CPT

## 2022-11-28 PROCEDURE — 73502 X-RAY EXAM HIP UNI 2-3 VIEWS: CPT

## 2022-11-28 PROCEDURE — 72100 X-RAY EXAM L-S SPINE 2/3 VWS: CPT

## 2022-12-16 ENCOUNTER — OFFICE VISIT (OUTPATIENT)
Dept: CARDIOLOGY CLINIC | Age: 72
End: 2022-12-16

## 2022-12-16 VITALS
WEIGHT: 239 LBS | OXYGEN SATURATION: 96 % | SYSTOLIC BLOOD PRESSURE: 150 MMHG | HEART RATE: 74 BPM | BODY MASS INDEX: 34.22 KG/M2 | DIASTOLIC BLOOD PRESSURE: 72 MMHG | HEIGHT: 70 IN

## 2022-12-16 DIAGNOSIS — Z86.79 S/P ABLATION OF ATRIAL FLUTTER: Primary | ICD-10-CM

## 2022-12-16 DIAGNOSIS — Z98.890 S/P ABLATION OF ATRIAL FLUTTER: Primary | ICD-10-CM

## 2022-12-16 DIAGNOSIS — I10 ESSENTIAL HYPERTENSION: ICD-10-CM

## 2022-12-16 ASSESSMENT — ENCOUNTER SYMPTOMS
WHEEZING: 0
BLOOD IN STOOL: 0
SINUS PRESSURE: 0
CONSTIPATION: 0
SHORTNESS OF BREATH: 0
COLOR CHANGE: 0
ABDOMINAL DISTENTION: 0
NAUSEA: 0
EYE ITCHING: 0
CHEST TIGHTNESS: 0
COUGH: 0
BACK PAIN: 0
VOMITING: 0
EYE PAIN: 0
SINUS PAIN: 0
DIARRHEA: 0
ABDOMINAL PAIN: 0

## 2022-12-16 NOTE — PATIENT INSTRUCTIONS
Please be informed that if you contact our office outside of normal business hours the physician on call cannot help with any scheduling or rescheduling issues, procedure instruction questions or any type of medication issue. We advise you for any urgent/emergency that you go to the nearest emergency room! PLEASE CALL OUR OFFICE DURING NORMAL BUSINESS HOURS    Monday - Friday   8 am to 5 pm    [place]: [address] [address]: [phone]    [place]:  [phone]    **It is YOUR responsibilty to bring medication bottles and/or updated medication list to [address] [place] [address].  This will allow us to better serve you and all your healthcare needs**

## 2022-12-16 NOTE — PROGRESS NOTES
the Eliquis prescription has been completed. Patient reports he is not drinking alcohol. He states that he has switched to half-and-half coffee and will gradually transition off of caffeine. He would like to stop Cardizem at some point. Previous visit:   Patient is 57-year-old male with a history of hypertension, hyperlipidemia referred by Dr. Abelino Celestin for atrial flutter. Patient reports that recently he went to the emergency room with chest pain and palpitations and was noted to be in atrial flutter. Patient was referred to me for further management. Patient reports he does not feel any symptoms now but when he had the arrhythmia he was very symptomatic. Patient denies drinking alcohol or smoking. Patient reports drinking coffee every morning. Patient does exercise      Pastmedical history:   Past Medical History:   Diagnosis Date    Basal cell carcinoma of skin     RIGHT ANTERIOR SHOULDER    Benign prostatic hyperplasia     Chronic sinusitis     GERD (gastroesophageal reflux disease)     H/O seasonal allergies     Low back pain     Pancreatic abnormality     INCREASED PANCREATIC LIPASE    S/P discectomy for herniated nucleus pulposus     C6-C7       Surgical history :   Past Surgical History:   Procedure Laterality Date    ABLATION OF DYSRHYTHMIC FOCUS  12/21/2021    atrial flutter ablation    APPENDECTOMY      COLONOSCOPY  04/10/2015    diverticulosis, colon polyps, internal hemorrhoids    KNEE SURGERY Bilateral     Scopes of both knees. TONGUE SURGERY      Exploratory of lump under tongue. Normal tissue.     VASECTOMY  05/11/1995       Family history:   Family History   Problem Relation Age of Onset    Emphysema Mother     Breast Cancer Mother     Other Father         agent orange issues    Pacemaker Father     Cancer Other         FEMALE FAMILY MEMBER - UTERINE    No Known Problems Sister     No Known Problems Brother     No Known Problems Brother        Social history :  reports that he quit smoking about 37 years ago. His smoking use included cigarettes. He started smoking about 50 years ago. He has a 13.00 pack-year smoking history. He has never used smokeless tobacco. He reports current alcohol use. He reports that he does not use drugs. Allergies   Allergen Reactions    Other      Chloraseptic- sores on tongue    Codeine Nausea And Vomiting    Pcn [Penicillins] Rash       Current Outpatient Medications on File Prior to Visit   Medication Sig Dispense Refill    aspirin EC 81 MG EC tablet Take 1 tablet by mouth daily 90 tablet 1    dilTIAZem (CARDIZEM CD) 120 MG extended release capsule Take 1 capsule by mouth daily 90 capsule 3    famotidine (PEPCID) 10 MG tablet Take by mouth at bedtime Unsure of dosage      losartan (COZAAR) 100 MG tablet Take 1 tablet by mouth daily (Patient taking differently: Take 50 mg by mouth daily) 90 tablet 3    ezetimibe (ZETIA) 10 MG tablet Take 1 tablet by mouth daily 30 tablet 3    naproxen (NAPROSYN) 500 MG tablet Take 1 tablet by mouth 2 times daily as needed (joint pain) 180 tablet 1    Ascorbic Acid (VITAMIN C) 250 MG tablet Take 250 mg by mouth daily      Multiple Vitamins-Minerals (HAIR SKIN AND NAILS FORMULA PO) Take 1 tablet by mouth daily       fexofenadine (ALLEGRA) 180 MG tablet Take 180 mg by mouth as needed. sildenafil (VIAGRA) 100 MG tablet Take 1 tablet by mouth daily as needed for Erectile Dysfunction (Patient not taking: Reported on 12/16/2022) 30 tablet 1     No current facility-administered medications on file prior to visit. Review of Systems:   Review of Systems   Constitutional:  Negative for activity change, chills, fatigue and fever. HENT:  Negative for congestion, sinus pressure and sinus pain. Eyes:  Negative for pain, itching and visual disturbance. Respiratory:  Negative for cough, chest tightness, shortness of breath and wheezing. Cardiovascular:  Negative for chest pain, palpitations and leg swelling. Gastrointestinal:  Negative for abdominal distention, abdominal pain, blood in stool, constipation, diarrhea, nausea and vomiting. Endocrine: Negative for cold intolerance and heat intolerance. Genitourinary:  Negative for difficulty urinating, dysuria, flank pain and hematuria. Musculoskeletal:  Positive for arthralgias. Negative for back pain, myalgias and neck stiffness. Skin:  Negative for color change, rash and wound. Allergic/Immunologic: Negative for food allergies. Neurological:  Negative for dizziness, syncope, light-headedness, numbness and headaches. Hematological:  Does not bruise/bleed easily. Psychiatric/Behavioral:  Negative for agitation, behavioral problems and confusion. The patient is not nervous/anxious. Examination:      Vitals:    12/16/22 1257 12/16/22 1302   BP: (!) 150/74 (!) 150/72   Site: Left Upper Arm Left Upper Arm   Position: Sitting Sitting   Cuff Size: Medium Adult Medium Adult   Pulse: 74    SpO2: 96%    Weight: 239 lb (108.4 kg)    Height: 5' 9.5\" (1.765 m)         Body mass index is 34.79 kg/m². Physical Exam  Constitutional:       Appearance: Normal appearance. He is not ill-appearing. HENT:      Head: Normocephalic and atraumatic. Right Ear: External ear normal.      Left Ear: External ear normal.      Nose: No congestion or rhinorrhea. Mouth/Throat:      Pharynx: No oropharyngeal exudate or posterior oropharyngeal erythema. Eyes:      General:         Right eye: No discharge. Left eye: No discharge. Conjunctiva/sclera: Conjunctivae normal.   Cardiovascular:      Rate and Rhythm: Normal rate and regular rhythm. Heart sounds: Normal heart sounds. No murmur heard. Pulmonary:      Effort: Pulmonary effort is normal.      Breath sounds: Normal breath sounds. No wheezing or rhonchi. Abdominal:      General: Abdomen is flat. Palpations: Abdomen is soft. Musculoskeletal:         General: Normal range of motion. Cervical back: Normal range of motion. Right lower leg: No edema. Left lower leg: No edema. Skin:     General: Skin is warm and dry. Neurological:      General: No focal deficit present. Mental Status: He is alert and oriented to person, place, and time. Psychiatric:         Mood and Affect: Mood normal.         Thought Content: Thought content normal.         CBC:   Lab Results   Component Value Date/Time    WBC 10.5 12/16/2021 09:07 AM    HGB 15.6 12/21/2021 02:30 PM    HCT 46.0 12/21/2021 02:30 PM     12/16/2021 09:07 AM     Lipids:  Lab Results   Component Value Date    CHOL 164 10/01/2021    TRIG 53 10/01/2021    HDL 73 (H) 10/01/2021    LDLCALC 80 10/01/2021    LDLDIRECT 115 (H) 09/17/2020     PT/INR:   Lab Results   Component Value Date/Time    INR 1.18 12/16/2021 09:07 AM        BMP:    Lab Results   Component Value Date     12/22/2021    K 4.3 12/22/2021     12/22/2021    CO2 22 12/22/2021    BUN 18 12/22/2021     CMP:   Lab Results   Component Value Date    AST 19 11/26/2021    PROT 7.0 11/26/2021    BILITOT 0.2 11/26/2021    ALKPHOS 79 11/26/2021     TSH:     EKG Interpretation:  Sinus rhythm       IMPRESSION / RECOMMENDATIONS:     status post ablation of atrial flutter  Hematuria  HTN  Obesity BMI 36    EKG obtained patient noted to be in sinus rhythm  Will continue Cardizem  mg daily  Patient has cut back on alcohol and caffeine and states this is helped him  Patient had hematuria on Xarelto. Xarelto was stopped and hematuria resolved. Patient to continue aspirin 81 mg daily as he had low CHADS2 score of 2      Vitals:    12/16/22 1302   BP: (!) 150/72   Pulse:    SpO2:    Patient has whitecoat syndrome  Blood pressure is stable. Continue losartan 50 mg daily.       Reinforced exercise and lifestyle changes for management of obesity    Will reestablish care with cardiology in 6 months or sooner if needed    Thanks again for allowing me to participate in care of this patient. Please call me if you have any questions. With best regards. Esteban Santos, VIN Timmons CNP, 12/16/2022 1:09 PM     Please note this report has been partially produced using speech recognition software and may contain errors related to that system including errors in grammar, punctuation, and spelling, as well as words and phrases that may be inappropriate. If there are any questions or concerns please feel free to contact the dictating provider for clarification.

## 2023-04-25 RX ORDER — DILTIAZEM HYDROCHLORIDE 120 MG/1
120 CAPSULE, COATED, EXTENDED RELEASE ORAL DAILY
Qty: 90 CAPSULE | Refills: 3 | Status: SHIPPED | OUTPATIENT
Start: 2023-04-25

## 2023-07-17 ENCOUNTER — OFFICE VISIT (OUTPATIENT)
Dept: CARDIOLOGY CLINIC | Age: 73
End: 2023-07-17
Payer: MEDICARE

## 2023-07-17 VITALS
HEIGHT: 70 IN | DIASTOLIC BLOOD PRESSURE: 80 MMHG | BODY MASS INDEX: 34.16 KG/M2 | SYSTOLIC BLOOD PRESSURE: 124 MMHG | WEIGHT: 238.6 LBS | HEART RATE: 79 BPM | OXYGEN SATURATION: 99 %

## 2023-07-17 DIAGNOSIS — I48.3 TYPICAL ATRIAL FLUTTER (HCC): Primary | ICD-10-CM

## 2023-07-17 PROCEDURE — 1123F ACP DISCUSS/DSCN MKR DOCD: CPT | Performed by: INTERNAL MEDICINE

## 2023-07-17 PROCEDURE — G8427 DOCREV CUR MEDS BY ELIG CLIN: HCPCS | Performed by: INTERNAL MEDICINE

## 2023-07-17 PROCEDURE — 3074F SYST BP LT 130 MM HG: CPT | Performed by: INTERNAL MEDICINE

## 2023-07-17 PROCEDURE — G8417 CALC BMI ABV UP PARAM F/U: HCPCS | Performed by: INTERNAL MEDICINE

## 2023-07-17 PROCEDURE — 1036F TOBACCO NON-USER: CPT | Performed by: INTERNAL MEDICINE

## 2023-07-17 PROCEDURE — 99214 OFFICE O/P EST MOD 30 MIN: CPT | Performed by: INTERNAL MEDICINE

## 2023-07-17 PROCEDURE — 3017F COLORECTAL CA SCREEN DOC REV: CPT | Performed by: INTERNAL MEDICINE

## 2023-07-17 PROCEDURE — 3079F DIAST BP 80-89 MM HG: CPT | Performed by: INTERNAL MEDICINE

## 2023-07-17 RX ORDER — DILTIAZEM HYDROCHLORIDE 120 MG/1
120 CAPSULE, COATED, EXTENDED RELEASE ORAL DAILY
Qty: 90 CAPSULE | Refills: 3 | Status: SHIPPED | OUTPATIENT
Start: 2023-07-17

## 2023-07-17 NOTE — PATIENT INSTRUCTIONS
We are committed to providing you the best care possible. If you receive a survey after visiting one of our offices, please take time to share your experience concerning your physician office visit. These surveys are confidential and no health information about you is shared. We are eager to improve for you and we are counting on your feedback to help make that happen. **It is YOUR responsibilty to bring medication bottles and/or updated medication list to [facility]. This will allow us to better serve you and all your healthcare needs**    Thank you for allowing us to care for you today! We want to ensure we can follow your treatment plan and we strive to give you the best outcomes and experience possible. If you ever have a life threatening emergency and call 911 - for an ambulance (EMS)   Our providers can only care for you at:   [facility] or [facility]. Even if you have someone take you or you drive yourself we can only care for you in a [facility] facility. Our providers are not setup at the other healthcare locations!

## 2023-11-28 ENCOUNTER — OFFICE VISIT (OUTPATIENT)
Dept: CARDIOLOGY CLINIC | Age: 73
End: 2023-11-28
Payer: MEDICARE

## 2023-11-28 VITALS
BODY MASS INDEX: 32.93 KG/M2 | HEIGHT: 70 IN | WEIGHT: 230 LBS | SYSTOLIC BLOOD PRESSURE: 132 MMHG | HEART RATE: 63 BPM | DIASTOLIC BLOOD PRESSURE: 72 MMHG | OXYGEN SATURATION: 97 %

## 2023-11-28 DIAGNOSIS — I10 ESSENTIAL HYPERTENSION: Chronic | ICD-10-CM

## 2023-11-28 DIAGNOSIS — I48.3 TYPICAL ATRIAL FLUTTER (HCC): Primary | ICD-10-CM

## 2023-11-28 PROCEDURE — 1036F TOBACCO NON-USER: CPT | Performed by: NURSE PRACTITIONER

## 2023-11-28 PROCEDURE — G8417 CALC BMI ABV UP PARAM F/U: HCPCS | Performed by: NURSE PRACTITIONER

## 2023-11-28 PROCEDURE — 3017F COLORECTAL CA SCREEN DOC REV: CPT | Performed by: NURSE PRACTITIONER

## 2023-11-28 PROCEDURE — G8427 DOCREV CUR MEDS BY ELIG CLIN: HCPCS | Performed by: NURSE PRACTITIONER

## 2023-11-28 PROCEDURE — 99214 OFFICE O/P EST MOD 30 MIN: CPT | Performed by: NURSE PRACTITIONER

## 2023-11-28 PROCEDURE — G8484 FLU IMMUNIZE NO ADMIN: HCPCS | Performed by: NURSE PRACTITIONER

## 2023-11-28 PROCEDURE — 93000 ELECTROCARDIOGRAM COMPLETE: CPT | Performed by: NURSE PRACTITIONER

## 2023-11-28 PROCEDURE — 3078F DIAST BP <80 MM HG: CPT | Performed by: NURSE PRACTITIONER

## 2023-11-28 PROCEDURE — 3075F SYST BP GE 130 - 139MM HG: CPT | Performed by: NURSE PRACTITIONER

## 2023-11-28 PROCEDURE — 1123F ACP DISCUSS/DSCN MKR DOCD: CPT | Performed by: NURSE PRACTITIONER

## 2023-11-28 ASSESSMENT — ENCOUNTER SYMPTOMS
ORTHOPNEA: 0
SHORTNESS OF BREATH: 0

## 2023-11-28 NOTE — PATIENT INSTRUCTIONS
Please be informed that if you contact our office outside of normal business hours the physician on call cannot help with any scheduling or rescheduling issues, procedure instruction questions or any type of medication issue. We advise you for any urgent/emergency that you go to the nearest emergency room! PLEASE CALL OUR OFFICE DURING NORMAL BUSINESS HOURS    Monday - Friday   8 am to 5 pm    [place]: [address]: [phone]    [place]:  [facility] Laboratory Locations - No appointment necessary. Sites open Monday to Friday. Call your preferred location for test preparation, business   hours and other information you need. [employer] [facility] [employer]. [address] [place] [address]. [facility] [place]. [place], [address]  Phone: [phone]     **It is YOUR responsibilty to bring medication bottles and/or updated medication list to [address] [place] [address]. This will allow us to better serve you and all your healthcare needs**  Thank you for allowing us to care for you today! We want to ensure we can follow your treatment plan and we strive to give you the best outcomes and experience possible. If you ever have a life threatening emergency and call 911 - for an ambulance (EMS)   Our providers can only care for you at:   [facility] or [facility]. Even if you have someone take you or you drive yourself we can only care for you in a [facility] facility. Our providers are not setup at the other healthcare locations! We are committed to providing you the best care possible. If you receive a survey after visiting one of our offices, please take time to share your experience concerning your physician office visit. These surveys are confidential and no health information about you is shared. We are eager to improve for you and we are counting on your feedback to help make that happen.

## 2024-08-09 RX ORDER — DILTIAZEM HYDROCHLORIDE 120 MG/1
120 CAPSULE, COATED, EXTENDED RELEASE ORAL DAILY
Qty: 90 CAPSULE | Refills: 3 | Status: SHIPPED | OUTPATIENT
Start: 2024-08-09

## 2024-08-25 ENCOUNTER — HOSPITAL ENCOUNTER (INPATIENT)
Age: 74
LOS: 1 days | Discharge: HOME | DRG: 310 | End: 2024-08-25
Attending: STUDENT IN AN ORGANIZED HEALTH CARE EDUCATION/TRAINING PROGRAM | Admitting: INTERNAL MEDICINE
Payer: MEDICARE

## 2024-08-25 ENCOUNTER — APPOINTMENT (OUTPATIENT)
Dept: GENERAL RADIOLOGY | Age: 74
DRG: 310 | End: 2024-08-25
Attending: STUDENT IN AN ORGANIZED HEALTH CARE EDUCATION/TRAINING PROGRAM
Payer: MEDICARE

## 2024-08-25 VITALS
SYSTOLIC BLOOD PRESSURE: 140 MMHG | TEMPERATURE: 97.7 F | DIASTOLIC BLOOD PRESSURE: 83 MMHG | HEIGHT: 69 IN | BODY MASS INDEX: 34.48 KG/M2 | OXYGEN SATURATION: 99 % | HEART RATE: 73 BPM | RESPIRATION RATE: 20 BRPM | WEIGHT: 232.81 LBS

## 2024-08-25 DIAGNOSIS — I48.91 ATRIAL FIBRILLATION WITH RAPID VENTRICULAR RESPONSE (HCC): Primary | ICD-10-CM

## 2024-08-25 LAB
ALCOHOL SCREEN SERUM: <0.01 %WT/VOL
AMPHETAMINES: NEGATIVE
ANION GAP SERPL CALCULATED.3IONS-SCNC: 10 MMOL/L (ref 7–16)
ANTI-XA UNFRAC HEPARIN: 0.35 IU/ML
ANTI-XA UNFRAC HEPARIN: <0.1 IU/ML
APTT: 27.6 SECONDS (ref 25.1–37.1)
BARBITURATE SCREEN URINE: NEGATIVE
BASOPHILS ABSOLUTE: 0.1 K/CU MM
BASOPHILS RELATIVE PERCENT: 0.7 % (ref 0–1)
BENZODIAZEPINE SCREEN, URINE: NEGATIVE
BUN SERPL-MCNC: 19 MG/DL (ref 6–23)
CALCIUM SERPL-MCNC: 9.5 MG/DL (ref 8.3–10.6)
CANNABINOID SCREEN URINE: NEGATIVE
CHLORIDE BLD-SCNC: 105 MMOL/L (ref 99–110)
CO2: 26 MMOL/L (ref 21–32)
COCAINE METABOLITE: NEGATIVE
CREAT SERPL-MCNC: 0.9 MG/DL (ref 0.9–1.3)
DIFFERENTIAL TYPE: ABNORMAL
EKG ATRIAL RATE: 115 BPM
EKG DIAGNOSIS: NORMAL
EKG Q-T INTERVAL: 292 MS
EKG QRS DURATION: 80 MS
EKG QTC CALCULATION (BAZETT): 466 MS
EKG R AXIS: -32 DEGREES
EKG T AXIS: 39 DEGREES
EKG VENTRICULAR RATE: 153 BPM
EOSINOPHILS ABSOLUTE: 0.2 K/CU MM
EOSINOPHILS RELATIVE PERCENT: 2.1 % (ref 0–3)
FENTANYL URINE: NEGATIVE
GFR, ESTIMATED: >90 ML/MIN/1.73M2
GLUCOSE SERPL-MCNC: 135 MG/DL (ref 70–99)
HCT VFR BLD CALC: 48.3 % (ref 42–52)
HEMOGLOBIN: 15.9 GM/DL (ref 13.5–18)
IMMATURE NEUTROPHIL %: 0.1 % (ref 0–0.43)
INR BLD: 0.9 INDEX
LYMPHOCYTES ABSOLUTE: 2.9 K/CU MM
LYMPHOCYTES RELATIVE PERCENT: 36.6 % (ref 24–44)
MAGNESIUM: 2.3 MG/DL (ref 1.8–2.4)
MCH RBC QN AUTO: 29.9 PG (ref 27–31)
MCHC RBC AUTO-ENTMCNC: 32.9 % (ref 32–36)
MCV RBC AUTO: 91 FL (ref 78–100)
MONOCYTES ABSOLUTE: 0.9 K/CU MM
MONOCYTES RELATIVE PERCENT: 11.7 % (ref 0–4)
NEUTROPHILS ABSOLUTE: 3.9 K/CU MM
NEUTROPHILS RELATIVE PERCENT: 48.8 % (ref 36–66)
NUCLEATED RBC %: 0 %
OPIATES, URINE: NEGATIVE
OXYCODONE: NEGATIVE
PDW BLD-RTO: 13.1 % (ref 11.7–14.9)
PLATELET # BLD: 206 K/CU MM (ref 140–440)
PMV BLD AUTO: 10.2 FL (ref 7.5–11.1)
POTASSIUM SERPL-SCNC: 3.9 MMOL/L (ref 3.5–5.1)
PRO-BNP: <36 PG/ML
PROTHROMBIN TIME: 12.5 SECONDS (ref 11.7–14.5)
RBC # BLD: 5.31 M/CU MM (ref 4.6–6.2)
SODIUM BLD-SCNC: 141 MMOL/L (ref 135–145)
TOTAL IMMATURE NEUTOROPHIL: 0.01 K/CU MM
TOTAL NUCLEATED RBC: 0 K/CU MM
TROPONIN, HIGH SENSITIVITY: 12 NG/L (ref 0–22)
TROPONIN, HIGH SENSITIVITY: 7 NG/L (ref 0–22)
TSH SERPL DL<=0.005 MIU/L-ACNC: 2.64 UIU/ML (ref 0.27–4.2)
WBC # BLD: 8 K/CU MM (ref 4–10.5)

## 2024-08-25 PROCEDURE — 85730 THROMBOPLASTIN TIME PARTIAL: CPT

## 2024-08-25 PROCEDURE — 99222 1ST HOSP IP/OBS MODERATE 55: CPT | Performed by: INTERNAL MEDICINE

## 2024-08-25 PROCEDURE — 96374 THER/PROPH/DIAG INJ IV PUSH: CPT

## 2024-08-25 PROCEDURE — 80048 BASIC METABOLIC PNL TOTAL CA: CPT

## 2024-08-25 PROCEDURE — 85025 COMPLETE CBC W/AUTO DIFF WBC: CPT

## 2024-08-25 PROCEDURE — 96361 HYDRATE IV INFUSION ADD-ON: CPT

## 2024-08-25 PROCEDURE — 80307 DRUG TEST PRSMV CHEM ANLYZR: CPT

## 2024-08-25 PROCEDURE — 6370000000 HC RX 637 (ALT 250 FOR IP): Performed by: STUDENT IN AN ORGANIZED HEALTH CARE EDUCATION/TRAINING PROGRAM

## 2024-08-25 PROCEDURE — 93005 ELECTROCARDIOGRAM TRACING: CPT | Performed by: STUDENT IN AN ORGANIZED HEALTH CARE EDUCATION/TRAINING PROGRAM

## 2024-08-25 PROCEDURE — 85520 HEPARIN ASSAY: CPT

## 2024-08-25 PROCEDURE — 6360000002 HC RX W HCPCS: Performed by: STUDENT IN AN ORGANIZED HEALTH CARE EDUCATION/TRAINING PROGRAM

## 2024-08-25 PROCEDURE — 2060000000 HC ICU INTERMEDIATE R&B

## 2024-08-25 PROCEDURE — 36415 COLL VENOUS BLD VENIPUNCTURE: CPT

## 2024-08-25 PROCEDURE — 94761 N-INVAS EAR/PLS OXIMETRY MLT: CPT

## 2024-08-25 PROCEDURE — 83735 ASSAY OF MAGNESIUM: CPT

## 2024-08-25 PROCEDURE — 85610 PROTHROMBIN TIME: CPT

## 2024-08-25 PROCEDURE — 99285 EMERGENCY DEPT VISIT HI MDM: CPT

## 2024-08-25 PROCEDURE — 71045 X-RAY EXAM CHEST 1 VIEW: CPT

## 2024-08-25 PROCEDURE — 6370000000 HC RX 637 (ALT 250 FOR IP): Performed by: INTERNAL MEDICINE

## 2024-08-25 PROCEDURE — 2580000003 HC RX 258: Performed by: STUDENT IN AN ORGANIZED HEALTH CARE EDUCATION/TRAINING PROGRAM

## 2024-08-25 PROCEDURE — G0480 DRUG TEST DEF 1-7 CLASSES: HCPCS

## 2024-08-25 PROCEDURE — 83880 ASSAY OF NATRIURETIC PEPTIDE: CPT

## 2024-08-25 PROCEDURE — 84443 ASSAY THYROID STIM HORMONE: CPT

## 2024-08-25 PROCEDURE — 84484 ASSAY OF TROPONIN QUANT: CPT

## 2024-08-25 PROCEDURE — 2580000003 HC RX 258: Performed by: INTERNAL MEDICINE

## 2024-08-25 PROCEDURE — 2500000003 HC RX 250 WO HCPCS: Performed by: STUDENT IN AN ORGANIZED HEALTH CARE EDUCATION/TRAINING PROGRAM

## 2024-08-25 PROCEDURE — 93010 ELECTROCARDIOGRAM REPORT: CPT | Performed by: INTERNAL MEDICINE

## 2024-08-25 RX ORDER — HEPARIN SODIUM 1000 [USP'U]/ML
4000 INJECTION, SOLUTION INTRAVENOUS; SUBCUTANEOUS ONCE
Status: COMPLETED | OUTPATIENT
Start: 2024-08-25 | End: 2024-08-25

## 2024-08-25 RX ORDER — SODIUM CHLORIDE 0.9 % (FLUSH) 0.9 %
5-40 SYRINGE (ML) INJECTION PRN
Status: DISCONTINUED | OUTPATIENT
Start: 2024-08-25 | End: 2024-08-25 | Stop reason: HOSPADM

## 2024-08-25 RX ORDER — DILTIAZEM HYDROCHLORIDE 240 MG/1
240 CAPSULE, COATED, EXTENDED RELEASE ORAL DAILY
Qty: 30 CAPSULE | Refills: 3 | Status: SHIPPED | OUTPATIENT
Start: 2024-08-26

## 2024-08-25 RX ORDER — ASPIRIN 81 MG/1
324 TABLET, CHEWABLE ORAL ONCE
Status: COMPLETED | OUTPATIENT
Start: 2024-08-25 | End: 2024-08-25

## 2024-08-25 RX ORDER — POLYETHYLENE GLYCOL 3350 17 G/17G
17 POWDER, FOR SOLUTION ORAL DAILY PRN
Status: DISCONTINUED | OUTPATIENT
Start: 2024-08-25 | End: 2024-08-25 | Stop reason: HOSPADM

## 2024-08-25 RX ORDER — DILTIAZEM HYDROCHLORIDE 240 MG/1
240 CAPSULE, COATED, EXTENDED RELEASE ORAL DAILY
Status: DISCONTINUED | OUTPATIENT
Start: 2024-08-26 | End: 2024-08-25 | Stop reason: HOSPADM

## 2024-08-25 RX ORDER — DILTIAZEM HYDROCHLORIDE 5 MG/ML
20 INJECTION INTRAVENOUS ONCE
Status: COMPLETED | OUTPATIENT
Start: 2024-08-25 | End: 2024-08-25

## 2024-08-25 RX ORDER — HEPARIN SODIUM 10000 [USP'U]/100ML
5-30 INJECTION, SOLUTION INTRAVENOUS CONTINUOUS
Status: DISCONTINUED | OUTPATIENT
Start: 2024-08-25 | End: 2024-08-25

## 2024-08-25 RX ORDER — 0.9 % SODIUM CHLORIDE 0.9 %
1000 INTRAVENOUS SOLUTION INTRAVENOUS ONCE
Status: COMPLETED | OUTPATIENT
Start: 2024-08-25 | End: 2024-08-25

## 2024-08-25 RX ORDER — CETIRIZINE HYDROCHLORIDE 10 MG/1
10 TABLET ORAL NIGHTLY
Status: DISCONTINUED | OUTPATIENT
Start: 2024-08-25 | End: 2024-08-25 | Stop reason: HOSPADM

## 2024-08-25 RX ORDER — HEPARIN SODIUM 1000 [USP'U]/ML
4000 INJECTION, SOLUTION INTRAVENOUS; SUBCUTANEOUS PRN
Status: DISCONTINUED | OUTPATIENT
Start: 2024-08-25 | End: 2024-08-25

## 2024-08-25 RX ORDER — ONDANSETRON 4 MG/1
4 TABLET, ORALLY DISINTEGRATING ORAL EVERY 8 HOURS PRN
Status: DISCONTINUED | OUTPATIENT
Start: 2024-08-25 | End: 2024-08-25 | Stop reason: HOSPADM

## 2024-08-25 RX ORDER — ACETAMINOPHEN 500 MG
1000 TABLET ORAL ONCE
Status: DISCONTINUED | OUTPATIENT
Start: 2024-08-25 | End: 2024-08-25 | Stop reason: HOSPADM

## 2024-08-25 RX ORDER — EZETIMIBE 10 MG/1
10 TABLET ORAL DAILY
Status: DISCONTINUED | OUTPATIENT
Start: 2024-08-25 | End: 2024-08-25 | Stop reason: HOSPADM

## 2024-08-25 RX ORDER — PANTOPRAZOLE SODIUM 40 MG/1
40 TABLET, DELAYED RELEASE ORAL
Status: DISCONTINUED | OUTPATIENT
Start: 2024-08-25 | End: 2024-08-25 | Stop reason: HOSPADM

## 2024-08-25 RX ORDER — SODIUM CHLORIDE 0.9 % (FLUSH) 0.9 %
5-40 SYRINGE (ML) INJECTION EVERY 12 HOURS SCHEDULED
Status: DISCONTINUED | OUTPATIENT
Start: 2024-08-25 | End: 2024-08-25 | Stop reason: HOSPADM

## 2024-08-25 RX ORDER — SODIUM CHLORIDE 9 MG/ML
INJECTION, SOLUTION INTRAVENOUS PRN
Status: DISCONTINUED | OUTPATIENT
Start: 2024-08-25 | End: 2024-08-25 | Stop reason: HOSPADM

## 2024-08-25 RX ORDER — ACETAMINOPHEN 325 MG/1
650 TABLET ORAL EVERY 6 HOURS PRN
Status: DISCONTINUED | OUTPATIENT
Start: 2024-08-25 | End: 2024-08-25 | Stop reason: HOSPADM

## 2024-08-25 RX ORDER — ASPIRIN 81 MG/1
81 TABLET, CHEWABLE ORAL NIGHTLY
Status: DISCONTINUED | OUTPATIENT
Start: 2024-08-25 | End: 2024-08-25

## 2024-08-25 RX ORDER — ONDANSETRON 2 MG/ML
4 INJECTION INTRAMUSCULAR; INTRAVENOUS EVERY 6 HOURS PRN
Status: DISCONTINUED | OUTPATIENT
Start: 2024-08-25 | End: 2024-08-25 | Stop reason: HOSPADM

## 2024-08-25 RX ORDER — DILTIAZEM HYDROCHLORIDE 120 MG/1
120 CAPSULE, COATED, EXTENDED RELEASE ORAL DAILY
Status: DISCONTINUED | OUTPATIENT
Start: 2024-08-25 | End: 2024-08-25

## 2024-08-25 RX ORDER — ACETAMINOPHEN 650 MG/1
650 SUPPOSITORY RECTAL EVERY 6 HOURS PRN
Status: DISCONTINUED | OUTPATIENT
Start: 2024-08-25 | End: 2024-08-25 | Stop reason: HOSPADM

## 2024-08-25 RX ORDER — HEPARIN SODIUM 1000 [USP'U]/ML
2000 INJECTION, SOLUTION INTRAVENOUS; SUBCUTANEOUS PRN
Status: DISCONTINUED | OUTPATIENT
Start: 2024-08-25 | End: 2024-08-25

## 2024-08-25 RX ADMIN — SODIUM CHLORIDE, PRESERVATIVE FREE 10 ML: 5 INJECTION INTRAVENOUS at 09:05

## 2024-08-25 RX ADMIN — ASPIRIN 243 MG: 81 TABLET, CHEWABLE ORAL at 01:15

## 2024-08-25 RX ADMIN — SODIUM CHLORIDE 1000 ML: 9 INJECTION, SOLUTION INTRAVENOUS at 00:51

## 2024-08-25 RX ADMIN — PANTOPRAZOLE SODIUM 40 MG: 40 TABLET, DELAYED RELEASE ORAL at 07:00

## 2024-08-25 RX ADMIN — HEPARIN SODIUM 9 UNITS/KG/HR: 10000 INJECTION, SOLUTION INTRAVENOUS at 02:45

## 2024-08-25 RX ADMIN — HEPARIN SODIUM 4000 UNITS: 1000 INJECTION INTRAVENOUS; SUBCUTANEOUS at 02:38

## 2024-08-25 RX ADMIN — SODIUM CHLORIDE 5 MG/HR: 900 INJECTION, SOLUTION INTRAVENOUS at 02:36

## 2024-08-25 RX ADMIN — DILTIAZEM HYDROCHLORIDE 120 MG: 120 CAPSULE, EXTENDED RELEASE ORAL at 11:23

## 2024-08-25 RX ADMIN — EZETIMIBE 10 MG: 10 TABLET ORAL at 09:05

## 2024-08-25 RX ADMIN — DILTIAZEM HYDROCHLORIDE 20 MG: 5 INJECTION, SOLUTION INTRAVENOUS at 01:16

## 2024-08-25 ASSESSMENT — LIFESTYLE VARIABLES
HOW OFTEN DO YOU HAVE A DRINK CONTAINING ALCOHOL: NEVER
HOW MANY STANDARD DRINKS CONTAINING ALCOHOL DO YOU HAVE ON A TYPICAL DAY: PATIENT DOES NOT DRINK

## 2024-08-25 ASSESSMENT — PAIN SCALES - GENERAL
PAINLEVEL_OUTOF10: 0
PAINLEVEL_OUTOF10: 0
PAINLEVEL_OUTOF10: 1
PAINLEVEL_OUTOF10: 0

## 2024-08-25 ASSESSMENT — PAIN - FUNCTIONAL ASSESSMENT: PAIN_FUNCTIONAL_ASSESSMENT: 0-10

## 2024-08-25 NOTE — PLAN OF CARE
Problem: Discharge Planning  Goal: Discharge to home or other facility with appropriate resources  8/25/2024 1312 by Harriett Marsh RN  Outcome: Adequate for Discharge  8/25/2024 1258 by Harriett Marsh RN  Outcome: Progressing  8/25/2024 0359 by Lorenzo Bedolla RN  Outcome: Progressing     Problem: Pain  Goal: Verbalizes/displays adequate comfort level or baseline comfort level  8/25/2024 1312 by Harriett Marsh RN  Outcome: Adequate for Discharge  8/25/2024 1258 by Harriett Marsh RN  Outcome: Progressing  8/25/2024 0359 by Lorenzo Bedolla RN  Outcome: Progressing     Problem: Safety - Adult  Goal: Free from fall injury  8/25/2024 1312 by Harriett Marsh RN  Outcome: Adequate for Discharge  8/25/2024 1258 by Harriett Marsh RN  Outcome: Progressing  8/25/2024 0359 by Lorenzo Bedolla RN  Outcome: Progressing

## 2024-08-25 NOTE — H&P
results for input(s): \"LACTA\" in the last 72 hours.  BNP:   Recent Labs     08/25/24  0035   PROBNP <36     UA:  Lab Results   Component Value Date/Time    NITRU NEGATIVE 06/16/2011 10:40 AM    COLORU YELLOW 06/16/2011 10:40 AM    PHUR 7.0 06/16/2011 10:40 AM    WBCUA <1 06/16/2011 10:40 AM    RBCUA NO CELLS SEEN 06/16/2011 10:40 AM    MUCUS RARE 06/16/2011 10:40 AM    BACTERIA NEGATIVE 06/16/2011 10:40 AM    CLARITYU CLEAR 06/16/2011 10:40 AM    LEUKOCYTESUR NEGATIVE 06/16/2011 10:40 AM    UROBILINOGEN 0.2 06/16/2011 10:40 AM    BILIRUBINUR NEGATIVE 06/16/2011 10:40 AM    BLOODU NEGATIVE 06/16/2011 10:40 AM    GLUCOSEU NEGATIVE 06/16/2011 10:40 AM    KETUA NEGATIVE 06/16/2011 10:40 AM     Urine Cultures: No results found for: \"LABURIN\"  Blood Cultures: No results found for: \"BC\"  No results found for: \"BLOODCULT2\"  Organism: No results found for: \"ORG\"    Imaging/Diagnostics Last 24 Hours   XR CHEST PORTABLE    Result Date: 8/25/2024  Chest X-ray INDICATION: Palpitations COMPARISON:  None TECHNIQUE: AP/PA view of the chest was obtained. FINDINGS: The lungs are clear. There are no infiltrates or effusions.  The heart size is normal.  The bony thorax is intact.      No acute findings in the chest Electronically signed by Harsh Tom      Personally reviewed Lab Studies, Imaging, and discussed case with ED physician.    Electronically signed by Marcel Brink MD on 8/25/2024 at 2:12 AM    Comment: Please note this report has been produced using speech recognition software and may contain errors related to that system including errors in grammar, punctuation, and spelling, as well as words and phrases that may be inappropriate. If there are any questions or concerns please feel free to contact the dictating provider for clarification.

## 2024-08-25 NOTE — PROGRESS NOTES
Pt arrived from ED via stretcher. Pt was able to demonstrate proper use of bed controls and call light system.

## 2024-08-25 NOTE — ED PROVIDER NOTES
Affect: Mood normal.         Behavior: Behavior normal.         Thought Content: Thought content normal.         Judgment: Judgment normal.           MEDICAL DECISION MAKING/ED COURSE   Initial Assessment:     74 y.o. male presenting to the ED for   Chief Complaint   Patient presents with    Palpitations     Having palpitations at home.  Wife did EKG at home and pt showed afib RVR       Differential diagnoses include but not limited to:  ACS ACS, arrhythmia, aortic dissection, cardiac tamponade, pneumothorax, PE, esophageal rupture, pneumonia, CHF, COPD, myocarditis, pericarditis, electrolyte abnormality, valvular heart disease, GERD, musculoskeletal    Plan:       EKG  Labs  Imaging  IV fluids  Antiarrhythmic  [id number] 2 score: 2  Diltiazem infusion  Heparin infusion  Admission        Brief Summary of Patient Presentation:    Patient is a 74 y.o. male with a past medical history of hypertension hyperlipidemia A-fib who was seen and evaluated in the emergency department for palpitations.  On arrival patient appears to be in atrial fibrillation vs flutter with rapid ventricular response with heart rate around 150s.  Did have some improvement with initiation of IV fluids but still tachycardic so did order for diltiazem.  Reports multiple episodes in the past and does mention some alcohol use as well as caffeine intake which I think could be a likely exacerbating factor for episode today, low suspicion for PE, do not think he needs CTA at this point.  Lab work unremarkable.  Chest x-ray unremarkable.  Did have some improvement with diltiazem but is still in A-fib so we will start diltiazem infusion.  [id number] score is a 2 so discussed with pt and family and they were agreeable for heparin infusion.  Given his A-fib RVR felt he benefit from admission for further evaluation and management.  Discussed with the admitting hospitalist.  Patient to be admitted.            Chronic conditions affecting care:  (has no administration in time range)   heparin 25,000 unit in sodium chloride 0.45% 250 mL (premix) infusion (has no administration in time range)   sodium chloride 0.9 % bolus 1,000 mL (1,000 mLs IntraVENous New Bag 8/25/24 0051)   dilTIAZem injection 20 mg (20 mg IntraVENous Given 8/25/24 0116)   aspirin chewable tablet 324 mg (243 mg Oral Given 8/25/24 0115)       DISCHARGE PRESCRIPTIONS: (None if blank)  New Prescriptions    No medications on file       I have reviewed and interpreted all of the currently available lab results from this visit (if applicable):    Radiographs (if obtained):  Radiologist's Report Reviewed:  XR CHEST PORTABLE   Final Result   No acute findings in the chest         Electronically signed by Harsh Tom          LABS: (none if blank)  Labs Reviewed   CBC WITH AUTO DIFFERENTIAL - Abnormal; Notable for the following components:       Result Value    Monocytes % 11.7 (*)     All other components within normal limits   BASIC METABOLIC PANEL - Abnormal; Notable for the following components:    Glucose 135 (*)     All other components within normal limits   TROPONIN   BRAIN NATRIURETIC PEPTIDE   TSH WITH REFLEX   MAGNESIUM   ETHANOL   TROPONIN   URINE DRUG SCREEN   APTT   PROTIME-INR   ANTI-XA, UNFRACTIONATED HEPARIN   ANTI-XA, UNFRACTIONATED HEPARIN   ANTI-XA, UNFRACTIONATED HEPARIN       FINAL IMPRESSION      Final diagnoses:   Atrial fibrillation with rapid ventricular response (HCC)     Condition: stable  Dispo: Admission      This transcription was electronically signed. Parts of this transcriptions may have been dictated by use of voice recognition software and electronically transcribed, and parts may have been transcribed with the assistance of an ED scribe and may contain errors related to that system including errors in grammar, punctuation, and spelling, as well as words and phrases that may be inappropriate.  The transcription may contain errors not detected in proofreading.

## 2024-08-25 NOTE — ED NOTES
ED TO INPATIENT SBAR HANDOFF    Patient Name: Enrico Herzog   :  1950  74 y.o.   Preferred Name  Enrico   Family/Caregiver Present yes   Restraints no   C-SSRS: Risk of Suicide: No Risk  Sitter no   Sepsis Risk Score        Situation  Chief Complaint   Patient presents with    Palpitations     Having palpitations at home.  Wife did EKG at home and pt showed afib RVR     Brief Description of Patient's Condition: Patient presents to the ED from home with complaints of palpitations. Patient has a history of a flutter that he had an ablasion done a few years ago to correct. Patient states he has occasionally had episodes of atrial fibrillation. States they have self resolved in the past. States around 2330 he began having palpitations and some jaw discomfort, denies chest pain and shortness of breath. Patient found to be in afib rvr. Patient given medications and started on both diltiazem and heparin drips. Patient denies any pain at this time. Patient is resting in bed, respirations even and unlabored.   Mental Status: oriented and alert  Arrived from: home    Imaging:   XR CHEST PORTABLE   Final Result   No acute findings in the chest         Electronically signed by Harsh Tom        Abnormal labs:   Abnormal Labs Reviewed   CBC WITH AUTO DIFFERENTIAL - Abnormal; Notable for the following components:       Result Value    Monocytes % 11.7 (*)     All other components within normal limits   BASIC METABOLIC PANEL - Abnormal; Notable for the following components:    Glucose 135 (*)     All other components within normal limits        Background  History:   Past Medical History:   Diagnosis Date    Basal cell carcinoma of skin     RIGHT ANTERIOR SHOULDER    Benign prostatic hyperplasia     Chronic sinusitis     GERD (gastroesophageal reflux disease)     H/O seasonal allergies     Low back pain     Pancreatic abnormality     INCREASED PANCREATIC LIPASE    S/P discectomy for herniated nucleus pulposus      C6-C7       Assessment    Vitals: MEWS Score: 4  Level of Consciousness: Alert (0)   Vitals:    08/25/24 0116 08/25/24 0133 08/25/24 0200 08/25/24 0202   BP: (!) 140/77   112/73   Pulse: (!) 124  85 92   Resp:   13 12   Temp:       TempSrc:       SpO2:   96% 94%   Weight:  105.2 kg (232 lb)       PO Status: Regular  O2 Flow Rate: O2 Device: None (Room air)    Cardiac Rhythm: Afib RVR  Last documented pain medication administered: none  NIH Score: NIH     Active LDA's:   Peripheral IV 08/25/24 Right Antecubital (Active)   Site Assessment Clean, dry & intact 08/25/24 0036   Line Status Blood return noted;Flushed;Normal saline locked;Specimen collected 08/25/24 0036   Line Care Connections checked and tightened 08/25/24 0036   Phlebitis Assessment No symptoms 08/25/24 0036   Infiltration Assessment 0 08/25/24 0036   Alcohol Cap Used No 08/25/24 0036   Dressing Status New dressing applied 08/25/24 0036   Dressing Type Transparent 08/25/24 0036   Dressing Intervention New 08/25/24 0036       Peripheral IV 08/25/24 Distal;Posterior;Right Forearm (Active)   Site Assessment Clean, dry & intact 08/25/24 0221   Line Status Blood return noted;Flushed;Normal saline locked 08/25/24 0221   Line Care Connections checked and tightened 08/25/24 0221   Phlebitis Assessment No symptoms 08/25/24 0221   Infiltration Assessment 0 08/25/24 0221   Alcohol Cap Used No 08/25/24 0221   Dressing Status New dressing applied 08/25/24 0221   Dressing Type Transparent 08/25/24 0221   Dressing Intervention New 08/25/24 0221       Pertinent or High Risk Medications/Drips: yes   If Yes, please provide details: heparin & diltiazem  Blood Product Administration: no  If Yes, please provide details: none    Recommendation    Incomplete orders: Admission orders   Additional Comments: none   If any further questions, please call Sending RN at [phone]    Electronically signed by: Electronically signed by Natalia Salazar RN on 8/25/2024 at 2:22 AM

## 2024-08-25 NOTE — PROGRESS NOTES
4 Eyes Skin Assessment     NAME:  Enrico Herzog  YOB: 1950  MEDICAL RECORD NUMBER:  [id number]    The patient is being assessed for  Admission    I agree that at least one RN has performed a thorough Head to Toe Skin Assessment on the patient. ALL assessment sites listed below have been assessed.      Areas assessed by both nurses:    Head, Face, Ears, Shoulders, Back, Chest, Arms, Elbows, Hands, Sacrum. Buttock, Coccyx, Ischium, Legs. Feet and Heels, and Under Medical Devices         Does the Patient have a Wound? No noted wound(s)       Micky Prevention initiated by RN: No  Wound Care Orders initiated by RN: No    Pressure Injury (Stage 3,4, Unstageable, DTI, NWPT, and Complex wounds) if present, place Wound referral order by RN under : No    New Ostomies, if present place, Ostomy referral order under : No     Nurse 1 eSignature: Electronically signed by Lorenzo Bedolla RN on 8/25/24 at 4:53 AM EDT    **SHARE this note so that the co-signing nurse can place an eSignature**    Nurse 2 eSignature: Electronically signed by Onelia Velazquez RN on 8/25/24 at 5:51 AM EDT

## 2024-08-25 NOTE — CONSULTS
history of A-fib had ablation in the past  Now with A-fib with RVR converted to sinus rhythm right now can  Has history of hematuria Watchman discussed with the patient  Will add Xarelto as he cannot afford Eliquis  Will follow-up as outpatient                      Name:  Enrico Herzog /Age/Sex: 1950  (74 y.o. male)   MRN & CSN:  [id number] & [id number] Admission Date/Time: 2024 12:27 AM   Location:  -A PCP: Xena English APRN - CNP       Hospital Day: 1          Referring physician:  Marcel Brink MD         Reason for consultation:  Paroxysmal atrial fibrillation   148.00            Thanks for referral.    Information source: patient    CC;  atrial fibrillation      HPI:   Thank you for involving me in taking  care of Enrico Herzog who  is a 74 y.o.year  Old male  Presents with history of atrial flutter has history of ablation, hypertension, hyperlipidemia now presents with episodes of rapid heartbeat was in A-fib with RVR has converted into sinus this more       , denies any chest pain shortness of breath this morning is in sinus and is feeling better, he has history of hematuria has not been anticoagulated, I told him that he is going into A-fib he will definitely anticoagulation but according to him he would prefer NOAC other than Eliquis hence have sent a Xarelto prescription and we will monitor him and will follow him, will also discussed with him the Watchman device as well given the history of hematuria              Past medical history:    has a past medical history of Basal cell carcinoma of skin, Benign prostatic hyperplasia, Chronic sinusitis, GERD (gastroesophageal reflux disease), H/O seasonal allergies, Low back pain, Pancreatic abnormality, and S/P discectomy for herniated nucleus pulposus.  Past surgical history:   has a past surgical history that includes Appendectomy; knee surgery (Bilateral); Tongue surgery; Colonoscopy (04/10/2015); Vasectomy

## 2024-08-25 NOTE — PLAN OF CARE
Problem: Discharge Planning  Goal: Discharge to home or other facility with appropriate resources  8/25/2024 1258 by Harriett Marsh, RN  Outcome: Progressing  8/25/2024 0359 by Lorenzo Bedolla RN  Outcome: Progressing     Problem: Pain  Goal: Verbalizes/displays adequate comfort level or baseline comfort level  8/25/2024 1258 by Harriett Marsh, RN  Outcome: Progressing  8/25/2024 0359 by Lorenzo Bedolla RN  Outcome: Progressing     Problem: Safety - Adult  Goal: Free from fall injury  8/25/2024 1258 by Harriett Marsh, RN  Outcome: Progressing  8/25/2024 0359 by Lorenzo Bedolla RN  Outcome: Progressing

## 2024-08-25 NOTE — PROGRESS NOTES
Discharge instructions reviewed. Questions answered. Belongings gathered and checked with admission list. IV removed. Site WNL. Patient discharged to front with wife.

## 2024-08-29 ENCOUNTER — TELEPHONE (OUTPATIENT)
Dept: CARDIOLOGY | Age: 74
End: 2024-08-29

## 2024-08-29 NOTE — TELEPHONE ENCOUNTER
He is having hematuria on Xarelto discussed with Dr. Drew will plan outpatient Watchman procedure

## 2024-09-04 ENCOUNTER — OFFICE VISIT (OUTPATIENT)
Dept: CARDIOLOGY CLINIC | Age: 74
End: 2024-09-04

## 2024-09-04 VITALS
HEIGHT: 70 IN | OXYGEN SATURATION: 96 % | WEIGHT: 235 LBS | HEART RATE: 75 BPM | DIASTOLIC BLOOD PRESSURE: 68 MMHG | BODY MASS INDEX: 33.64 KG/M2 | SYSTOLIC BLOOD PRESSURE: 142 MMHG

## 2024-09-04 DIAGNOSIS — I48.91 ATRIAL FIBRILLATION, UNSPECIFIED TYPE (HCC): Primary | ICD-10-CM

## 2024-09-04 DIAGNOSIS — I10 ESSENTIAL HYPERTENSION: Chronic | ICD-10-CM

## 2024-09-04 RX ORDER — DILTIAZEM HYDROCHLORIDE 240 MG/1
240 CAPSULE, COATED, EXTENDED RELEASE ORAL DAILY
Qty: 90 CAPSULE | Refills: 3 | Status: SHIPPED | OUTPATIENT
Start: 2024-09-04

## 2024-09-04 RX ORDER — LOSARTAN POTASSIUM 50 MG/1
50 TABLET ORAL DAILY
Qty: 90 TABLET | Refills: 3 | Status: SHIPPED | OUTPATIENT
Start: 2024-09-04

## 2024-09-04 RX ORDER — LOSARTAN POTASSIUM 100 MG/1
50 TABLET ORAL DAILY
Qty: 90 TABLET | Refills: 3 | Status: CANCELLED | OUTPATIENT
Start: 2024-09-04

## 2024-09-04 ASSESSMENT — ENCOUNTER SYMPTOMS
SHORTNESS OF BREATH: 0
ORTHOPNEA: 0

## 2024-09-04 NOTE — PROGRESS NOTES
9/4/2024  Primary cardiologist: Dr. Drew    CC:   Enrico  is an established 74 y.o.  male here for a follow up hospital for at fib       SUBJECTIVE/OBJECTIVE:  Enrico is a 74 y.o. male with a history of atrial flutter s/p ablation, hypertension, hyperlipidemia and GERD      HPI :   Enrico was recently seen in the hospital for atrial fib RVR. He converted to sinus.  He previously was on Eliquis however developed significant hematuria.  His Eliquis been switched over to Xarelto.  He has been on Xarelto for about 2 weeks now.  Has not noticed any significant hematuria.  No episodes of palpitations since discharge from hospital.    Review of Systems   Constitutional: Negative for diaphoresis and malaise/fatigue.   Cardiovascular:  Negative for chest pain, claudication, dyspnea on exertion, irregular heartbeat, leg swelling, near-syncope, orthopnea, palpitations and paroxysmal nocturnal dyspnea.   Respiratory:  Negative for shortness of breath.    Genitourinary:  Positive for hematuria.   Neurological:  Negative for dizziness and light-headedness.       There were no vitals filed for this visit.    Wt Readings from Last 3 Encounters:   08/25/24 105.6 kg (232 lb 12.9 oz)   11/28/23 104.3 kg (230 lb)   07/17/23 108.2 kg (238 lb 9.6 oz)      There is no height or weight on file to calculate BMI.     Physical Exam  Vitals reviewed.   Eyes:      Pupils: Pupils are equal, round, and reactive to light.   Neck:      Vascular: No carotid bruit.   Cardiovascular:      Rate and Rhythm: Normal rate and regular rhythm.      Pulses: Normal pulses.   Pulmonary:      Effort: Pulmonary effort is normal.      Breath sounds: Normal breath sounds.   Musculoskeletal:      Right lower leg: No edema.      Left lower leg: No edema.   Skin:     General: Skin is warm and dry.      Capillary Refill: Capillary refill takes less than 2 seconds.   Neurological:      Mental Status: He is alert and oriented to person, place, and time.

## 2024-09-20 ENCOUNTER — TELEPHONE (OUTPATIENT)
Dept: CARDIOLOGY CLINIC | Age: 74
End: 2024-09-20

## 2024-10-02 ENCOUNTER — OFFICE VISIT (OUTPATIENT)
Dept: CARDIOLOGY CLINIC | Age: 74
End: 2024-10-02
Payer: MEDICARE

## 2024-10-02 ENCOUNTER — TELEPHONE (OUTPATIENT)
Dept: CARDIOLOGY CLINIC | Age: 74
End: 2024-10-02

## 2024-10-02 VITALS
WEIGHT: 238.6 LBS | HEART RATE: 73 BPM | SYSTOLIC BLOOD PRESSURE: 132 MMHG | BODY MASS INDEX: 34.16 KG/M2 | HEIGHT: 70 IN | DIASTOLIC BLOOD PRESSURE: 80 MMHG | OXYGEN SATURATION: 96 %

## 2024-10-02 DIAGNOSIS — I48.0 PAF (PAROXYSMAL ATRIAL FIBRILLATION) (HCC): Primary | ICD-10-CM

## 2024-10-02 PROCEDURE — 3017F COLORECTAL CA SCREEN DOC REV: CPT | Performed by: INTERNAL MEDICINE

## 2024-10-02 PROCEDURE — G8427 DOCREV CUR MEDS BY ELIG CLIN: HCPCS | Performed by: INTERNAL MEDICINE

## 2024-10-02 PROCEDURE — 99214 OFFICE O/P EST MOD 30 MIN: CPT | Performed by: INTERNAL MEDICINE

## 2024-10-02 PROCEDURE — 3075F SYST BP GE 130 - 139MM HG: CPT | Performed by: INTERNAL MEDICINE

## 2024-10-02 PROCEDURE — 1036F TOBACCO NON-USER: CPT | Performed by: INTERNAL MEDICINE

## 2024-10-02 PROCEDURE — 1123F ACP DISCUSS/DSCN MKR DOCD: CPT | Performed by: INTERNAL MEDICINE

## 2024-10-02 PROCEDURE — G8417 CALC BMI ABV UP PARAM F/U: HCPCS | Performed by: INTERNAL MEDICINE

## 2024-10-02 PROCEDURE — 3079F DIAST BP 80-89 MM HG: CPT | Performed by: INTERNAL MEDICINE

## 2024-10-02 PROCEDURE — G8484 FLU IMMUNIZE NO ADMIN: HCPCS | Performed by: INTERNAL MEDICINE

## 2024-10-02 NOTE — PROGRESS NOTES
Electrophysiology Consult Note      Reason for consultation:  Atrial flutter    Chief complaint :  Palpitations    Referring physician: Dr. Drew      Primary care physician: Xena English APRN - CNP      History of Present Illness:     This visit (10/2/2024)      Chief Complaint   Patient presents with    Follow-up     Patient denies chest pain, shortness of breath, palpitations, dizziness, syncope. Patient denies tobacco and caffeine. Patient states he drinks 1-2 mixed alcohol drinks. Patient states he recently joined a Gym and has been exercising regularly.    Patient is here to discuss watchman as he is concerned about being on anticoagulation  Recently had an episode of atrial fibrillation and he had palpitation. He is now back on anticoagulation - on xarelto.            Previous visit:     Patient is 71-year-old male with a history of hypertension, hyperlipidemia referred by Dr. Drew for atrial flutter.  Patient reports that recently he went to the emergency room with chest pain and palpitations and was noted to be in atrial flutter.  Patient was referred to me for further management.  Patient reports he does not feel any symptoms now but when he had the arrhythmia he was very symptomatic.  Patient denies drinking alcohol or smoking.  Patient reports drinking coffee every morning.  Patient does exercise      Pastmedical history:   Past Medical History:   Diagnosis Date    Basal cell carcinoma of skin     RIGHT ANTERIOR SHOULDER    Benign prostatic hyperplasia     Chronic sinusitis     GERD (gastroesophageal reflux disease)     H/O seasonal allergies     Low back pain     Pancreatic abnormality     INCREASED PANCREATIC LIPASE    S/P discectomy for herniated nucleus pulposus     C6-C7       Surgical history :   Past Surgical History:   Procedure Laterality Date    ABLATION OF DYSRHYTHMIC FOCUS  12/21/2021    atrial flutter ablation    APPENDECTOMY

## 2024-10-02 NOTE — PROGRESS NOTES
Saw patient for Pre Watchman Education.   My role as Watchman Coordinator explained.   Watchman discussed, Brochure provided and ACTV8 video shared.   Nice Tool utilized and filled out.   NIce Tool scanned into the chart for SD appointment with Dr Canas on 10/9/2024.    Explained to the patient:  Part of the FDA approval of the Watchman procedure in 2015 mandated that each procedure must participate in a national registry, this requires several follow up appointments and testing following the procedure.      These expectations Include:      7-10 day follow up with the Nurse practitioner or Implanting Physician    45 day follow up lab work and ELISA to check positioning of the device.      6 month follow up telephone call     1 year follow up appointment and lab work (ELISA per Implanting physician discretion)    2  year follow up appointment and lab work .    Discussed the importance of blood thinners as prescribed and that the patient cannot come off those blood thinners until discontinued by the implanting physician.     Assessment/History of:    Nickel or metal allergy?  [] Yes     [x] No    Contrast allergy?  [] Yes     [x] No    Anesthesia issues?  [] Yes     [x] No    Difficulty swallowing?  [] Yes     [x] No    Do you have any procedures/surgeries planned that would interrupt Plavix and ASA for next 6 months?  [] Yes     [x] No    History of Sleep Apnea?  [] Yes     [x] No    Do you wear a CPAP?  [] Yes     [x] No      Modified [place] Scale:    [x] 0: No symptoms at all  [] 1: No significant disability despite symptoms  [] 2: Slight disability   [] 3: Moderate disability  [] 4: Moderately severe disability  [] 5: Severe disability    [] Modified [place] Not Administered      All questions and concerns answered.  Will plan Watchman procedure for 10/17/2024 at 0700.  No ELISA needed.  Kevin notified for scheduling.  Will follow    Venecia Nur RN  Watchman CoordinatorElectronically signed by Venecia ANDERSEN

## 2024-10-02 NOTE — TELEPHONE ENCOUNTER
Patient was scheduled to see Dr. Conner on 10/8/24 for Watchman consult, Patient requested to see Dr. Ricks as he has in the past. I cancelled Dr. Conner appointment and scheduled office visit with Dr. Ricks on 10/2/24 @ 330pm.    Patient and Spouse advised understanding.

## 2024-10-04 ENCOUNTER — TELEPHONE (OUTPATIENT)
Dept: CARDIOLOGY CLINIC | Age: 74
End: 2024-10-04

## 2024-10-04 NOTE — TELEPHONE ENCOUNTER
Pt called and states that they will be in [place] till 05/15/25.  Pt needs to r/s.     Please call patient back.

## 2024-10-07 ENCOUNTER — TELEPHONE (OUTPATIENT)
Age: 74
End: 2024-10-07

## 2024-10-07 NOTE — TELEPHONE ENCOUNTER
Alyce scheduled.    PPW is 10/10/2024 @ 1100.   Alyce procedure for 10/17/2024 at 0700.\    Patient notified of procedure dates and times.  All questions verbalized answered.  Will follow.    Electronically signed by Venecia Nur RN on 10/7/2024 at 2:01 PM [facility] Care Coordinator

## 2024-10-09 ENCOUNTER — OFFICE VISIT (OUTPATIENT)
Dept: CARDIOLOGY CLINIC | Age: 74
End: 2024-10-09
Payer: MEDICARE

## 2024-10-09 VITALS
DIASTOLIC BLOOD PRESSURE: 68 MMHG | WEIGHT: 237.4 LBS | BODY MASS INDEX: 33.99 KG/M2 | OXYGEN SATURATION: 96 % | HEIGHT: 70 IN | SYSTOLIC BLOOD PRESSURE: 114 MMHG | HEART RATE: 60 BPM

## 2024-10-09 DIAGNOSIS — Z86.79 S/P ABLATION OF ATRIAL FLUTTER: ICD-10-CM

## 2024-10-09 DIAGNOSIS — I10 ESSENTIAL HYPERTENSION: Chronic | ICD-10-CM

## 2024-10-09 DIAGNOSIS — E66.01 CLASS 2 SEVERE OBESITY DUE TO EXCESS CALORIES WITH SERIOUS COMORBIDITY AND BODY MASS INDEX (BMI) OF 36.0 TO 36.9 IN ADULT: ICD-10-CM

## 2024-10-09 DIAGNOSIS — E66.812 CLASS 2 SEVERE OBESITY DUE TO EXCESS CALORIES WITH SERIOUS COMORBIDITY AND BODY MASS INDEX (BMI) OF 36.0 TO 36.9 IN ADULT: ICD-10-CM

## 2024-10-09 DIAGNOSIS — Z98.890 S/P ABLATION OF ATRIAL FLUTTER: ICD-10-CM

## 2024-10-09 DIAGNOSIS — I48.0 PAROXYSMAL ATRIAL FIBRILLATION (HCC): Primary | ICD-10-CM

## 2024-10-09 PROBLEM — I48.92 ATRIAL FLUTTER (HCC): Status: RESOLVED | Noted: 2021-11-26 | Resolved: 2024-10-09

## 2024-10-09 PROBLEM — I48.91 ATRIAL FIBRILLATION WITH RAPID VENTRICULAR RESPONSE (HCC): Status: RESOLVED | Noted: 2024-08-25 | Resolved: 2024-10-09

## 2024-10-09 PROCEDURE — 1123F ACP DISCUSS/DSCN MKR DOCD: CPT | Performed by: INTERNAL MEDICINE

## 2024-10-09 PROCEDURE — 1036F TOBACCO NON-USER: CPT | Performed by: INTERNAL MEDICINE

## 2024-10-09 PROCEDURE — G8484 FLU IMMUNIZE NO ADMIN: HCPCS | Performed by: INTERNAL MEDICINE

## 2024-10-09 PROCEDURE — 3074F SYST BP LT 130 MM HG: CPT | Performed by: INTERNAL MEDICINE

## 2024-10-09 PROCEDURE — 3017F COLORECTAL CA SCREEN DOC REV: CPT | Performed by: INTERNAL MEDICINE

## 2024-10-09 PROCEDURE — 99214 OFFICE O/P EST MOD 30 MIN: CPT | Performed by: INTERNAL MEDICINE

## 2024-10-09 PROCEDURE — G8417 CALC BMI ABV UP PARAM F/U: HCPCS | Performed by: INTERNAL MEDICINE

## 2024-10-09 PROCEDURE — 3078F DIAST BP <80 MM HG: CPT | Performed by: INTERNAL MEDICINE

## 2024-10-09 PROCEDURE — G8427 DOCREV CUR MEDS BY ELIG CLIN: HCPCS | Performed by: INTERNAL MEDICINE

## 2024-10-09 NOTE — ASSESSMENT & PLAN NOTE
Patient has history of intolerance to anticoagulation therapy and has back problems and arthritis requires naproxen and also has significant issues with the cost of these medications will benefit from Watchman as an alternate to long-term anticoagulation therapy.  He does not have any other indications except paroxysmal A-fib and he is status post atrial flutter ablation.

## 2024-10-09 NOTE — PATIENT INSTRUCTIONS
Please be informed that if you contact our office outside of normal business hours the physician on call cannot help with any scheduling or rescheduling issues, procedure instruction questions or any type of medication issue.    We advise you for any urgent/emergency that you go to the nearest emergency room!    PLEASE CALL OUR OFFICE DURING NORMAL BUSINESS HOURS    Monday - Friday   8 am to 5 pm    [place]: [phone]    [place]: [phone]    [place]:  [phone]    **It is YOUR responsibilty to bring medication bottles and/or updated medication list to EACH APPOINTMENT. This will allow us to better serve you and all your healthcare needs**    Thank you for allowing us to care for you today!   We want to ensure we can follow your treatment plan and we strive to give you the best outcomes and experience possible.   If you ever have a life threatening emergency and call 911 - for an ambulance (EMS)   Our providers can only care for you at:   [facility] or [facility].   Even if you have someone take you or you drive yourself we can only care for you in a [facility] facility. Our providers are not setup at the other healthcare locations!   We have discussed their unique stroke and bleeding risk both on and off oral-anticoagulation, and the rationale for this referral.  Based on both stroke and bleeding risk, a shared decision has been made to pursue closure of the left atrial appendage as an alternative to oral anticoagulant therapy for stroke prophylaxis and to reduce their long term risk of incidence of bleeding. NICE tool is used during this office visit to come to this conclusion.

## 2024-10-10 ENCOUNTER — HOSPITAL ENCOUNTER (OUTPATIENT)
Age: 74
Discharge: HOME | End: 2024-10-10
Payer: MEDICARE

## 2024-10-10 ENCOUNTER — HOSPITAL ENCOUNTER (OUTPATIENT)
Dept: GENERAL RADIOLOGY | Age: 74
Discharge: HOME | End: 2024-10-10
Payer: MEDICARE

## 2024-10-10 ENCOUNTER — TELEPHONE (OUTPATIENT)
Age: 74
End: 2024-10-10

## 2024-10-10 ENCOUNTER — NURSE ONLY (OUTPATIENT)
Dept: CARDIOLOGY CLINIC | Age: 74
End: 2024-10-10

## 2024-10-10 DIAGNOSIS — I48.0 PAROXYSMAL ATRIAL FIBRILLATION (HCC): ICD-10-CM

## 2024-10-10 DIAGNOSIS — I48.0 PAROXYSMAL ATRIAL FIBRILLATION (HCC): Primary | ICD-10-CM

## 2024-10-10 DIAGNOSIS — I48.0 PAF (PAROXYSMAL ATRIAL FIBRILLATION) (HCC): ICD-10-CM

## 2024-10-10 LAB
ABO + RH BLD: NORMAL
ANION GAP SERPL CALCULATED.3IONS-SCNC: 9 MMOL/L (ref 9–17)
BLOOD BANK COMMENT: NORMAL
BLOOD BANK SAMPLE EXPIRATION: NORMAL
BLOOD GROUP ANTIBODIES SERPL: NEGATIVE
BUN SERPL-MCNC: 17 MG/DL (ref 7–20)
CALCIUM SERPL-MCNC: 9.3 MG/DL (ref 8.3–10.6)
CHLORIDE SERPL-SCNC: 105 MMOL/L (ref 99–110)
CO2 SERPL-SCNC: 27 MMOL/L (ref 21–32)
CREAT SERPL-MCNC: 0.8 MG/DL (ref 0.8–1.3)
ERYTHROCYTE [DISTWIDTH] IN BLOOD BY AUTOMATED COUNT: 13.1 % (ref 11.7–14.9)
GFR, ESTIMATED: >90 ML/MIN/1.73M2
GLUCOSE SERPL-MCNC: 101 MG/DL (ref 74–99)
HCT VFR BLD AUTO: 44.1 % (ref 42–52)
HGB BLD-MCNC: 14.2 G/DL (ref 13.5–18)
INR PPP: 1.3
MAGNESIUM SERPL-MCNC: 2.3 MG/DL (ref 1.8–2.4)
MCH RBC QN AUTO: 29.8 PG (ref 27–31)
MCHC RBC AUTO-ENTMCNC: 32.2 G/DL (ref 32–36)
MCV RBC AUTO: 92.6 FL (ref 78–100)
PHOSPHATE SERPL-MCNC: 3.4 MG/DL (ref 2.5–4.9)
PLATELET # BLD AUTO: 218 K/UL (ref 140–440)
PMV BLD AUTO: 10.6 FL (ref 7.5–11.1)
POTASSIUM SERPL-SCNC: 4.5 MMOL/L (ref 3.5–5.1)
PROTHROMBIN TIME: 16.5 SEC (ref 11.7–14.5)
RBC # BLD AUTO: 4.76 M/UL (ref 4.6–6.2)
SODIUM SERPL-SCNC: 141 MMOL/L (ref 136–145)
WBC OTHER # BLD: 5 K/UL (ref 4–10.5)

## 2024-10-10 PROCEDURE — 71046 X-RAY EXAM CHEST 2 VIEWS: CPT

## 2024-10-10 PROCEDURE — 86901 BLOOD TYPING SEROLOGIC RH(D): CPT

## 2024-10-10 PROCEDURE — 83735 ASSAY OF MAGNESIUM: CPT

## 2024-10-10 PROCEDURE — 85610 PROTHROMBIN TIME: CPT

## 2024-10-10 PROCEDURE — 85027 COMPLETE CBC AUTOMATED: CPT

## 2024-10-10 PROCEDURE — 36415 COLL VENOUS BLD VENIPUNCTURE: CPT

## 2024-10-10 PROCEDURE — 86850 RBC ANTIBODY SCREEN: CPT

## 2024-10-10 PROCEDURE — 84100 ASSAY OF PHOSPHORUS: CPT

## 2024-10-10 PROCEDURE — 86900 BLOOD TYPING SEROLOGIC ABO: CPT

## 2024-10-10 PROCEDURE — 80048 BASIC METABOLIC PNL TOTAL CA: CPT

## 2024-10-10 NOTE — TELEPHONE ENCOUNTER
Patient called.  Will need to move the Watchman procedure out a couple of weeks.  Watchman rescheduled for October 31 at 0700 per request.  Will follow.  Electronically signed by Venecia Nur RN on 10/10/2024 at 6:02 PM [facility] Care Coordinator

## 2024-10-10 NOTE — PROGRESS NOTES
Patient here in office and educated on 10/10/2024, scheduled for Watchman on 10/17/2024 @ 0700, with arrival @ 0530, @ [facility]; consents signed. Copy of orders given for labs and CXR due 10/10/2024 at [facility]. Instructions given to patient to :NPO after midnight including water the night before procedure; May take rest of morning medications day of procedure except the following; Hold ALL blood pressure medications morning of procedure. Take Xarelto the day before 10/16/2024 in the morning and do not take the day of the procedure. Patient voiced understanding. Copies of consent & info scanned in chart.

## 2024-10-29 ENCOUNTER — TELEPHONE (OUTPATIENT)
Age: 74
End: 2024-10-29

## 2024-10-29 RX ORDER — ASPIRIN 81 MG/1
81 TABLET ORAL DAILY
Qty: 90 TABLET | Refills: 0 | Status: SHIPPED | OUTPATIENT
Start: 2024-10-29

## 2024-10-29 RX ORDER — CLOPIDOGREL BISULFATE 75 MG/1
75 TABLET ORAL DAILY
Qty: 90 TABLET | Refills: 0 | Status: SHIPPED | OUTPATIENT
Start: 2024-10-29

## 2024-10-29 NOTE — TELEPHONE ENCOUNTER
Alyce pre call done.    Medication changes made:    Plavix: Start PM 10/30/2024  ASA: Start PM 10/29/2024  DOAC: Xarelto 1/2 dose am 10/30/2024  (Verified with Dr Ricks)    Patient verbalizes understanding of these changes.    Insurance approval obtained:  [x] Yes     [] No Medicare. No prior auth needed    Shared Decision  Date obtained: 10/9/2024  Physician:Dr ANGELA Canas  [x] Office     [] Media tab    All other questions verbalized answered.  Will follow.    Electronically signed by Venecia Nur RN on 10/29/2024 at 2:12 PM [facility] Coordinator

## 2024-10-30 ENCOUNTER — ANESTHESIA EVENT (OUTPATIENT)
Age: 74
End: 2024-10-30
Payer: MEDICARE

## 2024-10-30 NOTE — ANESTHESIA PRE PROCEDURE
Department of Anesthesiology  Preprocedure Note       Name:  Enrico Herzog   Age:  74 y.o.  :  1950                                          MRN:  [id number]         Date:  10/30/2024      Surgeon: Surgeon(s):  Cristiano Ricks MD    Procedure: Procedure(s):  Watchman cade closure device    Medications prior to admission:   Prior to Admission medications    Medication Sig Start Date End Date Taking? Authorizing Provider   aspirin 81 MG EC tablet Take 1 tablet by mouth daily 10/29/24   Isatu Allison APRN - CNP   clopidogrel (PLAVIX) 75 MG tablet Take 1 tablet by mouth daily 10/29/24   Isatu Allison APRN - CNP   losartan (COZAAR) 50 MG tablet Take 1 tablet by mouth daily 24   Venecia King APRN - CNP   dilTIAZem (CARDIZEM CD) 240 MG extended release capsule Take 1 capsule by mouth daily 24   Venecia King APRN - CNP   rivaroxaban (XARELTO) 20 MG TABS tablet Take 1 tablet by mouth daily 24  Gustavo Drew MD   omeprazole (PRILOSEC) 20 MG delayed release capsule Take 2 capsules by mouth daily    ProviderLuanne MD   sildenafil (VIAGRA) 100 MG tablet Take 1 tablet by mouth daily as needed for Erectile Dysfunction 10/28/21   Ashkan Mendoza PA   ezetimibe (ZETIA) 10 MG tablet Take 1 tablet by mouth daily 21   Enrico Martinez MD   naproxen (NAPROSYN) 500 MG tablet Take 1 tablet by mouth 2 times daily as needed (joint pain) 21   Enrico Martinez MD   Ascorbic Acid (VITAMIN C) 250 MG tablet Take 1 tablet by mouth daily    Luanne hSay MD   Multiple Vitamins-Minerals (HAIR SKIN AND NAILS FORMULA PO) Take 1 tablet by mouth daily     Luanne Shay MD   fexofenadine (ALLEGRA) 180 MG tablet Take 1 tablet by mouth nightly    ProviderLuanne MD       Current medications:    No current facility-administered medications for this encounter.     Current Outpatient Medications   Medication Sig Dispense Refill   • aspirin 81 MG EC tablet Take 1

## 2024-10-31 ENCOUNTER — APPOINTMENT (OUTPATIENT)
Dept: NON INVASIVE DIAGNOSTICS | Age: 74
End: 2024-10-31
Attending: INTERNAL MEDICINE
Payer: MEDICARE

## 2024-10-31 ENCOUNTER — ANESTHESIA (OUTPATIENT)
Age: 74
End: 2024-10-31
Payer: MEDICARE

## 2024-10-31 ENCOUNTER — HOSPITAL ENCOUNTER (INPATIENT)
Age: 74
LOS: 1 days | Discharge: HOME | End: 2024-10-31
Attending: INTERNAL MEDICINE | Admitting: INTERNAL MEDICINE
Payer: MEDICARE

## 2024-10-31 VITALS
HEIGHT: 69 IN | OXYGEN SATURATION: 96 % | SYSTOLIC BLOOD PRESSURE: 117 MMHG | RESPIRATION RATE: 18 BRPM | DIASTOLIC BLOOD PRESSURE: 79 MMHG | TEMPERATURE: 97.6 F | HEART RATE: 74 BPM | BODY MASS INDEX: 33.77 KG/M2 | WEIGHT: 228 LBS

## 2024-10-31 DIAGNOSIS — I48.0 PAF (PAROXYSMAL ATRIAL FIBRILLATION) (HCC): ICD-10-CM

## 2024-10-31 PROBLEM — Z95.818 PRESENCE OF WATCHMAN LEFT ATRIAL APPENDAGE CLOSURE DEVICE: Status: ACTIVE | Noted: 2024-10-31

## 2024-10-31 LAB
ABO/RH: NORMAL
ANTIBODY SCREEN: NEGATIVE
BLOOD BANK COMMENT: NORMAL
BLOOD BANK DISPENSE STATUS: NORMAL
BLOOD BANK SAMPLE EXPIRATION: NORMAL
BPU ID: NORMAL
COMPONENT: NORMAL
CROSSMATCH RESULT: NORMAL
ECHO BSA: 2.24 M2
ECHO BSA: 2.24 M2
TRANSFUSION STATUS: NORMAL
UNIT DIVISION: 0

## 2024-10-31 PROCEDURE — C1760 CLOSURE DEV, VASC: HCPCS | Performed by: INTERNAL MEDICINE

## 2024-10-31 PROCEDURE — 2580000003 HC RX 258

## 2024-10-31 PROCEDURE — 86923 COMPATIBILITY TEST ELECTRIC: CPT

## 2024-10-31 PROCEDURE — C1889 IMPLANT/INSERT DEVICE, NOC: HCPCS | Performed by: INTERNAL MEDICINE

## 2024-10-31 PROCEDURE — 36415 COLL VENOUS BLD VENIPUNCTURE: CPT

## 2024-10-31 PROCEDURE — 2500000003 HC RX 250 WO HCPCS: Performed by: INTERNAL MEDICINE

## 2024-10-31 PROCEDURE — 7100000001 HC PACU RECOVERY - ADDTL 15 MIN: Performed by: INTERNAL MEDICINE

## 2024-10-31 PROCEDURE — 3700000001 HC ADD 15 MINUTES (ANESTHESIA): Performed by: INTERNAL MEDICINE

## 2024-10-31 PROCEDURE — 33340 PERQ CLSR TCAT L ATR APNDGE: CPT | Performed by: INTERNAL MEDICINE

## 2024-10-31 PROCEDURE — 93320 DOPPLER ECHO COMPLETE: CPT | Performed by: INTERNAL MEDICINE

## 2024-10-31 PROCEDURE — 1200000000 HC SEMI PRIVATE

## 2024-10-31 PROCEDURE — C1894 INTRO/SHEATH, NON-LASER: HCPCS | Performed by: INTERNAL MEDICINE

## 2024-10-31 PROCEDURE — C1769 GUIDE WIRE: HCPCS | Performed by: INTERNAL MEDICINE

## 2024-10-31 PROCEDURE — 93312 ECHO TRANSESOPHAGEAL: CPT | Performed by: INTERNAL MEDICINE

## 2024-10-31 PROCEDURE — 6370000000 HC RX 637 (ALT 250 FOR IP): Performed by: INTERNAL MEDICINE

## 2024-10-31 PROCEDURE — 7100000000 HC PACU RECOVERY - FIRST 15 MIN: Performed by: INTERNAL MEDICINE

## 2024-10-31 PROCEDURE — 93312 ECHO TRANSESOPHAGEAL: CPT

## 2024-10-31 PROCEDURE — 86850 RBC ANTIBODY SCREEN: CPT

## 2024-10-31 PROCEDURE — 6360000002 HC RX W HCPCS: Performed by: ANESTHESIOLOGY

## 2024-10-31 PROCEDURE — 2500000003 HC RX 250 WO HCPCS

## 2024-10-31 PROCEDURE — 86901 BLOOD TYPING SEROLOGIC RH(D): CPT

## 2024-10-31 PROCEDURE — 3700000000 HC ANESTHESIA ATTENDED CARE: Performed by: INTERNAL MEDICINE

## 2024-10-31 PROCEDURE — 86900 BLOOD TYPING SEROLOGIC ABO: CPT

## 2024-10-31 PROCEDURE — 2709999900 HC NON-CHARGEABLE SUPPLY: Performed by: INTERNAL MEDICINE

## 2024-10-31 PROCEDURE — 99238 HOSP IP/OBS DSCHRG MGMT 30/<: CPT | Performed by: NURSE PRACTITIONER

## 2024-10-31 PROCEDURE — 7100000010 HC PHASE II RECOVERY - FIRST 15 MIN: Performed by: INTERNAL MEDICINE

## 2024-10-31 PROCEDURE — 7100000011 HC PHASE II RECOVERY - ADDTL 15 MIN: Performed by: INTERNAL MEDICINE

## 2024-10-31 PROCEDURE — 02L73DK OCCLUSION OF LEFT ATRIAL APPENDAGE WITH INTRALUMINAL DEVICE, PERCUTANEOUS APPROACH: ICD-10-PCS | Performed by: INTERNAL MEDICINE

## 2024-10-31 PROCEDURE — 93325 DOPPLER ECHO COLOR FLOW MAPG: CPT | Performed by: INTERNAL MEDICINE

## 2024-10-31 PROCEDURE — B245ZZ4 ULTRASONOGRAPHY OF LEFT HEART, TRANSESOPHAGEAL: ICD-10-PCS | Performed by: INTERNAL MEDICINE

## 2024-10-31 PROCEDURE — 6360000002 HC RX W HCPCS

## 2024-10-31 DEVICE — LEFT ATRIAL APPENDAGE CLOSURE DEVICE WITH DELIVERY SYSTEM
Type: IMPLANTABLE DEVICE | Status: FUNCTIONAL
Brand: WATCHMAN FLX™ PRO

## 2024-10-31 RX ORDER — DIPHENHYDRAMINE HYDROCHLORIDE 50 MG/ML
12.5 INJECTION INTRAMUSCULAR; INTRAVENOUS
Status: DISCONTINUED | OUTPATIENT
Start: 2024-10-31 | End: 2024-10-31 | Stop reason: HOSPADM

## 2024-10-31 RX ORDER — METOCLOPRAMIDE HYDROCHLORIDE 5 MG/ML
10 INJECTION INTRAMUSCULAR; INTRAVENOUS
Status: DISCONTINUED | OUTPATIENT
Start: 2024-10-31 | End: 2024-10-31 | Stop reason: HOSPADM

## 2024-10-31 RX ORDER — SODIUM CHLORIDE 9 MG/ML
INJECTION, SOLUTION INTRAVENOUS
Status: DISCONTINUED | OUTPATIENT
Start: 2024-10-31 | End: 2024-10-31 | Stop reason: SDUPTHER

## 2024-10-31 RX ORDER — PANTOPRAZOLE SODIUM 40 MG/1
40 TABLET, DELAYED RELEASE ORAL DAILY
Qty: 30 TABLET | Refills: 1 | Status: SHIPPED | OUTPATIENT
Start: 2024-10-31

## 2024-10-31 RX ORDER — ONDANSETRON 2 MG/ML
4 INJECTION INTRAMUSCULAR; INTRAVENOUS
Status: COMPLETED | OUTPATIENT
Start: 2024-10-31 | End: 2024-10-31

## 2024-10-31 RX ORDER — SODIUM CHLORIDE, SODIUM LACTATE, POTASSIUM CHLORIDE, CALCIUM CHLORIDE 600; 310; 30; 20 MG/100ML; MG/100ML; MG/100ML; MG/100ML
INJECTION, SOLUTION INTRAVENOUS CONTINUOUS
Status: DISCONTINUED | OUTPATIENT
Start: 2024-10-31 | End: 2024-10-31 | Stop reason: HOSPADM

## 2024-10-31 RX ORDER — SODIUM CHLORIDE 0.9 % (FLUSH) 0.9 %
5-40 SYRINGE (ML) INJECTION PRN
Status: DISCONTINUED | OUTPATIENT
Start: 2024-10-31 | End: 2024-10-31 | Stop reason: HOSPADM

## 2024-10-31 RX ORDER — DEXAMETHASONE SODIUM PHOSPHATE 4 MG/ML
INJECTION, SOLUTION INTRA-ARTICULAR; INTRALESIONAL; INTRAMUSCULAR; INTRAVENOUS; SOFT TISSUE
Status: DISCONTINUED | OUTPATIENT
Start: 2024-10-31 | End: 2024-10-31 | Stop reason: SDUPTHER

## 2024-10-31 RX ORDER — PANTOPRAZOLE SODIUM 40 MG/1
40 TABLET, DELAYED RELEASE ORAL DAILY
Qty: 180 TABLET | Refills: 1 | Status: SHIPPED | OUTPATIENT
Start: 2024-10-31 | End: 2024-10-31

## 2024-10-31 RX ORDER — LABETALOL HYDROCHLORIDE 5 MG/ML
10 INJECTION, SOLUTION INTRAVENOUS
Status: DISCONTINUED | OUTPATIENT
Start: 2024-10-31 | End: 2024-10-31 | Stop reason: HOSPADM

## 2024-10-31 RX ORDER — SODIUM CHLORIDE 0.9 % (FLUSH) 0.9 %
5-40 SYRINGE (ML) INJECTION EVERY 12 HOURS SCHEDULED
Status: DISCONTINUED | OUTPATIENT
Start: 2024-10-31 | End: 2024-10-31 | Stop reason: HOSPADM

## 2024-10-31 RX ORDER — ROCURONIUM BROMIDE 10 MG/ML
INJECTION, SOLUTION INTRAVENOUS
Status: DISCONTINUED | OUTPATIENT
Start: 2024-10-31 | End: 2024-10-31 | Stop reason: SDUPTHER

## 2024-10-31 RX ORDER — LIDOCAINE HYDROCHLORIDE 20 MG/ML
INJECTION, SOLUTION INTRAVENOUS
Status: DISCONTINUED | OUTPATIENT
Start: 2024-10-31 | End: 2024-10-31 | Stop reason: SDUPTHER

## 2024-10-31 RX ORDER — ACETAMINOPHEN 325 MG/1
650 TABLET ORAL
Status: DISCONTINUED | OUTPATIENT
Start: 2024-10-31 | End: 2024-10-31 | Stop reason: HOSPADM

## 2024-10-31 RX ORDER — NALOXONE HYDROCHLORIDE 0.4 MG/ML
INJECTION, SOLUTION INTRAMUSCULAR; INTRAVENOUS; SUBCUTANEOUS PRN
Status: DISCONTINUED | OUTPATIENT
Start: 2024-10-31 | End: 2024-10-31 | Stop reason: HOSPADM

## 2024-10-31 RX ORDER — SODIUM CHLORIDE 9 MG/ML
INJECTION, SOLUTION INTRAVENOUS PRN
Status: DISCONTINUED | OUTPATIENT
Start: 2024-10-31 | End: 2024-10-31 | Stop reason: HOSPADM

## 2024-10-31 RX ORDER — CLOPIDOGREL BISULFATE 75 MG/1
75 TABLET ORAL DAILY
Status: DISCONTINUED | OUTPATIENT
Start: 2024-10-31 | End: 2024-10-31 | Stop reason: HOSPADM

## 2024-10-31 RX ORDER — ONDANSETRON 2 MG/ML
INJECTION INTRAMUSCULAR; INTRAVENOUS
Status: DISCONTINUED | OUTPATIENT
Start: 2024-10-31 | End: 2024-10-31 | Stop reason: SDUPTHER

## 2024-10-31 RX ORDER — PROTAMINE SULFATE 10 MG/ML
INJECTION, SOLUTION INTRAVENOUS
Status: DISCONTINUED | OUTPATIENT
Start: 2024-10-31 | End: 2024-10-31 | Stop reason: SDUPTHER

## 2024-10-31 RX ORDER — ASPIRIN 81 MG/1
81 TABLET, CHEWABLE ORAL DAILY
Status: DISCONTINUED | OUTPATIENT
Start: 2024-10-31 | End: 2024-10-31 | Stop reason: HOSPADM

## 2024-10-31 RX ORDER — PROPOFOL 10 MG/ML
INJECTION, EMULSION INTRAVENOUS
Status: DISCONTINUED | OUTPATIENT
Start: 2024-10-31 | End: 2024-10-31 | Stop reason: SDUPTHER

## 2024-10-31 RX ORDER — VANCOMYCIN HYDROCHLORIDE 1 G/20ML
INJECTION, POWDER, LYOPHILIZED, FOR SOLUTION INTRAVENOUS
Status: DISCONTINUED | OUTPATIENT
Start: 2024-10-31 | End: 2024-10-31 | Stop reason: SDUPTHER

## 2024-10-31 RX ORDER — HEPARIN SODIUM 1000 [USP'U]/ML
INJECTION, SOLUTION INTRAVENOUS; SUBCUTANEOUS
Status: DISCONTINUED | OUTPATIENT
Start: 2024-10-31 | End: 2024-10-31 | Stop reason: SDUPTHER

## 2024-10-31 RX ADMIN — ASPIRIN 81 MG 81 MG: 81 TABLET ORAL at 06:07

## 2024-10-31 RX ADMIN — SUGAMMADEX 200 MG: 100 INJECTION, SOLUTION INTRAVENOUS at 08:00

## 2024-10-31 RX ADMIN — ONDANSETRON 4 MG: 2 INJECTION INTRAMUSCULAR; INTRAVENOUS at 08:22

## 2024-10-31 RX ADMIN — HEPARIN SODIUM 12000 UNITS: 1000 INJECTION, SOLUTION INTRAVENOUS; SUBCUTANEOUS at 07:34

## 2024-10-31 RX ADMIN — PROPOFOL 150 MG: 10 INJECTION, EMULSION INTRAVENOUS at 07:13

## 2024-10-31 RX ADMIN — VANCOMYCIN HYDROCHLORIDE 1000 MG: 1 INJECTION, POWDER, LYOPHILIZED, FOR SOLUTION INTRAVENOUS at 07:12

## 2024-10-31 RX ADMIN — DEXAMETHASONE SODIUM PHOSPHATE 4 MG: 4 INJECTION, SOLUTION INTRAMUSCULAR; INTRAVENOUS at 07:28

## 2024-10-31 RX ADMIN — ONDANSETRON 4 MG: 2 INJECTION INTRAMUSCULAR; INTRAVENOUS at 07:28

## 2024-10-31 RX ADMIN — LIDOCAINE HYDROCHLORIDE 50 MG: 20 INJECTION, SOLUTION INTRAVENOUS at 07:13

## 2024-10-31 RX ADMIN — ROCURONIUM BROMIDE 50 MG: 10 INJECTION, SOLUTION INTRAVENOUS at 07:13

## 2024-10-31 RX ADMIN — CLOPIDOGREL BISULFATE 75 MG: 75 TABLET ORAL at 06:07

## 2024-10-31 RX ADMIN — PROTAMINE SULFATE 30 MG: 10 INJECTION, SOLUTION INTRAVENOUS at 07:54

## 2024-10-31 RX ADMIN — SODIUM CHLORIDE: 900 INJECTION INTRAVENOUS at 07:13

## 2024-10-31 ASSESSMENT — ENCOUNTER SYMPTOMS
COLOR CHANGE: 0
PHOTOPHOBIA: 0
SHORTNESS OF BREATH: 0
EYE PAIN: 0
COUGH: 0
WHEEZING: 0
NAUSEA: 0
BACK PAIN: 0
BLOOD IN STOOL: 0
CHEST TIGHTNESS: 0
CONSTIPATION: 0
VOMITING: 0
DIARRHEA: 0
ABDOMINAL PAIN: 0

## 2024-10-31 ASSESSMENT — PAIN DESCRIPTION - FREQUENCY: FREQUENCY: INTERMITTENT

## 2024-10-31 ASSESSMENT — PAIN SCALES - GENERAL
PAINLEVEL_OUTOF10: 0
PAINLEVEL_OUTOF10: 1

## 2024-10-31 ASSESSMENT — PAIN DESCRIPTION - PAIN TYPE: TYPE: OTHER (COMMENT);ACUTE PAIN

## 2024-10-31 ASSESSMENT — PAIN DESCRIPTION - LOCATION: LOCATION: THROAT

## 2024-10-31 ASSESSMENT — PAIN DESCRIPTION - DESCRIPTORS: DESCRIPTORS: SORE

## 2024-10-31 ASSESSMENT — PAIN DESCRIPTION - ORIENTATION: ORIENTATION: LOWER

## 2024-10-31 ASSESSMENT — PAIN - FUNCTIONAL ASSESSMENT: PAIN_FUNCTIONAL_ASSESSMENT: ACTIVITIES ARE NOT PREVENTED

## 2024-10-31 ASSESSMENT — PAIN DESCRIPTION - ONSET: ONSET: AWAKENED FROM SLEEP

## 2024-10-31 NOTE — H&P
Electrophysiology H&p  Note      Reason for consultation:  Atrial flutter    Chief complaint :  here for watchman implantation    Referring physician: Dr. Drew      Primary care physician: Xena English APRN - CNP      History of Present Illness:     Today visit (10/31/24)    Patient here for watchman implantation. No change in H&P noted from previous clinic visit.     Previous visit (10/2/2024)      Chief Complaint   Patient presents with    Follow-up     Patient denies chest pain, shortness of breath, palpitations, dizziness, syncope. Patient denies tobacco and caffeine. Patient states he drinks 1-2 mixed alcohol drinks. Patient states he recently joined a Gym and has been exercising regularly.    Patient is here to discuss watchman as he is concerned about being on anticoagulation  Recently had an episode of atrial fibrillation and he had palpitation. He is now back on anticoagulation - on xarelto.            Previous visit:     Patient is 71-year-old male with a history of hypertension, hyperlipidemia referred by Dr. Drew for atrial flutter.  Patient reports that recently he went to the emergency room with chest pain and palpitations and was noted to be in atrial flutter.  Patient was referred to me for further management.  Patient reports he does not feel any symptoms now but when he had the arrhythmia he was very symptomatic.  Patient denies drinking alcohol or smoking.  Patient reports drinking coffee every morning.  Patient does exercise      Pastmedical history:   Past Medical History:   Diagnosis Date    Basal cell carcinoma of skin     RIGHT ANTERIOR SHOULDER    Benign prostatic hyperplasia     Chronic sinusitis     GERD (gastroesophageal reflux disease)     H/O seasonal allergies     Low back pain     Pancreatic abnormality     INCREASED PANCREATIC LIPASE    Paroxysmal atrial fibrillation (HCC) 05/03/2022    S/P discectomy for herniated nucleus  sounds: No murmur heard.  Pulmonary:      Effort: Pulmonary effort is normal.      Breath sounds: No rales.   Abdominal:      General: Abdomen is flat.      Palpations: Abdomen is soft.   Musculoskeletal:         General: No tenderness. Normal range of motion.      Cervical back: Normal range of motion.      Right lower leg: No edema.      Left lower leg: No edema.   Skin:     General: Skin is warm and dry.   Neurological:      General: No focal deficit present.      Mental Status: He is alert and oriented to person, place, and time.           CBC:   Lab Results   Component Value Date/Time    WBC 5.0 10/10/2024 12:28 PM    HGB 14.2 10/10/2024 12:28 PM    HCT 44.1 10/10/2024 12:28 PM     10/10/2024 12:28 PM     Lipids:  Lab Results   Component Value Date    CHOL 164 10/01/2021    TRIG 53 10/01/2021    HDL 73 (H) 10/01/2021    LDLDIRECT 115 (H) 09/17/2020     PT/INR:   Lab Results   Component Value Date/Time    INR 1.3 10/10/2024 12:28 PM        BMP:    Lab Results   Component Value Date     10/10/2024    K 4.5 10/10/2024     10/10/2024    CO2 27 10/10/2024    BUN 17 10/10/2024     CMP:   Lab Results   Component Value Date    AST 19 11/26/2021    ALT 22 11/26/2021    BILITOT 0.2 11/26/2021    ALKPHOS 79 11/26/2021     TSH:  No results found for: \"TSH\", \"T4\"    EKGINTERPRETATION - EKG Interpretation:  Sinus rhythm       IMPRESSION / RECOMMENDATIONS:     PAF  History of atrial flutter ablation  Hematuria needing to stop anticoagulation and interrupted anticoagulation because of recurrent hematuria on anticoagulation  HTN  HLD  Obesity BMI 34  ? Vascular disease  Hypercoagulable secondary to atrial fibrillation       Patient with PAF episodes with RVR.      Patient had a previous atrial flutter ablation and after that he had hematuria on anticoagulation which she needed to stop the anticoagulation.      On the event monitor he did not have any A-fib at that time so we safely stop the anticoagulation

## 2024-10-31 NOTE — DISCHARGE SUMMARY
[facility]  Discharge Summary    Enrico Herzog  :  1950  MRN:  [id number]    Admit date:  10/31/2024  Discharge date:      Admitting Physician:  Cristiano Ricks MD    Discharge Diagnoses:     1. Sp Watchman device procedure  2. PAF  3, Hypercoagulable due to PAF          Admission Condition:  fair    Discharged Condition:  good    Hospital Course:   Patient with hx of PAF, HTN, history of atrial flutter ablation, Vascular disease, frequent hematuria on anticoagulation with CHADS-VAS score of 3 and Has bled score of 3 with high risk of bleeding with anticoagulation and high risk of stroke with out anticoagulation here for HARESH closure device watchman implantation. Patient tolerated the procedure well. Observed overnight. Patient groin access site was clean, no hematoma and no tenderness, No bruit. Echo showed no pericardial effusion.  Patient stable for discharge with out patient follow up.    Patient given instructions of continuing anticoagulation and aspirin for 45 days  Then he will get a ELISA at 45 days and based on the ELISA results his anticoagulation course will be decided    Patient is doing well following WATCHMAN implant.  Ambulating without any issues      Discharge Medications:      Current Discharge Medication List             Details   aspirin 81 MG EC tablet Take 1 tablet by mouth daily  Qty: 90 tablet, Refills: 0      clopidogrel (PLAVIX) 75 MG tablet Take 1 tablet by mouth daily  Qty: 90 tablet, Refills: 0      losartan (COZAAR) 50 MG tablet Take 1 tablet by mouth daily  Qty: 90 tablet, Refills: 3    Associated Diagnoses: Essential hypertension      dilTIAZem (CARDIZEM CD) 240 MG extended release capsule Take 1 capsule by mouth daily  Qty: 90 capsule, Refills: 3      omeprazole (PRILOSEC) 20 MG delayed release capsule Take 2 capsules by mouth daily      sildenafil (VIAGRA) 100 MG tablet Take 1 tablet by mouth daily as needed for Erectile Dysfunction  Qty: 30 tablet, Refills: 1      ezetimibe

## 2024-10-31 NOTE — DISCHARGE INSTRUCTIONS
Resume aspirin and plavix tomorrow  STOP the Xarelto       Discharge Home Instuctions  Name:Enrico Herzog  :1950    Your instructions:    Shower only for the first 5 days, NO TUB BATHS.      Wash procedure site with mild soap, rinse and pat dry.  Do not rub over the procedure site for two (2) days.  Remove dressing and replace with a band-aid in 2 days.    Site observations-Observe the area for bleeding or hematoma (lump under the skin caused by bleeding.)      A.  Painless bruising is normal.  B.  If a firmness/swelling seems to be increasing or there is bright red bleeding from site       (hold pressure over procedure site) and  CALL 911 IMMEDIATELY.    What to do after you leave the hospital:    Recommended activity: no lifting, Driving, or Strenuous exercise for 3 days.  DO NOT lift more than 10lbs.    For your procedure you may have been given a sedative to help you relax. This drug will make you sleepy. It is usually given in a vein (by IV).  Don't do anything for 24 hours that requires attention to detail. It takes time for the medicine effects to completely wear off.  Do not sign any legally binding contracts or documents over the next 24 hours.  For your safety, you should not drive or operate any machinery that could be dangerous until the medicine wears off and you can think clearly and react easily.    Follow-up with physician in 7-10 days.    Take your medication as ordered.      If you have any questions, you may call [phone] or your physicians office

## 2024-10-31 NOTE — PROGRESS NOTES
0811- pt. Arrived to pacu via cart from EP lab. Pt. Attached to monitor and alarms are on. Received report from BELEN Og and ETHEL Cuadra via telephone. Pt. Is drowsy from anesthesia but responds to verbal stimuli and able to follow commands. Pt. Denies pain or n/v. Right groin site is clean dry and intact. Site soft and no hematoma noted. Pt. Skin warm and dry. Bilateral pedal and post tib pulses +2. Pt. Arrived wearing 6L via simple mask and SR on the monitor. IV infusing without any issues.   0822- pt. States has some nausea. Pt. Medicated for nausea. See MAR.   0845- pt. Is aox4. Pt. Denies pain or n/v.  Right groin site is clean dry and intact. Site is soft. No hematoma noted. Pt. Has tolerated ice chips. States has a slight sore throat. Pt. Updated on plan of care and denies any other questions or concerns.   0855- gave bedside report to ETHEL Cameron. Right groin site checked. Dressing clean dry and intact. Site soft. No hematoma.

## 2024-10-31 NOTE — PLAN OF CARE
All discharge instructions explained to the patient at this time. No bleeding or hematoma noted along site. Patient walked to the bathroom without difficulty and IV's removed per discharge orders. Patient denies any additional needs and all vitals stable for discharge home. Patients wife Sukumar went down to outpatient pharmacy to retrieve new protonix medication. Sierra Carvalho RN 10/31/2024

## 2024-10-31 NOTE — PLAN OF CARE
Patient decided to want to walk to the elevator and was escorted to main entrance by this nurse. Sierra Carvalho RN 10/31/2024

## 2024-11-01 ENCOUNTER — TELEPHONE (OUTPATIENT)
Age: 74
End: 2024-11-01

## 2024-11-01 NOTE — TELEPHONE ENCOUNTER
Post [facility] day 1 call done.   Patient states they are doing well. .   Groin has no bruising or hematoma present.   Patient is taking anticoagulants as prescribed.   Denies c/o or needs.  All questions answered.   Will call me if questions arise.    Electronically signed by Venecia Nur RN on 11/1/2024 at 10:44 AM [facility] Care Coordinator

## 2024-11-01 NOTE — ANESTHESIA POSTPROCEDURE EVALUATION
Department of Anesthesiology  Postprocedure Note    Patient: Enrico Herzog  MRN: [id number]  YOB: 1950  Date of evaluation: 11/1/2024    Procedure Summary       Date: 10/31/24 Room / Location: [facility] CATH LAB  / [facility] CARDIAC CATH LAB    Anesthesia Start: 0705 Anesthesia Stop: 0813    Procedure: Watchman cade closure device Diagnosis:       PAF (paroxysmal atrial fibrillation) (HCC)      (PAF (paroxysmal atrial fibrillation) (HCC) [I48.0])    Providers: Cristiano Ricks MD Responsible Provider: Jesse Mccracken MD    Anesthesia Type: General ASA Status: 3            Anesthesia Type: General    Desire Phase I: Desire Score: 10    Desire Phase II:      Anesthesia Post Evaluation    Patient location during evaluation: bedside  Patient participation: complete - patient participated  Level of consciousness: awake and alert  Airway patency: patent  Nausea & Vomiting: no nausea and no vomiting  Cardiovascular status: hemodynamically stable  Respiratory status: acceptable  Hydration status: euvolemic  Pain management: adequate        There were no known notable events for this encounter.

## 2024-11-05 ENCOUNTER — TELEPHONE (OUTPATIENT)
Dept: CARDIOLOGY CLINIC | Age: 74
End: 2024-11-05

## 2024-11-05 NOTE — TELEPHONE ENCOUNTER
Madeleine with Dr[facility] called in. Pt is having a procedure done on 11/13. With pt just having a watchman put in she is wanting to make sure pt is ok to get this procedure done and if so he will need antibiotic. Their office usually recommends 4 500mg of Amoxicillin.

## 2024-11-06 NOTE — TELEPHONE ENCOUNTER
Per VALERIA Allison NP, patient cannot hold blood thinner. May otherwise have procedure with 2 gm Amoxicillin. Sofiya notified

## 2024-11-07 ENCOUNTER — TELEPHONE (OUTPATIENT)
Dept: CARDIOLOGY CLINIC | Age: 74
End: 2024-11-07

## 2024-11-07 ENCOUNTER — OFFICE VISIT (OUTPATIENT)
Dept: CARDIOLOGY CLINIC | Age: 74
End: 2024-11-07

## 2024-11-07 VITALS
HEIGHT: 70 IN | HEART RATE: 67 BPM | SYSTOLIC BLOOD PRESSURE: 136 MMHG | WEIGHT: 235.2 LBS | BODY MASS INDEX: 33.67 KG/M2 | DIASTOLIC BLOOD PRESSURE: 76 MMHG

## 2024-11-07 DIAGNOSIS — I48.0 PAF (PAROXYSMAL ATRIAL FIBRILLATION) (HCC): ICD-10-CM

## 2024-11-07 DIAGNOSIS — D68.69 HYPERCOAGULABLE STATE DUE TO PAROXYSMAL ATRIAL FIBRILLATION (HCC): ICD-10-CM

## 2024-11-07 DIAGNOSIS — I48.0 HYPERCOAGULABLE STATE DUE TO PAROXYSMAL ATRIAL FIBRILLATION (HCC): ICD-10-CM

## 2024-11-07 DIAGNOSIS — Z95.818 PRESENCE OF WATCHMAN LEFT ATRIAL APPENDAGE CLOSURE DEVICE: Primary | ICD-10-CM

## 2024-11-07 DIAGNOSIS — I10 ESSENTIAL HYPERTENSION: Chronic | ICD-10-CM

## 2024-11-07 ASSESSMENT — ENCOUNTER SYMPTOMS
COLOR CHANGE: 0
PHOTOPHOBIA: 0
SINUS PAIN: 0
ABDOMINAL PAIN: 0
SHORTNESS OF BREATH: 0
ABDOMINAL DISTENTION: 0
BACK PAIN: 0
BLOOD IN STOOL: 0
SINUS PRESSURE: 0
COUGH: 0

## 2024-11-07 NOTE — PATIENT INSTRUCTIONS
Please be informed that if you contact our office outside of normal business hours the physician on call cannot help with any scheduling or rescheduling issues, procedure instruction questions or any type of medication issue.    We advise you for any urgent/emergency that you go to the nearest emergency room!    PLEASE CALL OUR OFFICE DURING NORMAL BUSINESS HOURS    Monday - Friday   8 am to 5 pm    [place]: [phone]    [place]: [phone]    [place]:  [phone]    **It is YOUR responsibilty to bring medication bottles and/or updated medication list to EACH APPOINTMENT. This will allow us to better serve you and all your healthcare needs**    Thank you for allowing us to care for you today!   We want to ensure we can follow your treatment plan and we strive to give you the best outcomes and experience possible.   If you ever have a life threatening emergency and call 911 - for an ambulance (EMS)   Our providers can only care for you at:   [facility] or [facility].   Even if you have someone take you or you drive yourself we can only care for you in a [facility] facility. Our providers are not setup at the other healthcare locations!

## 2024-11-07 NOTE — PROGRESS NOTES
Electrophysiology Follow Up  Note      Reason for consultation:  Atrial flutter    Chief complaint :  1 week follow up on watchman    Referring physician: Dr. Drew      Primary care physician: Xena English APRN - CNP      History of Present Illness:     This visit 11/7/2024  Patient here today for 1 week follow-up status post watchman.   He is feeling well.  He denies chest pain, palpitations, shortness of breath, lightheadedness, dizziness, edema or syncope.    Previous visit (10/2/2024)      Chief Complaint   Patient presents with    Follow-up     Patient denies chest pain, shortness of breath, palpitations, dizziness, syncope. Patient denies tobacco and caffeine. Patient states he drinks 1-2 mixed alcohol drinks. Patient states he recently joined a Gym and has been exercising regularly.    Patient is here to discuss watchman as he is concerned about being on anticoagulation  Recently had an episode of atrial fibrillation and he had palpitation. He is now back on anticoagulation - on xarelto.            Previous visit:     Patient is 71-year-old male with a history of hypertension, hyperlipidemia referred by Dr. Drew for atrial flutter.  Patient reports that recently he went to the emergency room with chest pain and palpitations and was noted to be in atrial flutter.  Patient was referred to me for further management.  Patient reports he does not feel any symptoms now but when he had the arrhythmia he was very symptomatic.  Patient denies drinking alcohol or smoking.  Patient reports drinking coffee every morning.  Patient does exercise      Pastmedical history:   Past Medical History:   Diagnosis Date    Basal cell carcinoma of skin     RIGHT ANTERIOR SHOULDER    Benign prostatic hyperplasia     Chronic sinusitis     GERD (gastroesophageal reflux disease)     H/O seasonal allergies     Low back pain     Pancreatic abnormality     INCREASED PANCREATIC LIPASE

## 2024-11-07 NOTE — TELEPHONE ENCOUNTER
Spoke to patient and informed him that he may restart working out at the gym, per Isatu Allison NP.    Patient advised understanding.

## 2024-11-15 ENCOUNTER — TELEPHONE (OUTPATIENT)
Dept: CARDIOLOGY CLINIC | Age: 74
End: 2024-11-15

## 2024-11-15 NOTE — TELEPHONE ENCOUNTER
Spoke with Patient and informed him of Procedure dates and times.    ELISA: 12/16/24 @ 1000  PPW: 12/9/24 @ 1130    Patient advised understanding.

## 2024-11-16 DIAGNOSIS — I48.0 PAF (PAROXYSMAL ATRIAL FIBRILLATION) (HCC): Primary | ICD-10-CM

## 2024-11-27 LAB — ACTIVATED CLOTTING TIME, LOW RANGE: 398 SEC (ref 89–169)

## 2024-12-09 ENCOUNTER — HOSPITAL ENCOUNTER (OUTPATIENT)
Age: 74
Discharge: HOME | End: 2024-12-09
Payer: MEDICARE

## 2024-12-09 ENCOUNTER — NURSE ONLY (OUTPATIENT)
Dept: CARDIOLOGY CLINIC | Age: 74
End: 2024-12-09

## 2024-12-09 DIAGNOSIS — I48.0 PAF (PAROXYSMAL ATRIAL FIBRILLATION) (HCC): ICD-10-CM

## 2024-12-09 DIAGNOSIS — Z95.818 PRESENCE OF WATCHMAN LEFT ATRIAL APPENDAGE CLOSURE DEVICE: Primary | ICD-10-CM

## 2024-12-09 LAB
ANION GAP SERPL CALCULATED.3IONS-SCNC: 9 MMOL/L (ref 9–17)
BUN SERPL-MCNC: 19 MG/DL (ref 7–20)
CALCIUM SERPL-MCNC: 9.4 MG/DL (ref 8.3–10.6)
CHLORIDE SERPL-SCNC: 105 MMOL/L (ref 99–110)
CO2 SERPL-SCNC: 27 MMOL/L (ref 21–32)
CREAT SERPL-MCNC: 0.9 MG/DL (ref 0.8–1.3)
ERYTHROCYTE [DISTWIDTH] IN BLOOD BY AUTOMATED COUNT: 12.8 % (ref 11.7–14.9)
GFR, ESTIMATED: 88 ML/MIN/1.73M2
GLUCOSE SERPL-MCNC: 93 MG/DL (ref 74–99)
HCT VFR BLD AUTO: 45.9 % (ref 42–52)
HGB BLD-MCNC: 14.9 G/DL (ref 13.5–18)
INR PPP: 1
MAGNESIUM SERPL-MCNC: 2.2 MG/DL (ref 1.8–2.4)
MCH RBC QN AUTO: 29.4 PG (ref 27–31)
MCHC RBC AUTO-ENTMCNC: 32.5 G/DL (ref 32–36)
MCV RBC AUTO: 90.7 FL (ref 78–100)
PARTIAL THROMBOPLASTIN TIME: 28 SEC (ref 25.1–37.1)
PHOSPHATE SERPL-MCNC: 3.7 MG/DL (ref 2.5–4.9)
PLATELET # BLD AUTO: 216 K/UL (ref 140–440)
PMV BLD AUTO: 10.8 FL (ref 7.5–11.1)
POTASSIUM SERPL-SCNC: 4.5 MMOL/L (ref 3.5–5.1)
PROTHROMBIN TIME: 13.8 SEC (ref 11.7–14.5)
RBC # BLD AUTO: 5.06 M/UL (ref 4.6–6.2)
SODIUM SERPL-SCNC: 140 MMOL/L (ref 136–145)
WBC OTHER # BLD: 5.6 K/UL (ref 4–10.5)

## 2024-12-09 PROCEDURE — 83735 ASSAY OF MAGNESIUM: CPT

## 2024-12-09 PROCEDURE — 84100 ASSAY OF PHOSPHORUS: CPT

## 2024-12-09 PROCEDURE — 85730 THROMBOPLASTIN TIME PARTIAL: CPT

## 2024-12-09 PROCEDURE — 80048 BASIC METABOLIC PNL TOTAL CA: CPT

## 2024-12-09 PROCEDURE — 36415 COLL VENOUS BLD VENIPUNCTURE: CPT

## 2024-12-09 PROCEDURE — 85027 COMPLETE CBC AUTOMATED: CPT

## 2024-12-09 PROCEDURE — 85610 PROTHROMBIN TIME: CPT

## 2024-12-09 NOTE — PROGRESS NOTES
Patient here in office and educated on 12/9/24, scheduled for Transesophageal Echocardiogram on 12/16/24 @ 0700, with arrival @ 0600, @ [facility]; consents signed. Copy of orders given for labs due 12/9/24 at [facility]. Instructions given to patient to :NPO after midnight including water the night before procedure; call hospital at [phone] to pre-register. May take rest of morning medications day of procedure. Patient voiced understanding. Copies of consent & info scanned in chart.

## 2024-12-13 NOTE — H&P
HISTORY & PHYSICAL        CHIEF COMPLAINT: Transesophageal echocardiographic study post Watchman device placement.    HISTORY OF PRESENT ILLNESS:  Enrico is a 74 y.o. male status post watchman, here for Transesophageal echocardiographic study post Watchman device placement.     Enrico Herzog has the following history recorded in [facility]:  Patient Active Problem List    Diagnosis Date Noted    Presence of Watchman left atrial appendage closure device 10/31/2024    Hematuria 06/14/2022    Paroxysmal atrial fibrillation (HCC) 05/03/2022    PAF (paroxysmal atrial fibrillation) (HCC) 10/10/2024    S/P ablation of atrial flutter 12/21/2021    Other hyperlipidemia 03/11/2021    Non-seasonal allergic rhinitis due to pollen 03/11/2021    Primary osteoarthritis of both knees 03/11/2021    Other male erectile dysfunction 03/11/2021    Class 2 obesity due to excess calories with body mass index (BMI) of 36.0 to 36.9 in adult 09/11/2020    Essential hypertension 09/13/2019    GERD (gastroesophageal reflux disease)     Low back pain     Basal cell carcinoma of skin     Chronic sinusitis     S/P discectomy for herniated nucleus pulposus     Benign prostatic hyperplasia      Current Outpatient Medications   Medication Sig Dispense Refill    pantoprazole (PROTONIX) 40 MG tablet Take 1 tablet by mouth daily 30 tablet 1    aspirin 81 MG EC tablet Take 1 tablet by mouth daily 90 tablet 0    clopidogrel (PLAVIX) 75 MG tablet Take 1 tablet by mouth daily 90 tablet 0    losartan (COZAAR) 50 MG tablet Take 1 tablet by mouth daily 90 tablet 3    dilTIAZem (CARDIZEM CD) 240 MG extended release capsule Take 1 capsule by mouth daily 90 capsule 3    sildenafil (VIAGRA) 100 MG tablet Take 1 tablet by mouth daily as needed for Erectile Dysfunction 30 tablet 1    ezetimibe (ZETIA) 10 MG tablet Take 1 tablet by mouth daily 30 tablet 3    naproxen (NAPROSYN)

## 2024-12-16 ENCOUNTER — HOSPITAL ENCOUNTER (OUTPATIENT)
Dept: NON INVASIVE DIAGNOSTICS | Age: 74
Discharge: HOME | End: 2024-12-18
Attending: INTERNAL MEDICINE
Payer: MEDICARE

## 2024-12-16 VITALS
BODY MASS INDEX: 33.64 KG/M2 | OXYGEN SATURATION: 96 % | HEART RATE: 71 BPM | SYSTOLIC BLOOD PRESSURE: 129 MMHG | HEIGHT: 70 IN | RESPIRATION RATE: 17 BRPM | TEMPERATURE: 97.9 F | WEIGHT: 235 LBS | DIASTOLIC BLOOD PRESSURE: 87 MMHG

## 2024-12-16 DIAGNOSIS — I48.0 PAF (PAROXYSMAL ATRIAL FIBRILLATION) (HCC): ICD-10-CM

## 2024-12-16 LAB — ECHO BSA: 2.29 M2

## 2024-12-16 PROCEDURE — 93320 DOPPLER ECHO COMPLETE: CPT | Performed by: INTERNAL MEDICINE

## 2024-12-16 PROCEDURE — 93312 ECHO TRANSESOPHAGEAL: CPT | Performed by: INTERNAL MEDICINE

## 2024-12-16 PROCEDURE — 7100000011 HC PHASE II RECOVERY - ADDTL 15 MIN: Performed by: INTERNAL MEDICINE

## 2024-12-16 PROCEDURE — 6360000002 HC RX W HCPCS: Performed by: INTERNAL MEDICINE

## 2024-12-16 PROCEDURE — 6370000000 HC RX 637 (ALT 250 FOR IP): Performed by: INTERNAL MEDICINE

## 2024-12-16 PROCEDURE — 93325 DOPPLER ECHO COLOR FLOW MAPG: CPT | Performed by: INTERNAL MEDICINE

## 2024-12-16 PROCEDURE — 7100000010 HC PHASE II RECOVERY - FIRST 15 MIN: Performed by: INTERNAL MEDICINE

## 2024-12-16 PROCEDURE — 93325 DOPPLER ECHO COLOR FLOW MAPG: CPT

## 2024-12-16 PROCEDURE — 99152 MOD SED SAME PHYS/QHP 5/>YRS: CPT | Performed by: INTERNAL MEDICINE

## 2024-12-16 RX ORDER — LIDOCAINE HYDROCHLORIDE 20 MG/ML
SOLUTION OROPHARYNGEAL PRN
Status: COMPLETED | OUTPATIENT
Start: 2024-12-16 | End: 2024-12-16

## 2024-12-16 RX ORDER — FENTANYL CITRATE 50 UG/ML
INJECTION, SOLUTION INTRAMUSCULAR; INTRAVENOUS PRN
Status: COMPLETED | OUTPATIENT
Start: 2024-12-16 | End: 2024-12-16

## 2024-12-16 RX ORDER — MIDAZOLAM HYDROCHLORIDE 1 MG/ML
INJECTION, SOLUTION INTRAMUSCULAR; INTRAVENOUS PRN
Status: COMPLETED | OUTPATIENT
Start: 2024-12-16 | End: 2024-12-16

## 2024-12-16 RX ADMIN — MIDAZOLAM 1 MG: 1 INJECTION INTRAMUSCULAR; INTRAVENOUS at 07:19

## 2024-12-16 RX ADMIN — MIDAZOLAM 2 MG: 1 INJECTION INTRAMUSCULAR; INTRAVENOUS at 07:16

## 2024-12-16 RX ADMIN — FENTANYL CITRATE 25 MCG: 50 INJECTION, SOLUTION INTRAMUSCULAR; INTRAVENOUS at 07:20

## 2024-12-16 RX ADMIN — LIDOCAINE HYDROCHLORIDE 15 ML: 20 SOLUTION ORAL at 07:09

## 2025-02-03 RX ORDER — CLOPIDOGREL BISULFATE 75 MG/1
75 TABLET ORAL DAILY
Qty: 90 TABLET | Refills: 0 | Status: SHIPPED | OUTPATIENT
Start: 2025-02-03

## 2025-05-14 ENCOUNTER — OFFICE VISIT (OUTPATIENT)
Age: 75
End: 2025-05-14
Payer: MEDICARE

## 2025-05-14 VITALS
SYSTOLIC BLOOD PRESSURE: 128 MMHG | HEIGHT: 70 IN | WEIGHT: 236.4 LBS | BODY MASS INDEX: 33.84 KG/M2 | DIASTOLIC BLOOD PRESSURE: 76 MMHG | HEART RATE: 78 BPM

## 2025-05-14 DIAGNOSIS — I10 ESSENTIAL HYPERTENSION: Chronic | ICD-10-CM

## 2025-05-14 DIAGNOSIS — Z95.818 PRESENCE OF WATCHMAN LEFT ATRIAL APPENDAGE CLOSURE DEVICE: Primary | ICD-10-CM

## 2025-05-14 DIAGNOSIS — I48.0 PAF (PAROXYSMAL ATRIAL FIBRILLATION) (HCC): ICD-10-CM

## 2025-05-14 PROCEDURE — 1036F TOBACCO NON-USER: CPT | Performed by: NURSE PRACTITIONER

## 2025-05-14 PROCEDURE — G8417 CALC BMI ABV UP PARAM F/U: HCPCS | Performed by: NURSE PRACTITIONER

## 2025-05-14 PROCEDURE — 3074F SYST BP LT 130 MM HG: CPT | Performed by: NURSE PRACTITIONER

## 2025-05-14 PROCEDURE — 1159F MED LIST DOCD IN RCRD: CPT | Performed by: NURSE PRACTITIONER

## 2025-05-14 PROCEDURE — 3017F COLORECTAL CA SCREEN DOC REV: CPT | Performed by: NURSE PRACTITIONER

## 2025-05-14 PROCEDURE — 3078F DIAST BP <80 MM HG: CPT | Performed by: NURSE PRACTITIONER

## 2025-05-14 PROCEDURE — 1123F ACP DISCUSS/DSCN MKR DOCD: CPT | Performed by: NURSE PRACTITIONER

## 2025-05-14 PROCEDURE — G8427 DOCREV CUR MEDS BY ELIG CLIN: HCPCS | Performed by: NURSE PRACTITIONER

## 2025-05-14 PROCEDURE — 93000 ELECTROCARDIOGRAM COMPLETE: CPT | Performed by: NURSE PRACTITIONER

## 2025-05-14 PROCEDURE — 99214 OFFICE O/P EST MOD 30 MIN: CPT | Performed by: NURSE PRACTITIONER

## 2025-05-14 RX ORDER — LOSARTAN POTASSIUM 50 MG/1
75 TABLET ORAL DAILY
Qty: 90 TABLET | Refills: 3
Start: 2025-05-14

## 2025-05-14 RX ORDER — DILTIAZEM HYDROCHLORIDE 240 MG/1
240 CAPSULE, COATED, EXTENDED RELEASE ORAL DAILY
Qty: 90 CAPSULE | Refills: 3 | Status: SHIPPED | OUTPATIENT
Start: 2025-05-14

## 2025-05-14 RX ORDER — OMEPRAZOLE 40 MG/1
40 CAPSULE, DELAYED RELEASE ORAL
Qty: 90 CAPSULE | Refills: 1 | Status: SHIPPED | OUTPATIENT
Start: 2025-05-14

## 2025-05-14 ASSESSMENT — ENCOUNTER SYMPTOMS
COLOR CHANGE: 0
PHOTOPHOBIA: 0
ABDOMINAL DISTENTION: 0
SINUS PAIN: 0
ABDOMINAL PAIN: 0
SINUS PRESSURE: 0
BLOOD IN STOOL: 0
SHORTNESS OF BREATH: 0
COUGH: 0
BACK PAIN: 0

## 2025-05-14 NOTE — PROGRESS NOTES
Electrophysiology Follow Up  Note      Reason for consultation:  Atrial flutter    Chief complaint :  6 month follow up on watchman    Referring physician: Dr. Drew      Primary care physician: Xena English APRN - CNP      History of Present Illness:     This visit 5/14/2025  Patient is here today for 6 month follow-up status post watchman.  Patient reports that he has been feeling well.  He states that he has had some elevated blood pressures and has had to increase his losartan.  He denies chest pain, palpitations, shortness of breath, lightheadedness, dizziness, edema or syncope.    Previous visit 11/7/2024  Patient here today for 1 week follow-up status post watchman.   He is feeling well.  He denies chest pain, palpitations, shortness of breath, lightheadedness, dizziness, edema or syncope.    Previous visit (10/2/2024)      Chief Complaint   Patient presents with    Follow-up     Patient denies chest pain, shortness of breath, palpitations, dizziness, syncope. Patient denies tobacco and caffeine. Patient states he drinks 1-2 mixed alcohol drinks. Patient states he recently joined a Gym and has been exercising regularly.    Patient is here to discuss watchman as he is concerned about being on anticoagulation  Recently had an episode of atrial fibrillation and he had palpitation. He is now back on anticoagulation - on xarelto.            Previous visit:     Patient is 71-year-old male with a history of hypertension, hyperlipidemia referred by Dr. Drew for atrial flutter.  Patient reports that recently he went to the emergency room with chest pain and palpitations and was noted to be in atrial flutter.  Patient was referred to me for further management.  Patient reports he does not feel any symptoms now but when he had the arrhythmia he was very symptomatic.  Patient denies drinking alcohol or smoking.  Patient reports drinking coffee every morning.  Patient

## 2025-05-15 ENCOUNTER — TELEPHONE (OUTPATIENT)
Age: 75
End: 2025-05-15

## 2025-05-15 NOTE — TELEPHONE ENCOUNTER
Spoke with Patient and informed him that he has paperwork from his office visit with Isatu Allison NP on 5/14/25, to  in our office at the . (In employee filing cabinet for Patients paperwork)    Patient stated he can pick it up on Friday, 5/16/25.     Patient advised understanding.

## 2025-05-22 ENCOUNTER — TELEPHONE (OUTPATIENT)
Dept: CARDIOLOGY CLINIC | Age: 75
End: 2025-05-22

## 2025-05-22 DIAGNOSIS — I10 ESSENTIAL HYPERTENSION: Chronic | ICD-10-CM

## 2025-05-22 RX ORDER — LOSARTAN POTASSIUM 50 MG/1
75 TABLET ORAL DAILY
Qty: 90 TABLET | Refills: 3 | Status: SHIPPED | OUTPATIENT
Start: 2025-05-22

## 2025-05-22 RX ORDER — DILTIAZEM HYDROCHLORIDE 240 MG/1
240 CAPSULE, COATED, EXTENDED RELEASE ORAL DAILY
Qty: 90 CAPSULE | Refills: 3 | Status: SHIPPED | OUTPATIENT
Start: 2025-05-22

## 2025-05-22 RX ORDER — OMEPRAZOLE 40 MG/1
40 CAPSULE, DELAYED RELEASE ORAL
Qty: 90 CAPSULE | Refills: 1 | Status: SHIPPED | OUTPATIENT
Start: 2025-05-22

## 2025-05-22 NOTE — TELEPHONE ENCOUNTER
Pt called in stating scripts that we sent Diltiazem, Omeprazole, and Losartan need to go to [facility] [phone] . Fax script along with supporting office notes, attention Dr. Goldman.   Also the [facility] only can get the Losartan in 50mg or 100mg. He is not sure if he needs to break in half or what Isatu would like for him to do.

## 2025-05-27 ENCOUNTER — TELEPHONE (OUTPATIENT)
Dept: CARDIOLOGY CLINIC | Age: 75
End: 2025-05-27

## 2025-05-27 NOTE — TELEPHONE ENCOUNTER
Spoke with Patient and advised him Per Isatu Allison NP, he can fill 50 MG of Losartan and cut in half for 25mg, seeing the patient is directed 75mg daily. Patient advised understanding.    Requested RX's and office notes faxed to [facility] today. Scanned into Patients chart.

## 2025-06-16 ENCOUNTER — TELEPHONE (OUTPATIENT)
Dept: SURGERY | Age: 75
End: 2025-06-16

## 2025-07-03 ENCOUNTER — OFFICE VISIT (OUTPATIENT)
Dept: SURGERY | Age: 75
End: 2025-07-03
Payer: MEDICARE

## 2025-07-03 VITALS
BODY MASS INDEX: 34 KG/M2 | SYSTOLIC BLOOD PRESSURE: 132 MMHG | HEART RATE: 82 BPM | WEIGHT: 237.5 LBS | DIASTOLIC BLOOD PRESSURE: 84 MMHG | OXYGEN SATURATION: 98 % | RESPIRATION RATE: 18 BRPM | HEIGHT: 70 IN

## 2025-07-03 DIAGNOSIS — D17.21 LIPOMA OF RIGHT UPPER EXTREMITY: Primary | ICD-10-CM

## 2025-07-03 PROCEDURE — 3079F DIAST BP 80-89 MM HG: CPT | Performed by: SURGERY

## 2025-07-03 PROCEDURE — G8417 CALC BMI ABV UP PARAM F/U: HCPCS | Performed by: SURGERY

## 2025-07-03 PROCEDURE — 3017F COLORECTAL CA SCREEN DOC REV: CPT | Performed by: SURGERY

## 2025-07-03 PROCEDURE — 99203 OFFICE O/P NEW LOW 30 MIN: CPT | Performed by: SURGERY

## 2025-07-03 PROCEDURE — 1036F TOBACCO NON-USER: CPT | Performed by: SURGERY

## 2025-07-03 PROCEDURE — G8427 DOCREV CUR MEDS BY ELIG CLIN: HCPCS | Performed by: SURGERY

## 2025-07-03 PROCEDURE — 1123F ACP DISCUSS/DSCN MKR DOCD: CPT | Performed by: SURGERY

## 2025-07-03 PROCEDURE — 1159F MED LIST DOCD IN RCRD: CPT | Performed by: SURGERY

## 2025-07-03 PROCEDURE — 3075F SYST BP GE 130 - 139MM HG: CPT | Performed by: SURGERY

## 2025-07-03 RX ORDER — LOSARTAN POTASSIUM 25 MG/1
25 TABLET ORAL 3 TIMES DAILY
COMMUNITY

## 2025-07-03 ASSESSMENT — ENCOUNTER SYMPTOMS
ANAL BLEEDING: 0
BACK PAIN: 0
RECTAL PAIN: 0
STRIDOR: 0
EYE REDNESS: 0
PHOTOPHOBIA: 0
EYE ITCHING: 0
CHOKING: 0
SORE THROAT: 0
APNEA: 0
CONSTIPATION: 0
COLOR CHANGE: 0

## 2025-07-03 NOTE — PROGRESS NOTES
Chief Complaint   Patient presents with    New Patient     - NP - fatty mass on right upper arm           SUBJECTIVE:    History of Present Illness  The patient presents for evaluation of a lipoma.    There is a history of a lipoma that was previously excised by Dr. Jennings. During the initial surgery, the patient was informed that complete removal was not possible due to the complexity of the procedure, and the lipoma has since regrown. Currently, there is no discomfort from the lipoma, but there is slight numbness in two fingers, which may be related to the lipoma. The patient was informed that a nerve was inadvertently damaged during the initial surgery.    A total knee replacement surgery on the right knee is scheduled in 3 to 5 weeks, to be performed by Dr. Belcher. Consultation regarding the timing of this surgery is necessary, as postoperative recovery will require significant arm strength for using a walker and cane. The recovery period for the knee surgery is expected to be lengthy, with severe pain anticipated in the first week. An appointment with Dr. Belcher is scheduled for 07/09/2025 to discuss the surgery further. The patient has been managing the knee condition for the past 10 years and recently removed the knee brace as walking has become slightly easier. Exercises using the arms for support have been performed.    PAST SURGICAL HISTORY:  Previous lipoma excision by Dr. Jennings    SOCIAL HISTORY  Occupations: Worked at the 's office, previously a   Exercise: Exercises with arms behind chairs    I have reviewed the patient's(pertinent information to this visit) medical history, family history(scanned in  the [employer] under \"patient questioner\"), social history and review of systems with the patient today in the office.            Past Surgical History:   Procedure Laterality Date    ABLATION OF DYSRHYTHMIC FOCUS  12/21/2021    atrial flutter ablation    APPENDECTOMY      COLONOSCOPY

## 2025-07-07 DIAGNOSIS — Z01.818 PRE-OPERATIVE CLEARANCE: Primary | ICD-10-CM

## 2025-07-10 ENCOUNTER — TELEPHONE (OUTPATIENT)
Dept: CARDIOLOGY CLINIC | Age: 75
End: 2025-07-10

## 2025-07-10 NOTE — TELEPHONE ENCOUNTER
Cardiologist: Dr. Drew  Surgeon: Dr. Belcher   Surgery: total right knee replacement   Anesthesia: unknown  Date: 3-5 weeks  FAX# [phone]    Ph# [phone]    Last OV 12/9/24 w/Isatu      Cardiologist: Dr. Drew  Surgeon: Dr. jones   Surgery: lipoma of right upper arm   Anesthesia: unknown  Date: pending  FAX# [phone]    Ph# epic    Last OV 12/9/24 w/Isatu

## 2025-07-28 ENCOUNTER — TELEPHONE (OUTPATIENT)
Dept: SURGERY | Age: 75
End: 2025-07-28

## 2025-07-28 NOTE — TELEPHONE ENCOUNTER
SPOKE TO Enrico Herzog REGARDING SURGERY -(RIGHT ARM LIPOMA EXCISION) SCHEDULED @ [facility]. NOTIFIED OF DATES, TIMES AND LOCATION    PHONE ASSESSMENT   SURGERY - 9/10 @ 8:30  P/O - 9/19 @ 9:30    NPO AFTER MIDNIGHT    HOLD BLOOD THINNERS - NA

## (undated) DEVICE — PERCUTANEOUS ENTRY THINWALL NEEDLE  ONE-PART: Brand: COOK

## (undated) DEVICE — ANGIOGRAPHY KIT CUST MANIFOLD

## (undated) DEVICE — DILATOR: Brand: COOK

## (undated) DEVICE — Device

## (undated) DEVICE — ACCESS SHEATH WITH DILATOR: Brand: WATCHMAN FXD CURVE™ ACCESS SYSTEM

## (undated) DEVICE — CHECK-FLO PERFORMER INTRODUCER: Brand: PERFORMER

## (undated) DEVICE — CATHETER ANGIO 6FR L110CM ID0.056IN VENT PIG CRV ROBUST

## (undated) DEVICE — WIRE GUID DIAG PTFE COAT FIX COR STD XIBLE J TIP 7MM

## (undated) DEVICE — INTRODUCER SHTH 6FR L12CM DIA0.038IN STD HEMSTAS CLOSE TOL

## (undated) DEVICE — PERCLOSE™ PROSTYLE™ SUTURE-MEDIATED CLOSURE AND REPAIR SYSTEM: Brand: PERCLOSE™ PROSTYLE™

## (undated) DEVICE — 1 X VERSACROSS LARGE ACCESS TRANSSEPTAL DILATOR (INCLUDING 1 X J-TIP MECHANICAL GUIDEWIRE); 1 X VERSACROSS RF WIRE (INCLUDING 1 X CONNECTOR CABLE (SINGLE USE)); 1 X DISPERSIVE ELECTRODE: Brand: VERSACROSS LARGE ACCESS SOLUTION